# Patient Record
Sex: MALE | Race: WHITE | Employment: OTHER | URBAN - METROPOLITAN AREA
[De-identification: names, ages, dates, MRNs, and addresses within clinical notes are randomized per-mention and may not be internally consistent; named-entity substitution may affect disease eponyms.]

---

## 2018-02-21 ENCOUNTER — HOSPITAL ENCOUNTER (OUTPATIENT)
Dept: ULTRASOUND IMAGING | Age: 68
Discharge: HOME | End: 2018-02-21

## 2018-02-21 DIAGNOSIS — Z87.891 PERSONAL HISTORY OF NICOTINE DEPENDENCE: ICD-10-CM

## 2018-07-19 ENCOUNTER — HOSPITAL ENCOUNTER (OUTPATIENT)
Dept: NON INVASIVE DIAGNOSTICS | Age: 68
Discharge: HOME | End: 2018-07-19

## 2018-07-19 DIAGNOSIS — M79.674 PAIN OF TOE OF RIGHT FOOT: ICD-10-CM

## 2019-04-27 ENCOUNTER — APPOINTMENT (OUTPATIENT)
Dept: GENERAL RADIOLOGY | Age: 69
End: 2019-04-27
Payer: MEDICARE

## 2019-04-27 ENCOUNTER — HOSPITAL ENCOUNTER (EMERGENCY)
Age: 69
Discharge: HOME | End: 2019-04-28
Payer: MEDICARE

## 2019-04-27 DIAGNOSIS — S52.222A CLOSED DISPLACED TRANSVERSE FRACTURE OF SHAFT OF LEFT ULNA, INITIAL ENCOUNTER: ICD-10-CM

## 2019-04-27 DIAGNOSIS — T07.XXXA ABRASION, MULTIPLE SITES: ICD-10-CM

## 2019-04-27 DIAGNOSIS — S81.012A LACERATION OF LEFT KNEE, INITIAL ENCOUNTER: ICD-10-CM
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
## 2019-04-27 PROCEDURE — 71101 X-RAY EXAM UNILAT RIBS/CHEST: CPT

## 2019-04-27 PROCEDURE — 6370000000 HC RX 637 (ALT 250 FOR IP): Performed by: EMERGENCY MEDICINE

## 2019-04-27 PROCEDURE — 4500000024 HC ED LEVEL 4 PROCEDURE

## 2019-04-27 PROCEDURE — 99284 EMERGENCY DEPT VISIT MOD MDM: CPT

## 2019-04-27 PROCEDURE — 73090 X-RAY EXAM OF FOREARM: CPT

## 2019-04-27 PROCEDURE — 73562 X-RAY EXAM OF KNEE 3: CPT

## 2019-04-27 RX ORDER — OXYCODONE HYDROCHLORIDE AND ACETAMINOPHEN 5; 325 MG/1; MG/1
1 TABLET ORAL ONCE
Status: COMPLETED | OUTPATIENT
Start: 2019-04-27 | End: 2019-04-27

## 2019-04-27 RX ORDER — HYDROCODONE BITARTRATE AND ACETAMINOPHEN 5; 325 MG/1; MG/1
1 TABLET ORAL EVERY 8 HOURS PRN
Qty: 9 TABLET | Refills: 0 | Status: SHIPPED | OUTPATIENT
Start: 2019-04-27 | End: 2019-04-30

## 2019-04-27 RX ORDER — NAPROXEN 500 MG/1
500 TABLET ORAL 2 TIMES DAILY
Qty: 20 TABLET | Refills: 0 | Status: SHIPPED | OUTPATIENT
Start: 2019-04-27 | End: 2019-05-06 | Stop reason: ALTCHOICE

## 2019-04-27 RX ADMIN — OXYCODONE HYDROCHLORIDE AND ACETAMINOPHEN 1 TABLET: 5; 325 TABLET ORAL at 21:59

## 2019-04-27 ASSESSMENT — PAIN SCALES - GENERAL
PAINLEVEL_OUTOF10: 9
PAINLEVEL_OUTOF10: 6
PAINLEVEL_OUTOF10: 9

## 2019-04-27 ASSESSMENT — ENCOUNTER SYMPTOMS
SHORTNESS OF BREATH: 0
VOMITING: 0
SORE THROAT: 0
ABDOMINAL PAIN: 0
NAUSEA: 0
BACK PAIN: 0

## 2019-04-27 ASSESSMENT — PAIN DESCRIPTION - ORIENTATION
ORIENTATION: LEFT
ORIENTATION: LEFT

## 2019-04-27 ASSESSMENT — PAIN DESCRIPTION - PROGRESSION: CLINICAL_PROGRESSION: GRADUALLY IMPROVING

## 2019-04-27 ASSESSMENT — PAIN DESCRIPTION - DESCRIPTORS
DESCRIPTORS: THROBBING
DESCRIPTORS: THROBBING

## 2019-04-27 ASSESSMENT — PAIN DESCRIPTION - PAIN TYPE
TYPE: ACUTE PAIN
TYPE: ACUTE PAIN

## 2019-04-27 ASSESSMENT — PAIN DESCRIPTION - LOCATION
LOCATION: ARM
LOCATION: ARM

## 2019-04-27 ASSESSMENT — PAIN DESCRIPTION - ONSET: ONSET: ON-GOING

## 2019-04-27 ASSESSMENT — PAIN - FUNCTIONAL ASSESSMENT: PAIN_FUNCTIONAL_ASSESSMENT: PREVENTS OR INTERFERES WITH MANY ACTIVE NOT PASSIVE ACTIVITIES

## 2019-04-27 ASSESSMENT — PAIN DESCRIPTION - FREQUENCY
FREQUENCY: CONTINUOUS
FREQUENCY: CONTINUOUS

## 2019-04-28 VITALS
DIASTOLIC BLOOD PRESSURE: 85 MMHG | HEART RATE: 87 BPM | BODY MASS INDEX: 31.93 KG/M2 | HEIGHT: 69 IN | OXYGEN SATURATION: 98 % | SYSTOLIC BLOOD PRESSURE: 137 MMHG | TEMPERATURE: 98.1 F | RESPIRATION RATE: 18 BRPM | WEIGHT: 215.61 LBS

## 2019-04-28 ASSESSMENT — PAIN DESCRIPTION - ORIENTATION: ORIENTATION: LEFT

## 2019-04-28 ASSESSMENT — PAIN - FUNCTIONAL ASSESSMENT
PAIN_FUNCTIONAL_ASSESSMENT: PREVENTS OR INTERFERES WITH MANY ACTIVE NOT PASSIVE ACTIVITIES
PAIN_FUNCTIONAL_ASSESSMENT: 0-10

## 2019-04-28 ASSESSMENT — PAIN SCALES - GENERAL: PAINLEVEL_OUTOF10: 6

## 2019-04-28 ASSESSMENT — PAIN DESCRIPTION - PROGRESSION: CLINICAL_PROGRESSION: GRADUALLY IMPROVING

## 2019-04-28 ASSESSMENT — PAIN DESCRIPTION - PAIN TYPE: TYPE: ACUTE PAIN

## 2019-04-28 ASSESSMENT — PAIN DESCRIPTION - LOCATION: LOCATION: ARM

## 2019-04-28 ASSESSMENT — PAIN DESCRIPTION - DESCRIPTORS: DESCRIPTORS: THROBBING

## 2019-04-28 ASSESSMENT — PAIN DESCRIPTION - FREQUENCY: FREQUENCY: CONTINUOUS

## 2019-04-28 ASSESSMENT — PAIN DESCRIPTION - ONSET: ONSET: ON-GOING
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
## 2019-05-02 ENCOUNTER — OFFICE VISIT (OUTPATIENT)
Dept: ORTHOPEDIC SURGERY | Age: 69
End: 2019-05-02
Payer: MEDICARE

## 2019-05-02 ENCOUNTER — TELEPHONE (OUTPATIENT)
Dept: ORTHOPEDIC SURGERY | Age: 69
End: 2019-05-02

## 2019-05-02 VITALS
DIASTOLIC BLOOD PRESSURE: 80 MMHG | BODY MASS INDEX: 31.84 KG/M2 | HEART RATE: 71 BPM | WEIGHT: 215 LBS | HEIGHT: 69 IN | SYSTOLIC BLOOD PRESSURE: 117 MMHG

## 2019-05-02 DIAGNOSIS — S52.222A CLOSED DISPLACED TRANSVERSE FRACTURE OF SHAFT OF LEFT ULNA, INITIAL ENCOUNTER: Primary | ICD-10-CM

## 2019-05-02 PROCEDURE — G8427 DOCREV CUR MEDS BY ELIG CLIN: HCPCS | Performed by: ORTHOPAEDIC SURGERY

## 2019-05-02 PROCEDURE — 1036F TOBACCO NON-USER: CPT | Performed by: ORTHOPAEDIC SURGERY

## 2019-05-02 PROCEDURE — G8417 CALC BMI ABV UP PARAM F/U: HCPCS | Performed by: ORTHOPAEDIC SURGERY

## 2019-05-02 PROCEDURE — 3017F COLORECTAL CA SCREEN DOC REV: CPT | Performed by: ORTHOPAEDIC SURGERY

## 2019-05-02 PROCEDURE — 1123F ACP DISCUSS/DSCN MKR DOCD: CPT | Performed by: ORTHOPAEDIC SURGERY

## 2019-05-02 PROCEDURE — 4040F PNEUMOC VAC/ADMIN/RCVD: CPT | Performed by: ORTHOPAEDIC SURGERY

## 2019-05-02 PROCEDURE — 99203 OFFICE O/P NEW LOW 30 MIN: CPT | Performed by: ORTHOPAEDIC SURGERY

## 2019-05-02 NOTE — LETTER
CONSENT TO OPERATION  AND/OR OTHER PROCEDURE(S)          PATIENT : Cassy Mojica   YOB: 1950    DATE : 5/2/19          1. I request and consent that Dr. Lovely Maloney. Emilio Ann,  and/or his associates or assistants perform an operation and/or procedures on the above patient at  [place] [address], to treat the condition(s) which appear indicated by the diagnostic studies already performed. I have been told that in general terms the nature, purpose and reasonable expectations of the operation and/or procedure(s) are:     ORIF Fracture left ulna      2. It has been explained to me by the informing physician that during the course of the operation and/or procedure(s) unforeseen conditions may be revealed that necessitate an extension of the original operation and/or procedure(s) or different operation and/or procedures than those set forth in Paragraph 1. I therefore authorize and request that my physician and/or his associates or assistants perform such operations and/or procedures as are necessary and desirable in the exercise of professional judgment. The authority granted under this Paragraph 2 shall extend to all conditions that require treatment and are known to my physician at the time the operation is commenced. 3. I have been made aware by the informing physician of certain risks and consequences that are associated with the operation and/or procedure(s) described in Paragraph 1, the reasonable alternative methods or treatment, the possible consequences, the possibility that the operation and/or procedure(s) may be unsuccessful and the possibility of complications. I understand the reasonably known risks to be:      ? Bleeding  ? Infection  ? Poor Healing  ? Possible Damage to Nerve, Vessel, Tendon/Muscle or Bone  ? Need for further Treatment/Surgery  ? Stiffness  ? Pain  ? Residual or Recurrent Symptoms  ? Anesthetic and/or Medical Risks  ? 4. I have also been informed by the informing physician that there are other risks from both known and unknown causes that are attendant to the performance of any surgical procedure. I am aware that the practice of medicine and surgery is not an exact science, and that no guarantees have been made to me concerning the results of the operation and/or procedure(s). 5. I   CONSENT / REFUSE CONSENT  (strike the phrase that does not apply) to the taking of photographs before, during and/or after the operation or procedure for scientific/educational purposes. 6. I consent to the administration of anesthesia and to the use of such anesthetics as may be deemed advisable by the anesthesiologist who has been engaged by me or my physician. 7. I certify that I have read and understand the above consent to operation and/or other procedure(s); that the explanations therein referred to were made to me by the informing physician in advance of my signing this consent; that all blanks or statements requiring insertion or completion were filled in and inapplicable paragraphs, if any, were stricken before I signed; and that all questions asked by me about the operation and/or procedure(s) which I have consented to have been fully answered in a satisfactory manner.                               _______________________           5/2/19                            Witness     Signature Of Patient         Date        Lazaro Lindsey                                                 Informing Physician                                           Signature of Informing Physician                              If patient is unable to sign or is a minor, complete one of the following:    (A)  Patient is a minor  years of age. (B)  Patient is unable to sign because: The undersigned represents that he or she is duly authorized to execute this consent for and on behalf of the above named patient.               Witness o  Parent  o  Guardian   o  Spouse       o  Other (specify)                                   Patient Name: Marta Donnelly  Patient YOB: 1950  Dr. Ha Burgess' Return To Work Policy  Regarding your ability to return to work after surgery or injury, Dr. Ha Burgess will not state that any patient is off of work or cannot work at all. He will place you on restrictions after your surgical procedure or injury. This means that you will be allowed to return to work the day after your office visit or surgery with restrictions. Depending on the details of your particular situation, Dr. Ha Burgess may state that you will have either light use or no use of your hand for a specific number of weeks. It is your obligation to communicate with your employer regarding your restrictions. It is your employer's decision as to whether they will accommodate your restrictions (i.e. allow you to come to work in your restricted capacity) or to not allow you to return to work under your restrictions. Dr. Ha Burgess does not participate in making this decision and cannot influence your employer regarding their decision. If you do not communicate your restrictions to your employer, or if you do not present to work as you are scheduled to, Dr. Ha Burgess will not provide an 'excuse' to explain your absence. A doctors note, or official forms (C, FMLA, etc.) will be filled out, upon request, to indicate your date of surgery and your restrictions as stated above. Dr. Ha Burgess' Narcotic Policy  Patients will only be prescribed narcotics after surgical procedures or significant injury. Not all procedures cause pain great enough to require Narcotics and thus, not all patients will receive prescriptions after surgical procedures or injuries. Narcotics are never prescribed for chronic conditions. Narcotics are never prescribed for use longer than one week at a time.  Refills are only granted in unusual circumstances and only at Dr. Sravan Paiz discretion. Patients who are receiving narcotic medication from another physician or who are under pain management contracts will not be given a prescription for narcotics for any reason. I have read the above policies and understand that by agreeing to proceed with treatment by Dr. Charline Casey and his team, that I am agreeing to abide by these policies.     Patient Name:  Jamshid Armijo    Patient Signature:  _____________________________    Андрейista Outhouse Date:   5/2/19
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
## 2019-05-02 NOTE — LETTER
[facility]  Surgery Scheduling Form:  DEMOGRAPHICS:                                                                                                               Patient Name:  Roney Fung  Patient :  1950   Patient SS#:  [id number]    Patient Phone:  [phone] (home)      Patient Address:  [address] [place] [address]    PCP:  Richie Lowery MD  Insurance:  . Payor/Plan Subscr  Sex Relation Sub. Ins. ID Effective Group Num   1. [place] 1950 Male  [id number]                                   [address] [address]   2. [employer] [place] Tabitha 1950 Male Self  19                                    [address] [facility], [address], [facility] [address]     DIAGNOSIS & PROCEDURE:                                                                                             Diagnosis:  Left ulna shaft fracture    S52.222A  Operation:  Open Reduction & Internal Fixation  left ulna shaft Fracture     [id number]  Location:  [facility]  Surgeon:  Lavinia Serrano    SCHEDULING INFORMATION:                                                                                         .  Surgeon's Scheduling Instruction:  within 7 days  Requested Date:    OR Time:  11:45 Patient Arrival Time:  10:15OR Time Required:  45  Minutes  Anesthesia:  General  Equipment:  Lauryn/Biomet small frag set, TPS  Mini C-Arm:  Yes   Standard C-Arm:  No  Status:  outpatient  PAT Required:  Yes  Comments:                      Maxwell Ramos.  Gricelda Krueger MD  19   BILLING INFORMATION:                                                                                                     Procedure:       CPT Code Modifier

## 2019-05-06 ENCOUNTER — ANESTHESIA EVENT (OUTPATIENT)
Dept: OPERATING ROOM | Age: 69
End: 2019-05-06
Payer: MEDICARE

## 2019-05-06 RX ORDER — BUPROPION HYDROCHLORIDE 150 MG/1
150 TABLET ORAL EVERY MORNING
COMMUNITY
Start: 2019-03-11

## 2019-05-06 RX ORDER — CYCLOBENZAPRINE HCL 10 MG
10 TABLET ORAL 3 TIMES DAILY PRN
Status: ON HOLD | COMMUNITY
Start: 2010-12-21 | End: 2021-02-09

## 2019-05-06 RX ORDER — OMEGA-3 FATTY ACIDS CAP DELAYED RELEASE 1000 MG 1000 MG
1 CAPSULE DELAYED RELEASE ORAL DAILY
COMMUNITY
End: 2021-02-03

## 2019-05-06 RX ORDER — RANITIDINE 150 MG/1
TABLET ORAL
COMMUNITY

## 2019-05-06 RX ORDER — CHOLECALCIFEROL (VITAMIN D3) 125 MCG
CAPSULE ORAL
COMMUNITY

## 2019-05-06 RX ORDER — ROSUVASTATIN CALCIUM 10 MG/1
10 TABLET, COATED ORAL DAILY
COMMUNITY
Start: 2019-03-11 | End: 2019-05-21

## 2019-05-06 RX ORDER — LISINOPRIL 10 MG/1
TABLET ORAL DAILY
Status: ON HOLD | COMMUNITY
Start: 2019-03-11 | End: 2021-02-09

## 2019-05-06 RX ORDER — ACETAMINOPHEN 325 MG/1
650 TABLET ORAL DAILY
COMMUNITY

## 2019-05-06 RX ORDER — MULTIVIT-MIN/IRON/FOLIC ACID/K 18-600-40
500 CAPSULE ORAL DAILY
COMMUNITY

## 2019-05-06 NOTE — PROGRESS NOTES
C-Difficile admission screening and protocol:     * Admitted with diarrhea? NO   *Prior history of C-Diff. In last 3 months? NO   *Antibiotic use in the past 6-8 weeks? NO     *Prior hospitalization or nursing home in the last month?    NO

## 2019-05-06 NOTE — PROGRESS NOTES
[address] [facility] time____1015________        Surgery time___1145_________    Take the following medications with a sip of water: LISINOPRIL,BUPROPION    Do not eat or drink anything after 12:00 midnight prior to your surgery. This includes water chewing gum, mints and ice chips. You may brush your teeth and gargle the morning of your surgery, but do not swallow the water     Please see your family doctor/pediatrician for a history and physical and/or concerning medications. Bring any test results/reports from your physicians office. If you are under the care of a heart doctor or specialist doctor, please be aware that you may be asked to them for clearance    You may be asked to stop blood thinners such as Coumadin, Plavix, Fragmin, Lovenox, etc., or any anti-inflammatories such as:  Aspirin, Ibuprofen, Advil, Naproxen prior to your surgery. We also ask that you stop any OTC medications such as fish oil, vitamin E, glucosamine, garlic, Multivitamins, COQ 10, etc.MAY TAKE TYLENOL    We ask that you do not smoke 24 hours prior to surgery  We ask that you do not  drink any alcoholic beverages 24 hours prior to surgery     You must make arrangements for a responsible adult to take you home after your surgery. For your safety you will not be allowed to leave alone or drive yourself home. Your surgery will be cancelled if you do not have a ride home. Also for your safety, it is strongly suggested that someone stay with you the first 24 hours after your surgery. A parent or legal guardian must accompany a child scheduled for surgery and plan to stay at the hospital until the child is discharged. Please do not bring other children with you. For your comfort, please wear simple loose fitting clothing to the hospital.  Please do not bring valuables.     Do not wear any make-up or nail polish on your fingers or toes      For your safety, please do not wear

## 2019-05-07 ENCOUNTER — HOSPITAL ENCOUNTER (OUTPATIENT)
Age: 69
Setting detail: OUTPATIENT SURGERY
Discharge: HOME | End: 2019-05-07
Attending: ORTHOPAEDIC SURGERY | Admitting: ORTHOPAEDIC SURGERY
Payer: MEDICARE

## 2019-05-07 ENCOUNTER — APPOINTMENT (OUTPATIENT)
Dept: GENERAL RADIOLOGY | Age: 69
End: 2019-05-07
Attending: ORTHOPAEDIC SURGERY
Payer: MEDICARE

## 2019-05-07 ENCOUNTER — ANESTHESIA (OUTPATIENT)
Dept: OPERATING ROOM | Age: 69
End: 2019-05-07
Payer: MEDICARE

## 2019-05-07 VITALS
DIASTOLIC BLOOD PRESSURE: 90 MMHG | HEIGHT: 69 IN | WEIGHT: 210 LBS | BODY MASS INDEX: 31.1 KG/M2 | HEART RATE: 74 BPM | OXYGEN SATURATION: 94 % | TEMPERATURE: 97 F | SYSTOLIC BLOOD PRESSURE: 148 MMHG | RESPIRATION RATE: 18 BRPM

## 2019-05-07 VITALS
RESPIRATION RATE: 3 BRPM | OXYGEN SATURATION: 98 % | SYSTOLIC BLOOD PRESSURE: 119 MMHG | DIASTOLIC BLOOD PRESSURE: 61 MMHG

## 2019-05-07 DIAGNOSIS — S52.222A CLOSED DISPLACED TRANSVERSE FRACTURE OF SHAFT OF LEFT ULNA, INITIAL ENCOUNTER: Primary | ICD-10-CM

## 2019-05-07 LAB
EKG ATRIAL RATE: 74 BPM
EKG DIAGNOSIS: NORMAL
EKG P AXIS: 58 DEGREES
EKG P-R INTERVAL: 164 MS
EKG Q-T INTERVAL: 378 MS
EKG QRS DURATION: 104 MS
EKG QTC CALCULATION (BAZETT): 419 MS
EKG R AXIS: 84 DEGREES
EKG T AXIS: 31 DEGREES
EKG VENTRICULAR RATE: 74 BPM

## 2019-05-07 PROCEDURE — C1713 ANCHOR/SCREW BN/BN,TIS/BN: HCPCS | Performed by: ORTHOPAEDIC SURGERY

## 2019-05-07 PROCEDURE — 2720000010 HC SURG SUPPLY STERILE: Performed by: ORTHOPAEDIC SURGERY

## 2019-05-07 PROCEDURE — 3700000000 HC ANESTHESIA ATTENDED CARE: Performed by: ORTHOPAEDIC SURGERY

## 2019-05-07 PROCEDURE — 3209999900 FLUORO FOR SURGICAL PROCEDURES

## 2019-05-07 PROCEDURE — 2500000003 HC RX 250 WO HCPCS: Performed by: NURSE ANESTHETIST, CERTIFIED REGISTERED

## 2019-05-07 PROCEDURE — 7100000000 HC PACU RECOVERY - FIRST 15 MIN: Performed by: ORTHOPAEDIC SURGERY

## 2019-05-07 PROCEDURE — 3700000001 HC ADD 15 MINUTES (ANESTHESIA): Performed by: ORTHOPAEDIC SURGERY

## 2019-05-07 PROCEDURE — 2709999900 HC NON-CHARGEABLE SUPPLY: Performed by: ORTHOPAEDIC SURGERY

## 2019-05-07 PROCEDURE — 7100000011 HC PHASE II RECOVERY - ADDTL 15 MIN: Performed by: ORTHOPAEDIC SURGERY

## 2019-05-07 PROCEDURE — 7100000010 HC PHASE II RECOVERY - FIRST 15 MIN: Performed by: ORTHOPAEDIC SURGERY

## 2019-05-07 PROCEDURE — 93005 ELECTROCARDIOGRAM TRACING: CPT | Performed by: ANESTHESIOLOGY

## 2019-05-07 PROCEDURE — 3600000005 HC SURGERY LEVEL 5 BASE: Performed by: ORTHOPAEDIC SURGERY

## 2019-05-07 PROCEDURE — 2500000003 HC RX 250 WO HCPCS: Performed by: ORTHOPAEDIC SURGERY

## 2019-05-07 PROCEDURE — 2580000003 HC RX 258: Performed by: ANESTHESIOLOGY

## 2019-05-07 PROCEDURE — 6360000002 HC RX W HCPCS: Performed by: NURSE ANESTHETIST, CERTIFIED REGISTERED

## 2019-05-07 PROCEDURE — 3600000015 HC SURGERY LEVEL 5 ADDTL 15MIN: Performed by: ORTHOPAEDIC SURGERY

## 2019-05-07 PROCEDURE — 6360000002 HC RX W HCPCS: Performed by: ANESTHESIOLOGY

## 2019-05-07 PROCEDURE — 7100000001 HC PACU RECOVERY - ADDTL 15 MIN: Performed by: ORTHOPAEDIC SURGERY

## 2019-05-07 PROCEDURE — 93010 ELECTROCARDIOGRAM REPORT: CPT | Performed by: INTERNAL MEDICINE

## 2019-05-07 DEVICE — SCREW BNE L18MM DIA3.5MM PERIARTC PROX HUM LOK FULL THRD: Type: IMPLANTABLE DEVICE | Site: ARM | Status: FUNCTIONAL

## 2019-05-07 DEVICE — SCREW BNE L20MM DIA3.5MM SM HEX HD DIA2.5MM CORT S STL ST: Type: IMPLANTABLE DEVICE | Site: ARM | Status: FUNCTIONAL

## 2019-05-07 DEVICE — IMPLANTABLE DEVICE: Type: IMPLANTABLE DEVICE | Site: ARM | Status: FUNCTIONAL

## 2019-05-07 DEVICE — SCREW BNE L16MM DIA3.5MM STD CORT S STL ST NONCANNULATED: Type: IMPLANTABLE DEVICE | Site: ARM | Status: FUNCTIONAL

## 2019-05-07 DEVICE — SCREW BNE L18MM DIA3.5MM LNG CORT S STL ST NONCANNULATED: Type: IMPLANTABLE DEVICE | Site: ARM | Status: FUNCTIONAL

## 2019-05-07 RX ORDER — MORPHINE SULFATE 2 MG/ML
2 INJECTION, SOLUTION INTRAMUSCULAR; INTRAVENOUS EVERY 5 MIN PRN
Status: DISCONTINUED | OUTPATIENT
Start: 2019-05-07 | End: 2019-05-07 | Stop reason: HOSPADM

## 2019-05-07 RX ORDER — OXYCODONE HYDROCHLORIDE 5 MG/1
10 TABLET ORAL PRN
Status: DISCONTINUED | OUTPATIENT
Start: 2019-05-07 | End: 2019-05-07 | Stop reason: HOSPADM

## 2019-05-07 RX ORDER — PROPOFOL 10 MG/ML
INJECTION, EMULSION INTRAVENOUS PRN
Status: DISCONTINUED | OUTPATIENT
Start: 2019-05-07 | End: 2019-05-07 | Stop reason: SDUPTHER

## 2019-05-07 RX ORDER — FENTANYL CITRATE 50 UG/ML
25 INJECTION, SOLUTION INTRAMUSCULAR; INTRAVENOUS EVERY 5 MIN PRN
Status: DISCONTINUED | OUTPATIENT
Start: 2019-05-07 | End: 2019-05-07 | Stop reason: HOSPADM

## 2019-05-07 RX ORDER — GLYCOPYRROLATE 0.2 MG/ML
INJECTION INTRAMUSCULAR; INTRAVENOUS PRN
Status: DISCONTINUED | OUTPATIENT
Start: 2019-05-07 | End: 2019-05-07 | Stop reason: SDUPTHER

## 2019-05-07 RX ORDER — LIDOCAINE HYDROCHLORIDE 20 MG/ML
INJECTION, SOLUTION EPIDURAL; INFILTRATION; INTRACAUDAL; PERINEURAL PRN
Status: DISCONTINUED | OUTPATIENT
Start: 2019-05-07 | End: 2019-05-07 | Stop reason: SDUPTHER

## 2019-05-07 RX ORDER — OXYCODONE HYDROCHLORIDE 5 MG/1
5 TABLET ORAL PRN
Status: DISCONTINUED | OUTPATIENT
Start: 2019-05-07 | End: 2019-05-07 | Stop reason: HOSPADM

## 2019-05-07 RX ORDER — MEPERIDINE HYDROCHLORIDE 25 MG/ML
12.5 INJECTION INTRAMUSCULAR; INTRAVENOUS; SUBCUTANEOUS EVERY 5 MIN PRN
Status: DISCONTINUED | OUTPATIENT
Start: 2019-05-07 | End: 2019-05-07 | Stop reason: HOSPADM

## 2019-05-07 RX ORDER — FENTANYL CITRATE 50 UG/ML
INJECTION, SOLUTION INTRAMUSCULAR; INTRAVENOUS PRN
Status: DISCONTINUED | OUTPATIENT
Start: 2019-05-07 | End: 2019-05-07 | Stop reason: SDUPTHER

## 2019-05-07 RX ORDER — SODIUM CHLORIDE 9 MG/ML
INJECTION, SOLUTION INTRAVENOUS CONTINUOUS
Status: DISCONTINUED | OUTPATIENT
Start: 2019-05-07 | End: 2019-05-07 | Stop reason: HOSPADM

## 2019-05-07 RX ORDER — HYDROCODONE BITARTRATE AND ACETAMINOPHEN 5; 325 MG/1; MG/1
1 TABLET ORAL EVERY 6 HOURS PRN
Qty: 20 TABLET | Refills: 0 | Status: SHIPPED | OUTPATIENT
Start: 2019-05-07 | End: 2019-05-14

## 2019-05-07 RX ORDER — ONDANSETRON 2 MG/ML
4 INJECTION INTRAMUSCULAR; INTRAVENOUS
Status: DISCONTINUED | OUTPATIENT
Start: 2019-05-07 | End: 2019-05-07 | Stop reason: HOSPADM

## 2019-05-07 RX ORDER — BUPIVACAINE HYDROCHLORIDE 5 MG/ML
INJECTION, SOLUTION EPIDURAL; INTRACAUDAL
Status: COMPLETED | OUTPATIENT
Start: 2019-05-07 | End: 2019-05-07

## 2019-05-07 RX ORDER — SODIUM CHLORIDE 0.9 % (FLUSH) 0.9 %
10 SYRINGE (ML) INJECTION PRN
Status: DISCONTINUED | OUTPATIENT
Start: 2019-05-07 | End: 2019-05-07 | Stop reason: HOSPADM

## 2019-05-07 RX ORDER — DEXAMETHASONE SODIUM PHOSPHATE 4 MG/ML
INJECTION, SOLUTION INTRA-ARTICULAR; INTRALESIONAL; INTRAMUSCULAR; INTRAVENOUS; SOFT TISSUE PRN
Status: DISCONTINUED | OUTPATIENT
Start: 2019-05-07 | End: 2019-05-07 | Stop reason: SDUPTHER

## 2019-05-07 RX ORDER — MORPHINE SULFATE 2 MG/ML
1 INJECTION, SOLUTION INTRAMUSCULAR; INTRAVENOUS EVERY 5 MIN PRN
Status: DISCONTINUED | OUTPATIENT
Start: 2019-05-07 | End: 2019-05-07 | Stop reason: HOSPADM

## 2019-05-07 RX ORDER — SODIUM CHLORIDE 0.9 % (FLUSH) 0.9 %
10 SYRINGE (ML) INJECTION EVERY 12 HOURS SCHEDULED
Status: DISCONTINUED | OUTPATIENT
Start: 2019-05-07 | End: 2019-05-07 | Stop reason: HOSPADM

## 2019-05-07 RX ORDER — FENTANYL CITRATE 50 UG/ML
50 INJECTION, SOLUTION INTRAMUSCULAR; INTRAVENOUS EVERY 5 MIN PRN
Status: DISCONTINUED | OUTPATIENT
Start: 2019-05-07 | End: 2019-05-07 | Stop reason: HOSPADM

## 2019-05-07 RX ADMIN — LIDOCAINE HYDROCHLORIDE 5 ML: 20 INJECTION, SOLUTION EPIDURAL; INFILTRATION; INTRACAUDAL; PERINEURAL at 11:53

## 2019-05-07 RX ADMIN — DEXAMETHASONE SODIUM PHOSPHATE 6 MG: 4 INJECTION, SOLUTION INTRAMUSCULAR; INTRAVENOUS at 11:59

## 2019-05-07 RX ADMIN — GLYCOPYRROLATE 0.1 MG: 0.2 INJECTION, SOLUTION INTRAMUSCULAR; INTRAVENOUS at 11:52

## 2019-05-07 RX ADMIN — HYDROMORPHONE HYDROCHLORIDE 1 MG: 1 INJECTION, SOLUTION INTRAMUSCULAR; INTRAVENOUS; SUBCUTANEOUS at 12:36

## 2019-05-07 RX ADMIN — FENTANYL CITRATE 25 MCG: 50 INJECTION, SOLUTION INTRAMUSCULAR; INTRAVENOUS at 12:55

## 2019-05-07 RX ADMIN — FENTANYL CITRATE 50 MCG: 50 INJECTION INTRAMUSCULAR; INTRAVENOUS at 11:53

## 2019-05-07 RX ADMIN — FENTANYL CITRATE 50 MCG: 50 INJECTION INTRAMUSCULAR; INTRAVENOUS at 11:52

## 2019-05-07 RX ADMIN — FENTANYL CITRATE 25 MCG: 50 INJECTION, SOLUTION INTRAMUSCULAR; INTRAVENOUS at 13:01

## 2019-05-07 RX ADMIN — SODIUM CHLORIDE: 9 INJECTION, SOLUTION INTRAVENOUS at 12:19

## 2019-05-07 RX ADMIN — SODIUM CHLORIDE: 9 INJECTION, SOLUTION INTRAVENOUS at 10:47

## 2019-05-07 RX ADMIN — PROPOFOL 200 MG: 10 INJECTION, EMULSION INTRAVENOUS at 11:53

## 2019-05-07 ASSESSMENT — PAIN DESCRIPTION - PAIN TYPE
TYPE: SURGICAL PAIN

## 2019-05-07 ASSESSMENT — PULMONARY FUNCTION TESTS
PIF_VALUE: 14
PIF_VALUE: 13
PIF_VALUE: 11
PIF_VALUE: 13
PIF_VALUE: 13
PIF_VALUE: 14
PIF_VALUE: 13
PIF_VALUE: 14
PIF_VALUE: 2
PIF_VALUE: 0
PIF_VALUE: 11
PIF_VALUE: 14
PIF_VALUE: 12
PIF_VALUE: 15
PIF_VALUE: 13
PIF_VALUE: 13
PIF_VALUE: 14
PIF_VALUE: 13
PIF_VALUE: 14
PIF_VALUE: 14
PIF_VALUE: 13
PIF_VALUE: 14
PIF_VALUE: 13
PIF_VALUE: 14
PIF_VALUE: 0
PIF_VALUE: 1
PIF_VALUE: 13
PIF_VALUE: 12
PIF_VALUE: 14
PIF_VALUE: 13
PIF_VALUE: 14
PIF_VALUE: 13
PIF_VALUE: 13
PIF_VALUE: 14
PIF_VALUE: 13
PIF_VALUE: 14
PIF_VALUE: 1
PIF_VALUE: 14
PIF_VALUE: 13
PIF_VALUE: 14
PIF_VALUE: 12
PIF_VALUE: 12

## 2019-05-07 ASSESSMENT — PAIN DESCRIPTION - PROGRESSION
CLINICAL_PROGRESSION: NOT CHANGED
CLINICAL_PROGRESSION: GRADUALLY IMPROVING
CLINICAL_PROGRESSION: NOT CHANGED

## 2019-05-07 ASSESSMENT — PAIN DESCRIPTION - LOCATION
LOCATION: ARM
LOCATION: WRIST
LOCATION: WRIST

## 2019-05-07 ASSESSMENT — PAIN DESCRIPTION - DESCRIPTORS
DESCRIPTORS: ACHING;DISCOMFORT;SORE
DESCRIPTORS: ACHING;DULL
DESCRIPTORS: DULL;ACHING
DESCRIPTORS: DULL;ACHING
DESCRIPTORS: ACHING;DULL
DESCRIPTORS: ACHING;DULL

## 2019-05-07 ASSESSMENT — PAIN DESCRIPTION - FREQUENCY
FREQUENCY: CONTINUOUS

## 2019-05-07 ASSESSMENT — PAIN DESCRIPTION - ORIENTATION
ORIENTATION: LEFT

## 2019-05-07 ASSESSMENT — PAIN SCALES - GENERAL
PAINLEVEL_OUTOF10: 4
PAINLEVEL_OUTOF10: 3
PAINLEVEL_OUTOF10: 0
PAINLEVEL_OUTOF10: 3
PAINLEVEL_OUTOF10: 3
PAINLEVEL_OUTOF10: 4
PAINLEVEL_OUTOF10: 3

## 2019-05-07 ASSESSMENT — PAIN DESCRIPTION - ONSET
ONSET: ON-GOING

## 2019-05-07 ASSESSMENT — PAIN - FUNCTIONAL ASSESSMENT
PAIN_FUNCTIONAL_ASSESSMENT: PREVENTS OR INTERFERES WITH ALL ACTIVE AND SOME PASSIVE ACTIVITIES
PAIN_FUNCTIONAL_ASSESSMENT: 0-10
PAIN_FUNCTIONAL_ASSESSMENT: PREVENTS OR INTERFERES SOME ACTIVE ACTIVITIES AND ADLS

## 2019-05-07 NOTE — PROGRESS NOTES
To PACU by cart from OR. On monitors. VSS. Oral airway in place. resps easy and regular. Left arm elevated on a pillow.

## 2019-05-07 NOTE — ANESTHESIA POSTPROCEDURE EVALUATION
Department of Anesthesiology  Postprocedure Note    Patient: Zach Mendoza  MRN: [id number]  YOB: 1950  Date of evaluation: 5/7/2019  Time:  2:06 PM     Procedure Summary     Date:  05/07/19 Room / Location:  [facility] OR 03 / [facility] OR    Anesthesia Start:  1152 Anesthesia Stop:  1244    Procedure:  OPEN REDUCTION AND INTERNAL FIXATION LEFT ULNA SHAFT FRACTURE WITH MINI C-ARM (Left ) Diagnosis:  (LEFT ULNA SHAFT FRACTURE)    Surgeon:  Raissa Barboza MD Responsible Provider:  Remy Batista MD    Anesthesia Type:  general ASA Status:  2          Anesthesia Type: general    Jannette Phase I: Jannette Score: 10    Jannette Phase II: Jannette Score: 10    Last vitals: Reviewed and per EMR flowsheets.        Anesthesia Post Evaluation    Patient location during evaluation: PACU  Patient participation: complete - patient participated  Level of consciousness: awake and alert  Pain score: 0  Airway patency: patent  Nausea & Vomiting: no nausea and no vomiting  Complications: no  Cardiovascular status: blood pressure returned to baseline  Respiratory status: acceptable  Hydration status: euvolemic

## 2019-05-07 NOTE — PROGRESS NOTES
States pain level tolerable. Stable for transfer to ACU by cart. Able to open and close fingers to left hand.

## 2019-05-07 NOTE — PROGRESS NOTES
Report received from [facility]. Daughter present. Pt wants to fill prescription at retail pharmacy. Daughter took prescription to retail pharmacy.

## 2019-05-07 NOTE — OP NOTE
displace fracture of the ulna. The fracture was mobilized and manually reduced. A 6 - hole locking plate was selected and applied to the appropriate surface of the distal ulna. The distal 2 holes were drilled, measured and tapped to accommodate appropriate screws. The plate was then loaded under compression, assuring the ulna was of anatomic alignment and anatomic rotation. It was secured proximally with 2 bicortical screws under compression mode. The fracture site was seen to compress beautifully and maintain excellent anatomic alignment during the remaining fixation. The remaining 2 holes were filled with non-locking screws. Final fluoroscopic imaging was obtained, demonstrating excellent & anatomic alignment of the ulna at the fracture site. The wound was copiously irrigated with sterile saline for irrigation. The forearm fascia was reapproximated in a watertight fashion over the hardware, obtaining excellent soft tissue coverage. The pneumatic tourniquet was deflated after 24 minutes of elevation. Hemostasis was obtained with direct pressure and electrocautery. The fingers were immediately pink and well perfused. The skin and subcutaneous tissues were closed in layers with interrupted absorbable sutures, the wound dressed with Adaptic, dry sterile dressing, and a bulky volar wrist splint was applied. Mr. Rosita Valenzuela was awakened from anesthesia, having tolerated the procedure without apparent complication. He was returned to the recovery room in stable condition. At the conclusion of the procedure, needle, instrument, and sponge counts   were correct. Jillian Muller MD   5/7/2019 , 11:49 AM

## 2019-05-07 NOTE — PROGRESS NOTES
No change to pain 3/10 in left wrist/ forearm. Discharge instructions given to pt and daughter. Both express an understanding of instructions.

## 2019-05-07 NOTE — ANESTHESIA PRE PROCEDURE
Department of Anesthesiology  Preprocedure Note       Name:  Colby Gonzáles   Age:  77 y.o.  :  1950                                          MRN:  [id number]         Date:  2019      Surgeon: Dharmesh Smith):  Fernanda Locke MD    Procedure: OPEN REDUCTION AND INTERNAL FIXATION LEFT ULNA SHAFT FRACTURE WITH MINI C-ARM (Left )    Medications prior to admission:   Prior to Admission medications    Medication Sig Start Date End Date Taking?  Authorizing Provider   B Complex Vitamins (B COMPLEX 1 PO) B Complex   1 daily 16  Yes Historical Provider, MD   Omega-3 Fatty Acids (FISH OIL) 1000 MG CPDR Take 1 capsule by mouth daily    Yes Historical Provider, MD   buPROPion (WELLBUTRIN XL) 150 MG extended release tablet Take 150 mg by mouth every morning  3/11/19  Yes Historical Provider, MD   cyclobenzaprine (FLEXERIL) 10 MG tablet Take 10 mg by mouth 3 times daily as needed  12/21/10  Yes Historical Provider, MD   lisinopril (PRINIVIL;ZESTRIL) 10 MG tablet Take by mouth daily  3/11/19  Yes Historical Provider, MD   melatonin 5 MG TABS tablet melatonin 5 mg tablet   Take 1 tablet every day by oral route at BEDTIME   Yes Historical Provider, MD   acetaminophen (TYLENOL) 325 MG tablet Take 650 mg by mouth daily   Yes Historical Provider, MD   Ascorbic Acid (VITAMIN C) 500 MG CAPS Take 500 mg by mouth daily     Historical Provider, MD   Cholecalciferol (VITAMIN D-3 PO) Vitamin D3 DAILY    Historical Provider, MD   rosuvastatin (CRESTOR) 10 MG tablet Take 10 mg by mouth daily  3/11/19   Historical Provider, MD   ranitidine (ZANTAC) 150 MG tablet ranitidine 150 mg tablet   TAKE ONE TABLET BY MOUTH DAILY NIGHTLY    Historical Provider, MD       Current medications:    Current Facility-Administered Medications   Medication Dose Route Frequency Provider Last Rate Last Dose    0.9 % sodium chloride infusion   Intravenous Continuous Sienna Pinedo MD        sodium chloride flush 0.9 % injection 10 mL  10 mL Intravenous 2 times per day Darío Jessica MD        sodium chloride flush 0.9 % injection 10 mL  10 mL Intravenous PRN Darío Jessica MD        famotidine (PEPCID) injection 20 mg  20 mg Intravenous Once Darío Jessica MD        ceFAZolin (ANCEF) 2 g in dextrose 5 % 100 mL IVPB  2 g Intravenous Once Jamel Soto MD           Allergies:     Allergies   Allergen Reactions    Hydrocodone Nausea Only     SWEATING,DIZZY    Vicodin [Hydrocodone-Acetaminophen] Nausea Only     SWEATING,DIZZY       Problem List:    Patient Active Problem List   Diagnosis Code    Closed displaced transverse fracture of shaft of left ulna S52.222A       Past Medical History:        Diagnosis Date    Acid reflux     Arthritis     Depression     Hyperlipidemia     Hypertension     Meniere disease     Wears glasses     Wears partial dentures        Past Surgical History:        Procedure Laterality Date    CATARACT REMOVAL WITH IMPLANT Bilateral     COLONOSCOPY      EVERY 5 YEARS    EYE SURGERY Left     HAND FUSION Bilateral     HERNIA REPAIR Bilateral     X4-2 ON EACH SIDE    OTHER SURGICAL HISTORY Left     SHUNT PUT IN LEFT EAR FOR MENIERE'S DISEASE    UPPP UVULOPALATOPHARYGOPLASTY      FOR SLEEP APNEA       Social History:    Social History     Tobacco Use    Smoking status: Former Smoker     Last attempt to quit: 1977     Years since quittin.0    Smokeless tobacco: Never Used   Substance Use Topics    Alcohol use: Yes     Comment: SOCIALLY                                Counseling given: Not Answered      Vital Signs (Current):   Vitals:    19 0949   Weight: 210 lb (95.3 kg)   Height: 5' 9\" (1.753 m)                                              BP Readings from Last 3 Encounters:   19 117/80   19 137/85       NPO Status:                                                                                 BMI:   Wt Readings from Last 3 Encounters:   19 210 lb (95.3 kg)   05/02/19 215 lb (97.5 kg)   04/27/19 215 lb 9.8 oz (97.8 kg)     Body mass index is 31.01 kg/m². CBC: No results found for: WBC, RBC, HGB, HCT, MCV, RDW, PLT    CMP: No results found for: NA, K, CL, CO2, BUN, CREATININE, GFRAA, AGRATIO, LABGLOM, GLUCOSE, PROT, CALCIUM, BILITOT, ALKPHOS, AST, ALT    POC Tests: No results for input(s): POCGLU, POCNA, POCK, POCCL, POCBUN, POCHEMO, POCHCT in the last 72 hours. Coags: No results found for: PROTIME, INR, APTT    HCG (If Applicable): No results found for: PREGTESTUR, PREGSERUM, HCG, HCGQUANT     ABGs: No results found for: PHART, PO2ART, YBJ2TVY, UCF8SPG, BEART, P4MZIBLD     Type & Screen (If Applicable):  No results found for: [facility]    Anesthesia Evaluation  Patient summary reviewed and Nursing notes reviewed no history of anesthetic complications:   Airway: Mallampati: II  TM distance: >3 FB   Neck ROM: full  Mouth opening: > = 3 FB Dental:    (+) partials      Pulmonary:Negative Pulmonary ROS                              Cardiovascular:  Exercise tolerance: good (>4 METS),   (+) hypertension:,     (-) pacemaker, valvular problems/murmurs, past MI, CAD, CABG/stent, dysrhythmias,  angina,  CHF, orthopnea, PND and  MENDIETA                Neuro/Psych:   (+) psychiatric history:   (-) seizures, neuromuscular disease, TIA, CVA, headaches and depression/anxiety            GI/Hepatic/Renal:   (+) GERD:,      (-) hiatal hernia, PUD, hepatitis, liver disease, no renal disease, bowel prep and no morbid obesity       Endo/Other:    (+) : arthritis: OA., .    (-) diabetes mellitus, hypothyroidism, hyperthyroidism, blood dyscrasia, no electrolyte abnormalities               Abdominal:           Vascular: negative vascular ROS. Anesthesia Plan      general     ASA 2       Induction: intravenous. Anesthetic plan and risks discussed with patient. Plan discussed with CRNA.                 Crystal Del Real MD 5/7/2019    This pre-anesthesia assessment may be used as a history and physical.    DOS STAFF ADDENDUM:    Pt seen and examined, chart reviewed (including anesthesia, drug and allergy history). No interval changes to history and physical examination. Anesthetic plan, risks, benefits, alternatives, and personnel involved discussed with patient. Patient verbalized an understanding and agrees to proceed.       Moses Hunter MD  May 7, 2019  10:38 AM

## 2019-05-07 NOTE — H&P
Pre-operative Update of H&P:    I  have seen & examined Mr. Sabas Watts related solely to his hand and upper extremity conditions, prior to the scheduled procedure on the date of his surgery. The indications for the planned surgical procedure & and his upper-extremity conditionare unchanged. Please see the Anesthesia Pre-Op Note from date of surgery for Mr. Deann Brown Orin's systemic evaluation.

## 2019-05-17 ENCOUNTER — OFFICE VISIT (OUTPATIENT)
Dept: ORTHOPEDIC SURGERY | Age: 69
End: 2019-05-17
Payer: MEDICARE

## 2019-05-17 VITALS — RESPIRATION RATE: 17 BRPM | HEIGHT: 69 IN | WEIGHT: 215 LBS | BODY MASS INDEX: 31.84 KG/M2

## 2019-05-17 DIAGNOSIS — S52.222A CLOSED DISPLACED TRANSVERSE FRACTURE OF SHAFT OF LEFT ULNA, INITIAL ENCOUNTER: Primary | ICD-10-CM

## 2019-05-17 PROCEDURE — 29075 APPL CST ELBW FNGR SHORT ARM: CPT | Performed by: ORTHOPAEDIC SURGERY

## 2019-05-17 PROCEDURE — 99024 POSTOP FOLLOW-UP VISIT: CPT | Performed by: ORTHOPAEDIC SURGERY

## 2019-05-17 NOTE — PROGRESS NOTES
Mr. Rosie Miranda returns today in follow-up of his recent left proximal ulna Fracture Repair done approximately 1 week ago. He has done well noting moderate discomfort and no other reported complications. He notes no symptoms of numbness, tingling, no symptoms related to perfusion. Physical Exam:  Skin incision is healing well, no significant drainage, no significant erythema. Digital range of motion is well maintained. FPL function is intact, EPL function is intact  Wrist range of motion is limited by pain and not stressed today. Sensation is normal in the Whole Hand. Vascular examination reveals normal and good capillary refill. Swelling is mild. There is no clinical evidence of residual skeletal deformity. Radiographic Evaluation:  Radiographs were obtained today (3 views of the left wrist). They demonstrate excellent restoration of alignment of the fracture fragments without sign of hardware loosening or failure of fixation. The distal ulnar fracture remains nondisplaced. Impression:  Mr. Rosie Miranda is doing well after recent left proximal Ulna Fracture Repair. Plan:  Mr. Rosie Miranda is today returned to a protective Short-Arm Fiberglass Cast .   He is also instructed in work on Active & Passive range of motion of the digits  & elbow. These modalities were specifically demonstrated to him today. We discussed the necessary restrictions on the use of the injured hand & wrist and the limitations on resumption of activities. We discussed the appropriate expectations and timeline for symptom improvement. We discussed the option of pursuing formalized hand therapy and a prescription  was not indicated. I have asked Mr. Rosie Miranda to follow-up with me by scheduling an appointment for 3 weeks from now at which time we will obtain repeat radiographs of the wrist out of the splint.      He is also specifically instructed to return to the office or call for an

## 2019-05-17 NOTE — Clinical Note
Dear  Georgette Koch MD,Thank you very much for your referral or Mr. Enmanuel Rodriguez to me for evaluation and treatment of his Hand & Wrist condition. I appreciate your confidence in me and thank you for allowing me the opportunity to care for your patients. If I can be of any further assistance to you on this or any other patient, please do not hesitate to contact me. Sincerely,Dontrell Pathak MD

## 2019-05-17 NOTE — PROGRESS NOTES
Charline Casey MD. ordered a short arm cast  to be applied to [facility] left upper Hand. I applied a short arm cast cast to Víctor Sr's left upper Hand. I applied 1 rolls of under padding in a 50/50 fashion. The Jamshid Armijo requested black color fiberglass. I rolled 2 rolls of fiberglass in a 50/50 fashion. His left upper Hand is in a neutral degree position Ken Walters MD .  Víctor Sr's nail beds are pink in color, the extremity is warm to the touch. Capillary refill is less than 2 seconds. Jamshid Armijo instructed on proper care of cast.  Do not get wet, keep all items out of cast.  If cast is painful please make appointment to get checked. Patient also briefed on circulation compromise. If digits are cold, blue, and tingling patient must must seek care. If after hours patient is to go to Emergency Room. During office hours patient must come in to office.

## 2019-05-21 RX ORDER — ROSUVASTATIN CALCIUM 10 MG/1
10 TABLET, COATED ORAL DAILY
COMMUNITY

## 2019-05-21 NOTE — PROGRESS NOTES
[address] [facility] time____0830________        Surgery time__0930__________    Take the following medications with a sip of water:    Do not eat or drink anything after 12:00 midnight prior to your surgery. EXCEPT PREP  This includes water chewing gum, mints and ice chips. You may brush your teeth and gargle the morning of your surgery, but do not swallow the water    You may be asked to stop blood thinners such as Coumadin, Plavix, Fragmin, Lovenox, etc., or any anti-inflammatories such as:  Aspirin, Ibuprofen, Advil, Naproxen prior to your surgery. We also ask that you stop any OTC medications such as fish oil, vitamin E, glucosamine, garlic, Multivitamins, COQ 10, etc.    We ask that you do not smoke 24 hours prior to surgery  We ask that you do not  drink any alcoholic beverages 24 hours prior to surgery     You must make arrangements for a responsible adult to take you home after your surgery. For your safety you will not be allowed to leave alone or drive yourself home. Your surgery will be cancelled if you do not have a ride home. Also for your safety, it is strongly suggested that someone stay with you the first 24 hours after your surgery. A parent or legal guardian must accompany a child scheduled for surgery and plan to stay at the hospital until the child is discharged. Please do not bring other children with you. For your comfort, please wear simple loose fitting clothing to the hospital.  Please do not bring valuables. Do not wear any make-up or nail polish on your fingers or toes      For your safety, please do not wear any jewelry or body piercing's on the day of surgery. All jewelry must be removed. If you have dentures, they will be removed before going to operating room. For your convenience, we will provide you with a container.     If you wear contact lenses or glasses, they will be removed, please bring a case for

## 2019-05-23 ENCOUNTER — ANESTHESIA EVENT (OUTPATIENT)
Dept: ENDOSCOPY | Age: 69
End: 2019-05-23
Payer: MEDICARE

## 2019-05-24 ENCOUNTER — ANESTHESIA (OUTPATIENT)
Dept: ENDOSCOPY | Age: 69
End: 2019-05-24
Payer: MEDICARE

## 2019-05-24 ENCOUNTER — HOSPITAL ENCOUNTER (OUTPATIENT)
Age: 69
Setting detail: OUTPATIENT SURGERY
Discharge: HOME | End: 2019-05-24
Attending: INTERNAL MEDICINE | Admitting: INTERNAL MEDICINE
Payer: MEDICARE

## 2019-05-24 VITALS
HEIGHT: 69 IN | TEMPERATURE: 97.1 F | DIASTOLIC BLOOD PRESSURE: 89 MMHG | OXYGEN SATURATION: 99 % | WEIGHT: 205 LBS | RESPIRATION RATE: 18 BRPM | SYSTOLIC BLOOD PRESSURE: 165 MMHG | BODY MASS INDEX: 30.36 KG/M2 | HEART RATE: 77 BPM

## 2019-05-24 VITALS — TEMPERATURE: 98.6 F | DIASTOLIC BLOOD PRESSURE: 85 MMHG | SYSTOLIC BLOOD PRESSURE: 140 MMHG | OXYGEN SATURATION: 96 %

## 2019-05-24 DIAGNOSIS — Z86.010 HISTORY OF COLON POLYPS: ICD-10-CM

## 2019-05-24 DIAGNOSIS — Z80.0 FAMILY HISTORY OF COLON CANCER: ICD-10-CM

## 2019-05-24 PROCEDURE — 3700000001 HC ADD 15 MINUTES (ANESTHESIA): Performed by: INTERNAL MEDICINE

## 2019-05-24 PROCEDURE — 6360000002 HC RX W HCPCS: Performed by: NURSE ANESTHETIST, CERTIFIED REGISTERED

## 2019-05-24 PROCEDURE — 2709999900 HC NON-CHARGEABLE SUPPLY: Performed by: INTERNAL MEDICINE

## 2019-05-24 PROCEDURE — 2580000003 HC RX 258: Performed by: ANESTHESIOLOGY

## 2019-05-24 PROCEDURE — 7100000010 HC PHASE II RECOVERY - FIRST 15 MIN: Performed by: INTERNAL MEDICINE

## 2019-05-24 PROCEDURE — 2500000003 HC RX 250 WO HCPCS: Performed by: NURSE ANESTHETIST, CERTIFIED REGISTERED

## 2019-05-24 PROCEDURE — 3609010300 HC COLONOSCOPY W/BIOPSY SINGLE/MULTIPLE: Performed by: INTERNAL MEDICINE

## 2019-05-24 PROCEDURE — 3700000000 HC ANESTHESIA ATTENDED CARE: Performed by: INTERNAL MEDICINE

## 2019-05-24 PROCEDURE — 88305 TISSUE EXAM BY PATHOLOGIST: CPT

## 2019-05-24 PROCEDURE — 7100000011 HC PHASE II RECOVERY - ADDTL 15 MIN: Performed by: INTERNAL MEDICINE

## 2019-05-24 RX ORDER — ONDANSETRON 2 MG/ML
4 INJECTION INTRAMUSCULAR; INTRAVENOUS
Status: DISCONTINUED | OUTPATIENT
Start: 2019-05-24 | End: 2019-05-24 | Stop reason: HOSPADM

## 2019-05-24 RX ORDER — PROPOFOL 10 MG/ML
INJECTION, EMULSION INTRAVENOUS PRN
Status: DISCONTINUED | OUTPATIENT
Start: 2019-05-24 | End: 2019-05-24 | Stop reason: SDUPTHER

## 2019-05-24 RX ORDER — SODIUM CHLORIDE 0.9 % (FLUSH) 0.9 %
10 SYRINGE (ML) INJECTION PRN
Status: DISCONTINUED | OUTPATIENT
Start: 2019-05-24 | End: 2019-05-24 | Stop reason: HOSPADM

## 2019-05-24 RX ORDER — LIDOCAINE HYDROCHLORIDE 20 MG/ML
INJECTION, SOLUTION EPIDURAL; INFILTRATION; INTRACAUDAL; PERINEURAL PRN
Status: DISCONTINUED | OUTPATIENT
Start: 2019-05-24 | End: 2019-05-24 | Stop reason: SDUPTHER

## 2019-05-24 RX ORDER — SODIUM CHLORIDE 9 MG/ML
INJECTION, SOLUTION INTRAVENOUS CONTINUOUS
Status: DISCONTINUED | OUTPATIENT
Start: 2019-05-24 | End: 2019-05-24 | Stop reason: HOSPADM

## 2019-05-24 RX ORDER — SODIUM CHLORIDE 0.9 % (FLUSH) 0.9 %
10 SYRINGE (ML) INJECTION EVERY 12 HOURS SCHEDULED
Status: DISCONTINUED | OUTPATIENT
Start: 2019-05-24 | End: 2019-05-24 | Stop reason: HOSPADM

## 2019-05-24 RX ADMIN — SODIUM CHLORIDE: 0.9 INJECTION, SOLUTION INTRAVENOUS at 09:01

## 2019-05-24 RX ADMIN — LIDOCAINE HYDROCHLORIDE 100 MG: 20 INJECTION, SOLUTION EPIDURAL; INFILTRATION; INTRACAUDAL; PERINEURAL at 09:51

## 2019-05-24 RX ADMIN — PROPOFOL 100 MG: 10 INJECTION, EMULSION INTRAVENOUS at 09:59

## 2019-05-24 RX ADMIN — PROPOFOL 100 MG: 10 INJECTION, EMULSION INTRAVENOUS at 10:08

## 2019-05-24 RX ADMIN — PROPOFOL 150 MG: 10 INJECTION, EMULSION INTRAVENOUS at 09:51

## 2019-05-24 ASSESSMENT — PAIN DESCRIPTION - DESCRIPTORS
DESCRIPTORS: PRESSURE
DESCRIPTORS: DULL
DESCRIPTORS: PRESSURE

## 2019-05-24 ASSESSMENT — PAIN SCALES - WONG BAKER: WONGBAKER_NUMERICALRESPONSE: 0

## 2019-05-24 ASSESSMENT — PAIN DESCRIPTION - FREQUENCY
FREQUENCY: CONTINUOUS
FREQUENCY: CONTINUOUS

## 2019-05-24 ASSESSMENT — PAIN - FUNCTIONAL ASSESSMENT
PAIN_FUNCTIONAL_ASSESSMENT: ACTIVITIES ARE NOT PREVENTED
PAIN_FUNCTIONAL_ASSESSMENT: ACTIVITIES ARE NOT PREVENTED
PAIN_FUNCTIONAL_ASSESSMENT: 0-10

## 2019-05-24 ASSESSMENT — PAIN DESCRIPTION - ORIENTATION
ORIENTATION: MID
ORIENTATION: MID

## 2019-05-24 ASSESSMENT — PAIN DESCRIPTION - PROGRESSION
CLINICAL_PROGRESSION: GRADUALLY IMPROVING
CLINICAL_PROGRESSION: GRADUALLY IMPROVING

## 2019-05-24 ASSESSMENT — PAIN SCALES - GENERAL
PAINLEVEL_OUTOF10: 3
PAINLEVEL_OUTOF10: 3

## 2019-05-24 ASSESSMENT — PAIN DESCRIPTION - PAIN TYPE
TYPE: ACUTE PAIN
TYPE: ACUTE PAIN

## 2019-05-24 ASSESSMENT — PAIN DESCRIPTION - LOCATION
LOCATION: ABDOMEN
LOCATION: ABDOMEN

## 2019-05-24 ASSESSMENT — ENCOUNTER SYMPTOMS: SHORTNESS OF BREATH: 0

## 2019-05-24 NOTE — PROCEDURES
[facility]  Endoscopy Note    Patient: Quique Xiong  : 1950  Acct#: [de-identified]    Procedure: Colonoscopy with biopsy    Date:  2019    Surgeon:  Yrn Johnston MD    Referring Physician:  Marline Altman    Previous Colonoscopy: Yes  Date: unknown  Greater than 3 years? Yes    Preoperative Diagnosis:  surveillance    Postoperative Diagnosis:  Colon polyps    Anesthesia:  See anesthesia note    Indications: This is a 77y.o. year old male who presents today with previous adenomatous polyp. Procedure: An informed consent was obtained from the patient after explanation of indications, benefits, possible risks and complications of the procedure. The patient was then taken to the endoscopy suite, placed in the left lateral decubitus position, and the above IV anesthesia was administered. A digital rectal examination was performed and revealed negative without mass, lesions or tenderness. The Olympus CFQ-180-AL video colonoscope was placed in the patient's rectum under digital direction and advanced to the cecum. The cecum was identified by characteristic anatomy and ballottment. The ileocecal valve was identified. The preparation was excellent. The scope was then withdrawn back through the cecum, ascending, transverse, descending and sigmoid colons. Carefull circumferential examination of the mucosa in these areas demonstrated two 2-3 mm polyps in the descending colon that were biopsied and removed. The scope was then withdrawn into the rectum and retroflexed. The retroflexed view of the anal verge and rectum demonstrates no abnormalities. The scope was straightened, the colon was decompressed and the scope was withdrawn from the patient. The patient tolerated the procedure well and was taken to the PACU in good condition. Estimated Blood Loss:  none    Impression:  Colon polyps    Recommendations:  Await pathology. Repeat colonoscopy in 5 years.     Yrn Johnston MD [facility]  5/24/2019

## 2019-05-24 NOTE — ANESTHESIA PRE PROCEDURE
Department of Anesthesiology  Preprocedure Note       Name:  Law Yousif   Age:  77 y.o.  :  1950                                          MRN:  [id number]         Date:  2019      Surgeon: Chelita Pan):  Jose Miguel Lawrence MD    Procedure: COLONOSCOPY DIAGNOSTIC (N/A )    Medications prior to admission:   Prior to Admission medications    Medication Sig Start Date End Date Taking?  Authorizing Provider   rosuvastatin (CRESTOR) 10 MG tablet Take 10 mg by mouth daily   Yes Historical Provider, MD   Ascorbic Acid (VITAMIN C) 500 MG CAPS Take 500 mg by mouth daily    Yes Historical Provider, MD   B Complex Vitamins (B COMPLEX 1 PO) B Complex   1 daily 16  Yes Historical Provider, MD   Omega-3 Fatty Acids (FISH OIL) 1000 MG CPDR Take 1 capsule by mouth daily    Yes Historical Provider, MD   buPROPion (WELLBUTRIN XL) 150 MG extended release tablet Take 150 mg by mouth every morning  3/11/19  Yes Historical Provider, MD   lisinopril (PRINIVIL;ZESTRIL) 10 MG tablet Take by mouth daily  3/11/19  Yes Historical Provider, MD   melatonin 5 MG TABS tablet melatonin 5 mg tablet   Take 1 tablet every day by oral route at BEDTIME   Yes Historical Provider, MD   ranitidine (ZANTAC) 150 MG tablet ranitidine 150 mg tablet   TAKE ONE TABLET BY MOUTH DAILY NIGHTLY   Yes Historical Provider, MD   acetaminophen (TYLENOL) 325 MG tablet Take 650 mg by mouth daily   Yes Historical Provider, MD   Cholecalciferol (VITAMIN D-3 PO) Vitamin D3 DAILY    Historical Provider, MD   cyclobenzaprine (FLEXERIL) 10 MG tablet Take 10 mg by mouth 3 times daily as needed  12/21/10   Historical Provider, MD       Current medications:    Current Facility-Administered Medications   Medication Dose Route Frequency Provider Last Rate Last Dose    0.9 % sodium chloride infusion   Intravenous Continuous Evans Dominguez MD        sodium chloride flush 0.9 % injection 10 mL  10 mL Intravenous 2 times per day MD Teofilo Caba sodium chloride flush 0.9 % injection 10 mL  10 mL Intravenous PRN January Roach MD           Allergies:     Allergies   Allergen Reactions    Vicodin [Hydrocodone-Acetaminophen] Nausea Only     SWEATING,DIZZY       Problem List:    Patient Active Problem List   Diagnosis Code    Closed displaced transverse fracture of shaft of left ulna S52.222A       Past Medical History:        Diagnosis Date    Acid reflux     Arthritis     Depression     Hyperlipidemia     Hypertension     Meniere disease     Wears glasses     Wears partial dentures        Past Surgical History:        Procedure Laterality Date    CATARACT REMOVAL WITH IMPLANT Bilateral     COLONOSCOPY      EVERY 5 YEARS    EYE SURGERY Left     FRACTURE SURGERY      HAND FUSION Bilateral     HERNIA REPAIR Bilateral     X4-2 ON EACH SIDE    ORIF PROXIMAL ULNA FRACTURE Left 2019    OPEN REDUCTION AND INTERNAL FIXATION LEFT ULNA SHAFT FRACTURE WITH MINI C-ARM performed by Robbie Baumgarten, MD at [place] [address] Left     SHUNT PUT IN LEFT EAR FOR MENIERE'S DISEASE    UPPP UVULOPALATOPHARYGOPLASTY      FOR SLEEP APNEA       Social History:    Social History     Tobacco Use    Smoking status: Former Smoker     Last attempt to quit: 1977     Years since quittin.0    Smokeless tobacco: Never Used   Substance Use Topics    Alcohol use: Yes     Comment: SOCIALLY                                Counseling given: Not Answered      Vital Signs (Current):   Vitals:    19 1214 19 0851   BP:  (!) 157/89   Pulse:  80   Resp:  18   Temp:  96.6 °F (35.9 °C)   TempSrc:  Temporal   SpO2:  97%   Weight: 212 lb (96.2 kg) 205 lb (93 kg)   Height: 5' 9\" (1.753 m) 5' 9\" (1.753 m)                                              BP Readings from Last 3 Encounters:   19 (!) 157/89   19 119/61   19 (!) 148/90       NPO Status:   clears >4hrs BMI:   Wt Readings from Last 3 Encounters:   05/24/19 205 lb (93 kg)   05/17/19 215 lb (97.5 kg)   05/07/19 210 lb (95.3 kg)     Body mass index is 30.27 kg/m². CBC: No results found for: WBC, RBC, HGB, HCT, MCV, RDW, PLT    CMP: No results found for: NA, K, CL, CO2, BUN, CREATININE, GFRAA, AGRATIO, LABGLOM, GLUCOSE, PROT, CALCIUM, BILITOT, ALKPHOS, AST, ALT    POC Tests: No results for input(s): POCGLU, POCNA, POCK, POCCL, POCBUN, POCHEMO, POCHCT in the last 72 hours. Coags: No results found for: PROTIME, INR, APTT    HCG (If Applicable): No results found for: PREGTESTUR, PREGSERUM, HCG, HCGQUANT     ABGs: No results found for: PHART, PO2ART, KIE0PDU, BAX7GKJ, BEART, [id number]     Type & Screen (If Applicable):  No results found for: [facility]    Anesthesia Evaluation  Patient summary reviewed no history of anesthetic complications:   Airway: Mallampati: II  TM distance: >3 FB   Neck ROM: full  Comment: No uvula-removed  Mouth opening: > = 3 FB Dental:    (+) partials      Pulmonary: breath sounds clear to auscultation      (-) COPD, asthma, shortness of breath, recent URI and sleep apnea                          ROS comment: Hx UPP for sleep apnea   Cardiovascular:    (+) hypertension:, hyperlipidemia    (-) valvular problems/murmurs, past MI, CAD, CABG/stent, dysrhythmias,  angina,  CHF and murmur      Rhythm: regular  Rate: normal                    Neuro/Psych:   (+) psychiatric history:depression/anxiety    (-) seizures, neuromuscular disease, TIA and CVA           GI/Hepatic/Renal:   (+) GERD: well controlled, bowel prep,      (-) PUD, hepatitis, liver disease and no renal disease       Endo/Other:        (-) diabetes mellitus, hypothyroidism, hyperthyroidism                ROS comment: Has broken left ulna-cast Abdominal:           Vascular:                                        Anesthesia Plan      TIVA     ASA 3       Induction: intravenous.       Anesthetic plan and risks discussed with patient. Plan discussed with CRNA. This pre-anesthesia assessment may be used as a history and physical.    DOS STAFF ADDENDUM:    Pt seen and examined, chart reviewed (including anesthesia, drug and allergy history). No interval changes to history and physical examination. Anesthetic plan, risks, benefits, alternatives, and personnel involved discussed with patient. Patient verbalized an understanding and agrees to proceed.       Sylvia Key MD  May 24, 2019  9:01 AM      Sylvia Key MD   5/24/2019

## 2019-05-24 NOTE — ANESTHESIA POSTPROCEDURE EVALUATION
Department of Anesthesiology  Postprocedure Note    Patient: Kayce Méndez  MRN: [id number]  YOB: 1950  Date of evaluation: 5/24/2019  Time:  10:56 AM     Procedure Summary     Date:  05/24/19 Room / Location:  [facility] ENDO 02 / [facility] ENDOSCOPY    Anesthesia Start:  1522 Anesthesia Stop:  1451    Procedure:  COLONOSCOPY WITH BIOPSY (N/A ) Diagnosis:       History of colon polyps      Family history of colon cancer      (HISTORY OF POLYPS, FAMILY HISTORY OF COLON CANCER)    Surgeon:  Austen Mark MD Responsible Provider:  Roldan Pelaez MD    Anesthesia Type:  TIVA ASA Status:  3          Anesthesia Type: TIVA    Jannette Phase I: Jannette Score: 10    Jannette Phase II: Jannette Score: 10    Last vitals: Reviewed and per EMR flowsheets.        Anesthesia Post Evaluation    Patient location during evaluation: PACU  Patient participation: complete - patient participated  Level of consciousness: awake and alert  Airway patency: patent  Nausea & Vomiting: no nausea and no vomiting  Complications: no  Cardiovascular status: hemodynamically stable  Respiratory status: acceptable  Hydration status: stable

## 2019-05-24 NOTE — H&P
[facility]   Pre-operative History and Physical    Patient: Aleida Pérez  : 1950  Acct#: [de-identified]    History Obtained From: electronic medical record    HISTORY OF PRESENT ILLNESS  Procedure:Colonoscopy  Indications:surveillance  Past Medical History:        Diagnosis Date    Acid reflux     Arthritis     Depression     Hyperlipidemia     Hypertension     Meniere disease     Wears glasses     Wears partial dentures      Past Surgical History:        Procedure Laterality Date    CATARACT REMOVAL WITH IMPLANT Bilateral     COLONOSCOPY      EVERY 5 YEARS    EYE SURGERY Left     FRACTURE SURGERY      HAND FUSION Bilateral     HERNIA REPAIR Bilateral     X4-2 ON EACH SIDE    ORIF PROXIMAL ULNA FRACTURE Left 2019    OPEN REDUCTION AND INTERNAL FIXATION LEFT ULNA SHAFT FRACTURE WITH MINI C-ARM performed by Valeriano Coronel MD at [facility] 1724 Left     SHUNT PUT IN LEFT EAR FOR MENIERE'S DISEASE    UPPP UVULOPALATOPHARYGOPLASTY      FOR SLEEP APNEA     Medications prior to admission:   Prior to Admission medications    Medication Sig Start Date End Date Taking?  Authorizing Provider   rosuvastatin (CRESTOR) 10 MG tablet Take 10 mg by mouth daily   Yes Historical Provider, MD   Ascorbic Acid (VITAMIN C) 500 MG CAPS Take 500 mg by mouth daily    Yes Historical Provider, MD   B Complex Vitamins (B COMPLEX 1 PO) B Complex   1 daily 16  Yes Historical Provider, MD   Omega-3 Fatty Acids (FISH OIL) 1000 MG CPDR Take 1 capsule by mouth daily    Yes Historical Provider, MD   buPROPion (WELLBUTRIN XL) 150 MG extended release tablet Take 150 mg by mouth every morning  3/11/19  Yes Historical Provider, MD   lisinopril (PRINIVIL;ZESTRIL) 10 MG tablet Take by mouth daily  3/11/19  Yes Historical Provider, MD   melatonin 5 MG TABS tablet melatonin 5 mg tablet   Take 1 tablet every day by oral route at BEDTIME   Yes Historical Provider, MD   ranitidine (ZANTAC) 150 MG tablet ranitidine 150 mg tablet   TAKE ONE TABLET BY MOUTH DAILY NIGHTLY   Yes Historical Provider, MD   acetaminophen (TYLENOL) 325 MG tablet Take 650 mg by mouth daily   Yes Historical Provider, MD   Cholecalciferol (VITAMIN D-3 PO) Vitamin D3 DAILY    Historical Provider, MD   cyclobenzaprine (FLEXERIL) 10 MG tablet Take 10 mg by mouth 3 times daily as needed  12/21/10   Historical Provider, MD     Allergies:   Vicodin [hydrocodone-acetaminophen]    Social History     Socioeconomic History    Marital status:      Spouse name: Not on file    Number of children: Not on file    Years of education: Not on file    Highest education level: Not on file   Occupational History    Occupation: retired   Social Needs    Financial resource strain: Not on file    Food insecurity:     Worry: Not on file     Inability: Not on file   Crittercism needs:     Medical: Not on file     Non-medical: Not on file   Tobacco Use    Smoking status: Former Smoker     Last attempt to quit: 1977     Years since quittin.0    Smokeless tobacco: Never Used   Substance and Sexual Activity    Alcohol use: Yes     Comment: SOCIALLY    Drug use: Never    Sexual activity: Not Currently   Lifestyle    Physical activity:     Days per week: Not on file     Minutes per session: Not on file    Stress: Not on file   Relationships    Social connections:     Talks on phone: Not on file     Gets together: Not on file     Attends Advent service: Not on file     Active member of club or organization: Not on file     Attends meetings of clubs or organizations: Not on file     Relationship status: Not on file    Intimate partner violence:     Fear of current or ex partner: Not on file     Emotionally abused: Not on file     Physically abused: Not on file     Forced sexual activity: Not on file   Other Topics Concern    Not on file   Social History Narrative    Not on file     Family History   Problem Relation Age of Onset

## 2019-06-07 ENCOUNTER — OFFICE VISIT (OUTPATIENT)
Dept: ORTHOPEDIC SURGERY | Age: 69
End: 2019-06-07
Payer: MEDICARE

## 2019-06-07 VITALS — HEIGHT: 69 IN | BODY MASS INDEX: 30.36 KG/M2 | WEIGHT: 205 LBS | RESPIRATION RATE: 16 BRPM

## 2019-06-07 DIAGNOSIS — S52.222A CLOSED DISPLACED TRANSVERSE FRACTURE OF SHAFT OF LEFT ULNA, INITIAL ENCOUNTER: Primary | ICD-10-CM

## 2019-06-07 PROCEDURE — 99024 POSTOP FOLLOW-UP VISIT: CPT | Performed by: ORTHOPAEDIC SURGERY

## 2019-06-07 PROCEDURE — 29075 APPL CST ELBW FNGR SHORT ARM: CPT | Performed by: ORTHOPAEDIC SURGERY

## 2019-06-07 NOTE — Clinical Note
Dear  Lian Beltran MD,Thank you very much for your referral or Mr. Alyssa Hodge to me for evaluation and treatment of his Hand & Wrist condition. I appreciate your confidence in me and thank you for allowing me the opportunity to care for your patients. If I can be of any further assistance to you on this or any other patient, please do not hesitate to contact me. Sincerely,Dontrell Minor MD

## 2019-06-07 NOTE — PROGRESS NOTES
I applied a Short Arm Fiberglass Cast to Jose Sr's Left Wrist.  I applied casting stockinette,  1 rolls of padding in an overlapping fashion. Renetta Thompson requested Black color fiberglass. I rolled 2 rolls of fiberglass in an overlapping fashion. His  Left, Wrist was maintained in a 0 degree Neutral Alignment. At the conclusion of the procedure, Jose Sr's nail beds were pink in color, the extremity is warm to the touch. Capillary refill is less than 2 seconds. Renetta Thompson was instructed in proper care of cast.  Do not get wet, keep all items out of cast.  If cast is painful please make appointment to get checked. He was also briefed on circulation compromise. If digits are cold, blue, and tingling patient must must seek care. If after hours patient is to go to Emergency Room. During office hours patient must come in to office.

## 2019-06-07 NOTE — PROGRESS NOTES
Mr. Carli Goncalves returns today in follow-up of his recent left Ulna Fracture Repair done approximately 4 weeks ago. He has done well noting mild discomfort and no other reported complications. He notes no symptoms of numbness, tingling, no symptoms related to perfusion. Physical Exam:  Skin incision is healing well, without erythema, drainage or sign of infection. Digital range of motion is Full and equal bilaterally. FPL function is Intact, EPL function is Intact  Wrist range of motion is limited by painand not stressed today. Sensation is normal in the Whole Hand. Vascular examination reveals normal and good capillary refill. Swelling is minimal.  There is no clinical evidence of residual skeletal deformity. Radiographic Evaluation:  Radiographs were obtained today (3 views of the left forearm). They demonstrate excellent restoration of alignment of the fracture fragments without sign of hardware loosening or failure of fixation. Impression:  Mr. Carli Goncalves is doing well after recent left Ulna Fracture Repair. Plan:  Mr. Carli Goncalves is today fitted with a new Short-Arm Fiberglass Cast and given instructions regarding the appropriate wear and use of the device. He is also instructed in work on Active & Passive range of motion of the digits  & elbow. These modalities were specifically demonstrated to him today. We discussed the necessary restrictions on the use of the injured hand & wrist and the limitations on resumption of activities. He is given instructions regarding management of the fresh surgical incision and progressive use of desensitization and tissue massage techniques. We discussed the appropriate expectations and timeline for symptom improvement. We discussed the option of pursuing formalized hand therapy and a prescription  was not indicated.     He is provided a written patient instruction sheet titled: Postoperative Instructions After Wrist Fracture Repair -- #1. As this patient has demonstrated risk-factors for Osteoporosis, and recent history of a fracture, I have referred him back to his Primary Care Physician for evaluation of Osteoporosis including possible consideration for DEXA Scan, if this is felt to be clinically indicated. Mr. Fercho Goodman is instructed to contact his Primary Care Physician to discuss and arrange such evaluation. I have asked Mr. Fercho Goodman to follow-up with me by scheduling an appointment for 3 weeks from now at which time we will obtain repeat radiographs of the wrist out of the splint. He is also specifically instructed to return to the office or call for an appointment sooner if his symptoms are changing or worsening prior to that time.

## 2019-06-12 ENCOUNTER — HOSPITAL ENCOUNTER (OUTPATIENT)
Dept: MRI IMAGING | Age: 69
Discharge: HOME | End: 2019-06-12
Payer: MEDICARE

## 2019-06-12 DIAGNOSIS — G25.0 ESSENTIAL TREMOR: ICD-10-CM

## 2019-06-12 DIAGNOSIS — G60.9 IDIOPATHIC POLYNEUROPATHY: ICD-10-CM

## 2019-06-12 DIAGNOSIS — R26.0 ATAXIC GAIT: ICD-10-CM

## 2019-06-12 PROCEDURE — 70551 MRI BRAIN STEM W/O DYE: CPT

## 2019-07-01 ENCOUNTER — HOSPITAL ENCOUNTER (EMERGENCY)
Age: 69
Discharge: HOME | End: 2019-07-01
Payer: MEDICARE

## 2019-07-01 ENCOUNTER — APPOINTMENT (OUTPATIENT)
Dept: GENERAL RADIOLOGY | Age: 69
End: 2019-07-01
Payer: MEDICARE

## 2019-07-01 VITALS
RESPIRATION RATE: 17 BRPM | TEMPERATURE: 98.2 F | HEIGHT: 69 IN | WEIGHT: 215.61 LBS | OXYGEN SATURATION: 99 % | BODY MASS INDEX: 31.93 KG/M2 | HEART RATE: 78 BPM | DIASTOLIC BLOOD PRESSURE: 71 MMHG | SYSTOLIC BLOOD PRESSURE: 138 MMHG

## 2019-07-01 DIAGNOSIS — L03.115 CELLULITIS OF RIGHT FOOT: Primary | ICD-10-CM

## 2019-07-01 PROCEDURE — 73630 X-RAY EXAM OF FOOT: CPT

## 2019-07-01 PROCEDURE — 99283 EMERGENCY DEPT VISIT LOW MDM: CPT

## 2019-07-01 PROCEDURE — 96372 THER/PROPH/DIAG INJ SC/IM: CPT

## 2019-07-01 PROCEDURE — 6360000002 HC RX W HCPCS: Performed by: NURSE PRACTITIONER

## 2019-07-01 PROCEDURE — 2500000003 HC RX 250 WO HCPCS: Performed by: NURSE PRACTITIONER

## 2019-07-01 RX ORDER — CEPHALEXIN 500 MG/1
500 CAPSULE ORAL 4 TIMES DAILY
Qty: 40 CAPSULE | Refills: 0 | Status: SHIPPED | OUTPATIENT
Start: 2019-07-01 | End: 2019-07-11

## 2019-07-01 RX ORDER — LIDOCAINE HYDROCHLORIDE 10 MG/ML
2.1 INJECTION, SOLUTION EPIDURAL; INFILTRATION; INTRACAUDAL; PERINEURAL ONCE
Status: COMPLETED | OUTPATIENT
Start: 2019-07-01 | End: 2019-07-01

## 2019-07-01 RX ORDER — HYDROCODONE BITARTRATE AND ACETAMINOPHEN 5; 325 MG/1; MG/1
1 TABLET ORAL EVERY 6 HOURS PRN
Qty: 10 TABLET | Refills: 0 | Status: SHIPPED | OUTPATIENT
Start: 2019-07-01 | End: 2019-07-04

## 2019-07-01 RX ORDER — CEFTRIAXONE 1 G/1
1 INJECTION, POWDER, FOR SOLUTION INTRAMUSCULAR; INTRAVENOUS ONCE
Status: COMPLETED | OUTPATIENT
Start: 2019-07-01 | End: 2019-07-01

## 2019-07-01 RX ADMIN — CEFTRIAXONE 1 G: 1 INJECTION, POWDER, FOR SOLUTION INTRAMUSCULAR; INTRAVENOUS at 04:28

## 2019-07-01 RX ADMIN — LIDOCAINE HYDROCHLORIDE 2.1 ML: 10 INJECTION, SOLUTION EPIDURAL; INFILTRATION; INTRACAUDAL; PERINEURAL at 04:28

## 2019-07-01 ASSESSMENT — PAIN DESCRIPTION - PAIN TYPE
TYPE: ACUTE PAIN
TYPE: ACUTE PAIN

## 2019-07-01 ASSESSMENT — PAIN DESCRIPTION - PROGRESSION: CLINICAL_PROGRESSION: GRADUALLY IMPROVING

## 2019-07-01 ASSESSMENT — PAIN DESCRIPTION - ORIENTATION: ORIENTATION: RIGHT

## 2019-07-01 ASSESSMENT — PAIN SCALES - GENERAL
PAINLEVEL_OUTOF10: 5
PAINLEVEL_OUTOF10: 3

## 2019-07-01 ASSESSMENT — PAIN DESCRIPTION - FREQUENCY
FREQUENCY: CONTINUOUS
FREQUENCY: CONTINUOUS

## 2019-07-01 ASSESSMENT — PAIN - FUNCTIONAL ASSESSMENT
PAIN_FUNCTIONAL_ASSESSMENT: PREVENTS OR INTERFERES SOME ACTIVE ACTIVITIES AND ADLS
PAIN_FUNCTIONAL_ASSESSMENT: 0-10

## 2019-07-01 ASSESSMENT — PAIN DESCRIPTION - DESCRIPTORS
DESCRIPTORS: OTHER (COMMENT)
DESCRIPTORS: THROBBING

## 2019-07-01 ASSESSMENT — ENCOUNTER SYMPTOMS
SHORTNESS OF BREATH: 0
COLOR CHANGE: 1

## 2019-07-01 ASSESSMENT — PAIN DESCRIPTION - LOCATION
LOCATION: TOE (COMMENT WHICH ONE)
LOCATION: FOOT

## 2019-07-01 ASSESSMENT — PAIN DESCRIPTION - ONSET: ONSET: PROGRESSIVE

## 2019-07-01 NOTE — ED PROVIDER NOTES
**EVALUATED BY ADVANCED PRACTICE PROVIDER**        [address]      Pt Name: Tomasz Ag  TMV:[id number]  Maggiegfjose daniel 1950  Date of evaluation: 6/30/2019  Provider: Ángel Fraga, APRN - CNP      Chief Complaint:    Chief Complaint   Patient presents with    Toe Pain     swelling, pain, redness        Nursing Notes, Past Medical Hx, Past Surgical Hx, Social Hx, Allergies, and Family Hx were all reviewed and agreed with or any disagreements were addressed in the HPI.    HPI:  (Location, Duration, Timing, Severity,Quality, Assoc Sx, Context, Modifying factors)  This is a  77 y.o. male who presents to the emergency department today complaining of redness, swelling, warmth, and pain to the top of the right foot. Onset was 3 days ago. Symptoms started spontaneously and have been constant. He rates the pain 5/10. Pain is exacerbated with ambulation and weightbearing. No fever or chills.     PastMedical/Surgical History:      Diagnosis Date    Acid reflux     Arthritis     Depression     Hyperlipidemia     Hypertension     Meniere disease     Wears glasses     Wears partial dentures          Procedure Laterality Date    CATARACT REMOVAL WITH IMPLANT Bilateral     COLONOSCOPY      EVERY 5 YEARS    COLONOSCOPY N/A 5/24/2019    COLONOSCOPY WITH BIOPSY performed by Rupal Barnes MD at [address] [place] [address] Left     FRACTURE SURGERY      HAND FUSION Bilateral     HERNIA REPAIR Bilateral     X4-2 ON EACH SIDE    ORIF PROXIMAL ULNA FRACTURE Left 5/7/2019    OPEN REDUCTION AND INTERNAL FIXATION LEFT ULNA SHAFT FRACTURE WITH MINI C-ARM performed by Arianne Wolf MD at [address] [place] [address] Left     SHUNT PUT IN LEFT EAR FOR MENIERE'S DISEASE    UPPP UVULOPALATOPHARYGOPLASTY      FOR SLEEP APNEA       Medications:  Previous Medications    ACETAMINOPHEN (TYLENOL) 325 MG TABLET    Take 650 mg by mouth daily redness swelling on top of foot and pain and redness middle toe FINDINGS: Unchanged subluxation at the 3rd DIP joint. Osteoarthrosis about various IP joints as well as the 1st MTP joint. Soft tissue swelling about the middle toe. No soft tissue gas. Soft tissue swelling about the middle/3rd toe with chronic subluxation at the DIP joint. No soft tissue gas or acute fracture. Background osteoarthrosis about the foot is unchanged. MEDICAL DECISION MAKING / ED COURSE:      PROCEDURES:   Procedures    None    Patient was given:  Medications   cefTRIAXone (ROCEPHIN) injection 1 g (has no administration in time range)       Differential diagnosis: Necrotizing fasciitis, cellulitis, erysipelas, suppurative thrombophlebitis, aseptic superficial thrombophlebitis, DVT, gout, compartment syndrome, erythema migrans (lyme disease), contact dermatitis, lymphedema, other    Patient seen and examined today for pain to right foot. See HPI for patient presentation. Patient is hemodynamically stable, nontoxic, afebrile, and without tachycardia, tachypnea, and hypoxia. Physical exam as above. Well-appearing 68-year-old male lying in bed in no acute distress. Redness swelling and warmth of the right foot with involvement of the 3rd toe. X-ray shows no soft tissue gas or acute fracture. Distal extremity is pink and well-perfused. Patient was given Rocephin and Keflex for cellulitis as well as pain medication. I advised him to follow-up with his PCP in the next couple days for reassessment. He is not febrile or tachycardic and I do not feel further work-up is warranted. At this time, the evidence for any other entities in the differential is insufficient to justify any further testing. This was explained to the patient. The patient was advised that persistent or worsening symptoms will require further evaluation. The patient tolerated their visit well. I evaluated the patient.   The physician was available

## 2019-07-08 ENCOUNTER — OFFICE VISIT (OUTPATIENT)
Dept: ORTHOPEDIC SURGERY | Age: 69
End: 2019-07-08

## 2019-07-08 VITALS — HEIGHT: 69 IN | RESPIRATION RATE: 16 BRPM | WEIGHT: 215 LBS | BODY MASS INDEX: 31.84 KG/M2

## 2019-07-08 DIAGNOSIS — S52.222A CLOSED DISPLACED TRANSVERSE FRACTURE OF SHAFT OF LEFT ULNA, INITIAL ENCOUNTER: Primary | ICD-10-CM

## 2019-07-08 PROCEDURE — 99024 POSTOP FOLLOW-UP VISIT: CPT | Performed by: ORTHOPAEDIC SURGERY

## 2019-07-08 NOTE — PROGRESS NOTES
Mr. Berta Reid returns today in follow-up of his recent left Ulna Fracture Repair done approximately 8 weeks ago. He has noted absent discomfort and decreased swelling. He notes no symptoms of numbness, tingling, no symptoms related to perfusion. Physical Exam:  Skin incision is fully healed , nontender. Digital range of motion is Full and equal bilaterally. FPL function is Intact, EPL function is Intact  Wrist range of motion is stiff from immobilization with mild discomfort at the extremes. Sensation is normal in the Whole Hand. Vascular examination reveals normal and good capillary refill. Swelling is mild. There is no clinical evidence of residual skeletal deformity. Radiographic Evaluation:  Radiographs were obtained today (3 views of the left wrist). They demonstrate excellent maintenance of alignment of the fracture fragments, moderate amount of interval healing of the fracture without sign of hardware loosening or failure of fixation. The fracture has united. Impression:  Mr. Berta Reid is doing well after recent left Ulna Fracture Repair which is now healed. Plan:  I have explained to Mr. Berta Reid that I feel that his fracture is now healed. I have instructed him to wean from the use of his removable forearm based orthosis. We discussed the gradual resumption of activity as comfort and motion allow. He is also instructed in continued aggressive work on Active & Passive range of motion of the digits, wrist, & elbow. We discussed the transition to un-restricted use of the injured hand & wrist.  I have explained the time course and likely expectations for maximal recovery of motion and function. We discussed the appropriate expectations and timeline for symptom improvement. We discussed the option of pursuing formalized hand therapy and a prescription  was not indicated. I have asked Mr. Berta Reid to follow-up with me at any time in the PGY1/MD [facility]. 67 yo F with no PMH, s/p fall, last night due to weakness. She has right hand + right foot pain, went to urgent care center today and sent for fracture evaluation. When fall last night, she had general weakness but denies loc, tingling/numbness or difficulty speaking/walking. She has had sour throat, muscle pain, mile fever x 5days followed by cough with no sputum production. Pt took tylenol this morning, for hand/foot pain.

## 2020-12-30 ENCOUNTER — TELEPHONE (OUTPATIENT)
Dept: ORTHOPEDIC SURGERY | Age: 70
End: 2020-12-30

## 2020-12-30 NOTE — TELEPHONE ENCOUNTER
I called the patient. He said his doctor's office made his apt and he needed to do the [facility] screening. He is ok to be seen next week. He was informed if he develops symptoms he needs to reschedule.

## 2021-01-05 ENCOUNTER — OFFICE VISIT (OUTPATIENT)
Dept: ORTHOPEDIC SURGERY | Age: 71
End: 2021-01-05
Payer: MEDICARE

## 2021-01-05 ENCOUNTER — PREP FOR PROCEDURE (OUTPATIENT)
Dept: ORTHOPEDIC SURGERY | Age: 71
End: 2021-01-05

## 2021-01-05 VITALS — TEMPERATURE: 96.8 F | WEIGHT: 229.3 LBS | BODY MASS INDEX: 33.96 KG/M2 | HEIGHT: 69 IN

## 2021-01-05 DIAGNOSIS — M25.562 LEFT KNEE PAIN, UNSPECIFIED CHRONICITY: Primary | ICD-10-CM

## 2021-01-05 DIAGNOSIS — Z01.818 PREOP TESTING: Primary | ICD-10-CM

## 2021-01-05 DIAGNOSIS — M17.12 PRIMARY OSTEOARTHRITIS OF LEFT KNEE: ICD-10-CM

## 2021-01-05 PROCEDURE — G8427 DOCREV CUR MEDS BY ELIG CLIN: HCPCS | Performed by: ORTHOPAEDIC SURGERY

## 2021-01-05 PROCEDURE — 99203 OFFICE O/P NEW LOW 30 MIN: CPT | Performed by: ORTHOPAEDIC SURGERY

## 2021-01-05 PROCEDURE — G8417 CALC BMI ABV UP PARAM F/U: HCPCS | Performed by: ORTHOPAEDIC SURGERY

## 2021-01-05 PROCEDURE — G8484 FLU IMMUNIZE NO ADMIN: HCPCS | Performed by: ORTHOPAEDIC SURGERY

## 2021-01-05 RX ORDER — GABAPENTIN 300 MG/1
CAPSULE ORAL
Qty: 35 CAPSULE | Refills: 0 | Status: SHIPPED | OUTPATIENT
Start: 2021-01-05 | End: 2021-02-08

## 2021-01-05 NOTE — PROGRESS NOTES
Ana Amaro  <[id number]>  January 5, 2021    Chief Complaint   Patient presents with   [facility] New Patient     L knee        History: The patient is a 64-year-old gentleman who is here for evaluation of his left knee. He has had left knee pain for approximately 2 years. The pain has progressed over the past several months. She denies any history of injury. He has tried a brace without much improvement. He has tried ibuprofen and Motrin without much improvement. He has difficulty getting up from a seated position. He has difficulty with stairs. He does have pain at nighttime. The patient did have a medial meniscectomy performed nearly 40 years ago and he recovered uneventfully. He denies any numbness or tingling. This is a consult from MD Isaias for left knee pain and swelling. The patient's  past medical history, medications, allergies,  family history, social history, and have been reviewed, and dated and are recorded in the chart. Pertinent items are noted in HPI. Review of systems reviewed from Pertinent History Form dated on 1/5/21 and available in the patient's chart under the Media tab.      Vitals:  Temp 96.8 °F (36 °C) (Infrared)   Ht 5' 9\" (1.753 m)   Wt 229 lb 4.8 oz (104 kg)   BMI 33.86 kg/m² Physical: Mr. Archana Ivey appears well, he is in no apparent distress, he demonstrates appropriate mood & affect. He is alert and oriented to person, place and time. Examination of the left lower extremity reveals no pain with range of motion of the hip. He has generalized tenderness to palpation about the joint line of the left knee. Range of motion is from -1 degrees to 120 degrees. Strength is 4+ to 5/5 for all muscle groups about the left knee. There is patellofemoral crepitus with range of motion of the left knee. Varus and valgus stressing of the knees reveals no evidence of instability. There is a small effusion in the left knee. Anterior drawer and Lachman are negative bilaterally. Examination of the skin reveals no rashes, ulceration, or lesion, bilaterally in the lower extremities. Sensation to both lower extremities is grossly intact. Exam of both feet reveals pedal pulses intact and brisk cap refill. Patient is able to dorsiflex and wiggle all toes. Deep tendon reflexes of the lower extremities are normal and symmetric. X-rays: 4 views of the left knee obtained in the office today were extensively reviewed. The x-rays reveal severe osteoarthritis with varus deformity. The changes are most severe medially. Impression: Left knee osteoarthritis    Plan: At this time, the patient will be scheduled for a left total knee arthroplasty. All risks including but not limited to blood loss, infection, persistent pain,stiffness, weakness, loosening, deep vein thrombosis, neurovascular injury, and the risks of anesthesia were discussed. The patient understands all risks and benefits of the procedure and agrees to proceed. The patient will see his primary care Maria R Asif MD, for medical clearance. If the patient is on NSAIDS or blood thinners these medications will need to be discontinued one week prior to surgery.

## 2021-01-05 NOTE — PROGRESS NOTES
RAPT  RISK ASSESSMENT and PREDICTION TOOL    Name: Donato Carter  YOB: 1950  Surgeon: Jodi Handy MD         Value Score    1). What is your age group? 50 - 65 years  = 2      66 - 75 years = 1     > 75 years = 0       Your score = 1   2). Gender? Male = 2     Female = 1       Your score = 2   3). How far on average can you walk? Two blocks or more (+/- rest) = 2    (a block is 200 etmnfx=341 ft)  1 - 2 blocks (+/- rest) = 1     Housebound (most of time) = 0       Your score = 1   4). Which gait aid do you use? None = 2    (more often than not) Single-point stick = 1     Crutches/walker = 0       Your score = 2   5). Do you use community supports? None or one per week = 1    (home help, meals on wheels, district nursing) Two or more per week = 0       Your score = 1   6). Will you live with someone who can care for you after your operation? yes = 3     no = 0       Your score = 3    Your Total Score (out of 12) = 10       Key: Destination at discharge from acute care predicted by score. Score < 6  = extended inpatient rehabilitation  Score 6 - 9  = additional intervention to discharge directly home (Rehab in the home)  Score > 9  = directly home      Patient's Preference Prediction Score Agreed destination   open 10 home   Donato Carter  Date: 1/5/21   Patients sister will be taking care of him at home after surgery.

## 2021-01-05 NOTE — LETTER
[facility]: [phone]: [phone]  Surgery Scheduling Form:  DEMOGRAPHICS:                                                                                                              .    Patient Name:  Marivel Barillas    Patient :  1950   Patient SS#:        Patient Phone:  [phone] (home)      Patient Address:  [address] [place] [address],[facility] [address]    Insurance:    Payor/Plan Subscr  Sex Relation Sub. Ins. ID Effective Group Num   1. [place]* 1950 Male Self [id number]                                   [address] [address]     DIAGNOSIS & PROCEDURE:                                                                                            .    Diagnosis:  Osteoarthritis- left knee M17.12    Operation:  Total knee replacement- left knee Robotic Assisted    Location:  [facility]    Surgeon:  Faina Galdamez    SCHEDULING INFORMATION:                                                                                         .    Requested Date:  21 OR Time: 9:55am  Patient Arrival Time: 7:55am     OR Time Required:  120  Minutes         Anesthesia:  General    SA Required:  Yes x 2    Equipment:  Schvey Advanced    Status:  outpatient     PAT Required:  Yes COVID19 test:  21    Latex Allergy:  no Defibrilator or Pacemaker:  no    Isolation Precautions:  no                      Laurent PBeatris Smith MD     21   BILLING INFORMATION:                                                                                                    .                          CPT Code Modifier  Total knee    Prior auth:  [id number]  Name: Marivel Barillas  : 1950  Procedure: Total knee replacement- left         Date of Surgery:21             Date of JET:21    Allergies: Patient has no known allergies.     Ht Readings from Last 1 Encounters:   21 5' 9\" (1.753 m)      Wt Readings from Last 1 Encounters: 01/05/21 229 lb 4.8 oz (104 kg)         TOTAL KNEE REPLACEMENT PHYSICIANS ORDERS   I hereby authorize the pharmacy, under the formulary system to dispense a different brand of drug identical composition and comparable quality unless the brand name I have prescribed is circled on this order sheet  All orders without checkboxes will be processed automatically unless crossed out. Orders with checkboxes MUST be checked in order to be carried out. PRE-OP [email or web address]  [ ] X-ray operative site-standing view  Van ] CBC without differential [X] Albumin  Van ] BMP      [ ] Coag profile  [X] PT/INR   [ ] Sed rate on revisions of total joints only  Van ] Type and screen  Mayela.Millin ] EKG      Darrianin ] UAR     [X] A1C  Darrianin ] Clean catch urine for routine analysis, if positive for nitrites and/or leukocytes, do urine for C & S  Van ] Nasal culture for MRSA  Van ] Physical therapy evaluation and teaching  Van ] Occupational therapy evaluation and teaching  Van ] Inform patient to stop all anti-inflammatory medications including aspirin for 7 days before surgery    DAY OF SURGERY  Van ] Cefazolin: 1 gram IVPB; if patient weighs > 80 kg and serum creatinine <2.5 mg/dl give 2 gram dose within 1 hour of incision. If patient has a minor allergy to Penicillin, still give this. OR  If the pre-op nasal culture for MRSA was positive, repeat nasal swab before surgery and give: Vancomycin 2 gram IVPB, reduce dose of Vancomycin to 500 mg IVPB if PT < 55 kg or serum creatinine > 2 mg/dl (Vancomycin must be administered over 1 hour)& Cefazolin 1 gram IVPB; if patient weighs>80 kg and serum creatinine <2.3 mg/dl give 2 gram dose within 1 hour of incision  OR  If patient has a true severe allergy and underwent allergy testing to Penicillin or Cephalosporins give: Clindamycin 900mg IVPB, then administer 2 more doses post op.     Van ] Apply knee high antiembolic hose and pneumonboots to unoperative leg   D/C after 2 weeks Other order: Mobic 15mg pre-op     Juan.Mavericktrom ] Type and screen    T.O: _____________________________/___________________Date:_______Time:_______   Physician    RN    Physician Signature:               Date/Time:  1/5/2021 10:15 AM

## 2021-01-11 ENCOUNTER — TELEPHONE (OUTPATIENT)
Dept: ORTHOPEDIC SURGERY | Age: 71
End: 2021-01-11

## 2021-01-11 NOTE — TELEPHONE ENCOUNTER
Other Patient is calling and would like a call back regarding hi sx. Patient would like to know if he can have someone come in and wait until after the surgery.  [facility] [phone]

## 2021-01-11 NOTE — TELEPHONE ENCOUNTER
The patient was informed that he can have one person at the hospital at this time but restrictions can change daily.

## 2021-01-13 ENCOUNTER — HOSPITAL ENCOUNTER (OUTPATIENT)
Dept: PHYSICAL THERAPY | Age: 71
Setting detail: THERAPIES SERIES
Discharge: HOME | End: 2021-01-13
Payer: MEDICARE

## 2021-01-13 PROCEDURE — 97161 PT EVAL LOW COMPLEX 20 MIN: CPT | Performed by: CHIROPRACTOR

## 2021-01-13 PROCEDURE — 97530 THERAPEUTIC ACTIVITIES: CPT | Performed by: CHIROPRACTOR

## 2021-01-13 NOTE — PLAN OF CARE
.  [facility], [facility]  [address] [facility], [facility]  Phone: [phone]   Fax:     [phone]                                                       Physical Therapy Certification    Dear Referring Practitioner: Dr. Beni Terrell,    We had the pleasure of evaluating the following patient for physical therapy services at [facility]. A summary of our findings can be found in the initial assessment below. This includes our plan of care. If you have any questions or concerns regarding these findings, please do not hesitate to contact me at the office phone number checked above.   Thank you for the referral.       Physician Signature:_______________________________Date:__________________  By signing above (or electronic signature), therapists plan is approved by physician        Patient: Tasha Kraft   : 1950   MRN: [id number]  Referring Physician: Referring Practitioner: Dr. Beni Terrell      Evaluation Date: 2021      Medical Diagnosis Information:  Diagnosis: L knee OA (PreHab)   Treatment Diagnosis: Knee pain                                         Insurance information: PT Insurance Information: [facility] Medicare Advantage     Precautions/ Contra-indications:  Latex Allergy:  [x]NO      []YES  Preferred Language for Healthcare:   [x]English       []other:    C-SSRS Triggered by Intake questionnaire (Past 2 wk assessment ):   [x] No, Questionnaire did not trigger screening.   [] Yes, Patient intake triggered C-SSRS Screening      [] C-SSRS Screening completed  [] PCP notified via Epic     SUBJECTIVE: Patient stated complaint: L knee pain    Relevant Medical History:Additional Pertinent Hx: Meniere's Disease  Functional Outcome: WOMAC: score = 42    Pain Scale: 410  Easing factors: Rest  Provocative factors: Amb / stairs    Type: [x]Constant   []Intermittent  []Radiating []Localized []other:     Numbness/Tingling: Palpation: Sensation is grossly intact to light touch    Quad:     Functional Mobility/Transfers: Independent    Posture: Good    Bandages/Dressings/Incisions: N/A    Gait: (include devices/WB status) Amb well (slightl antalgic gait) without any assistive device    Dermatomes Normal Abnormal Comments   inguinal area (L1)       anterior mid-thigh (L2)      distal ant thigh/med knee (L3)      medial lower leg and foot (L4)      lateral lower leg and foot (L5)      posterior calf (S1)      medial calcaneus (S2)          Myotomes Normal Abnormal Comments   Hip flexion (L1-L2)      Knee extension (L2-L4)      Dorsiflexion (L4-L5)      Great Toe Ext (L5)      Ankle Eversion (S1-S2)      Ankle PF(S1-S2)          Reflexes Normal Abnormal Comments   S1-2 Seated achilles      S1-2 Prone knee bend      L3-4 Patellar tendon      Clonus      Babinski           PROM AROM    L R L R   Hip Flexion WNL WNL     Knee Flexion 130 130     Knee Extension 5 0         Strength (0-5) Left Right   Hip Flexion - supine     Hip Flexion - seated     Hip Abduction - sidelyling # #   Hip Adduction     Hip Extension     Quads # #   Hamstrings          Flexibility     Hamstrings (90/90)     ITB (Nevin)     Quads (Ely's)     Hip Flexor Tanvir Locket)          Girth     Mid patella     Suprapatellar         Joint mobility: patellofemoral    [x]Normal    []Hypo   []Hyper         Functional Testing  Sx Date 2/8/21 Prehab Date 1/13/21 Post-op Re-Eval Date  4 week F/U date  8 week F/U date  D/C Date       TUG (sec) 10 sec       30 second sit to stand (reps) 8       6 minute walk (m) 503 M          Balance:    Narrowed JOSE (sec) 10 sec       Semi Tandem (sec)   10 sec              Tandem (sec)   10 sec               SLS (sec) 4 sec            Knee AROM L R L R L R L R L R   Flexion             Extension                Knee Ext: L R L R L R L R L R   MMT (of 5)             Knee Ext (#)                Hip Abd:  L R L R L R L R L R   MMT (of 5)             Hip Abd (#)                LEFS (raw)                               [x] Patient history, allergies, meds reviewed. Medical chart reviewed. See intake form. Review Of Systems (ROS):  [x]Performed Review of systems (Integumentary, CardioPulmonary, Neurological) by intake and observation. Intake form has been scanned into medical record. Patient has been instructed to contact their primary care physician regarding ROS issues if not already being addressed at this time. Co-morbidities/Complexities (which will affect course of rehabilitation):   []None           Arthritic conditions   []Rheumatoid arthritis (M05.9)  []Osteoarthritis (M19.91)   Cardiovascular conditions   []Hypertension (I10)  []Hyperlipidemia (E78.5)  []Angina pectoris (I20)  []Atherosclerosis (I70)   Musculoskeletal conditions   []Disc pathology   []Congenital spine pathologies   []Prior surgical intervention  []Osteoporosis (M81.8)  []Osteopenia (M85.8)   Endocrine conditions   []Hypothyroid (E03.9)  []Hyperthyroid Gastrointestinal conditions   []Constipation (P87.93)   Metabolic conditions   []Morbid obesity (E66.01)  []Diabetes type 1(E10.65) or 2 (E11.65)   []Neuropathy (G60.9)     Pulmonary conditions   []Asthma (J45)  []Coughing   []COPD (J44.9)   Psychological Disorders  []Anxiety (F41.9)  []Depression (F32.9)   []Other:   [x]Other:     Meniere's Disease     Barriers to/and or personal factors that will affect rehab potential:              []Age  []Sex    []Smoker              []Motivation/Lack of Motivation                        [x]Co-Morbidities              []Cognitive Function, education/learning barriers              []Environmental, home barriers              []profession/work barriers  []past PT/medical experience  []other:  Justification:     Falls Risk Assessment (30 days):   [x] Falls Risk assessed and no intervention required.   [] Falls Risk assessed and Patient requires intervention due to being higher risk   TUG score (>12s at risk):     [] Falls education provided, including         ASSESSMENT:   Functional Impairments:     []Noted lumbar/proximal hip/LE joint hypomobility   []Decreased LE functional ROM   []Decreased core/proximal hip strength and neuromuscular control   []Decreased LE functional strength   []Reduced balance/proprioceptive control   []other:      Functional Activity Limitations (from functional questionnaire and intake)   []Reduced ability to tolerate prolonged functional positions   []Reduced ability or difficulty with changes of positions or transfers between positions   []Reduced ability to maintain good posture and demonstrate good body mechanics with sitting, bending, and lifting   [x]Reduced ability to sleep   [] Reduced ability or tolerance with driving and/or computer work   []Reduced ability to perform lifting, carrying tasks   [x]Reduced ability to squat   []Reduced ability to forward bend   [x]Reduced ability to ambulate prolonged functional periods/distances/surfaces   [x]Reduced ability to ascend/descend stairs   [x]Reduced ability to run, hop, cut or jump   []other:    Participation Restrictions   []Reduced participation in self care activities   []Reduced participation in home management activities   []Reduced participation in work activities   [x]Reduced participation in social activities. []Reduced participation in sport/recreation activities. Classification :    []Signs/symptoms consistent with post-surgical status including decreased ROM, strength and function.    []Signs/symptoms consistent with joint sprain/strain   []Signs/symptoms consistent with patella-femoral syndrome   []Signs/symptoms consistent with knee OA/hip OA   []Signs/symptoms consistent with internal derangement of knee/Hip   []Signs/symptoms consistent with functional hip weakness/NMR control      []Signs/symptoms consistent with tendinitis/tendinosis    []signs/symptoms consistent with pathology which may benefit from Dry needling      []other:      Prognosis/Rehab Potential:      []Excellent   []Good    []Fair   []Poor    Tolerance of evaluation/treatment:    []Excellent   [x]Good    []Fair   []Poor    Physical Therapy Evaluation Complexity Justification  [x] A history of present problem with:  [] no personal factors and/or comorbidities that impact the plan of care;  []1-2 personal factors and/or comorbidities that impact the plan of care  []3 personal factors and/or comorbidities that impact the plan of care  [x] An examination of body systems using standardized tests and measures addressing any of the following: body structures and functions (impairments), activity limitations, and/or participation restrictions;:  [x] a total of 1-2 or more elements   [] a total of 3 or more elements   [] a total of 4 or more elements   [x] A clinical presentation with:  [x] stable and/or uncomplicated characteristics   [] evolving clinical presentation with changing characteristics  [] unstable and unpredictable characteristics;   [x] Clinical decision making of [x] low , [] moderate, [] high complexity using standardized patient assessment instrument and/or measurable assessment of functional outcome. [x] EVAL (LOW) 75986 (typically 30 minutes face-to-face)  [] EVAL (MOD) 92418 (typically 30 minutes face-to-face)  [] EVAL (HIGH) 29776 (typically 45 minutes face-to-face)  [] RE-EVAL     PLAN:   Frequency/Duration:  1 days per week for 1 Weeks:  Interventions:  [x]  Therapeutic exercise including: strength training, ROM, for Lower extremity and core   [x]  NMR activation and proprioception for LE, Glutes and Core   [x]  Manual therapy as indicated for LE, Hip and spine to include: Dry Needling/IASTM, STM, PROM, Gr I-IV mobilizations, manipulation.    [x] Modalities as needed that may include: thermal agents, E-stim, Biofeedback, US, iontophoresis as indicated  [x] Patient education on joint protection, postural re-education, activity modification, progression of HEP. [x] Aquatic exercise including: strength training, ROM, and balance for lower extremity and core     HEP instruction: written HEP instructions provided and discussed    Patient education:  Patient was thoroughly educated on this date regarding prehabilitation goals, importance of PT sessions in improving overall ROM, strength and stability prior to surgery, and how prehabilitation will facilitate improved post-operative outcomes. The patient was educated on and instructed in HEP as listed. The patient was given a detailed handout for exercises to initiate in the hospital post-operatively as well as at home. The discharge plan from the hospital was reviewed with the patient; specifically, to reduce length of hospital stay and to minimize time before reinitiating outpatient physical therapy after surgery. Education regarding early mobility post-operatively in the hospital and emphasis on working with both physical therapy and nursing staff to achieve ambulation goal of 150 feet was provided. The patient was highly encouraged to attend joint class in hospital prior to surgery for further instructions on pre and post-surgical care. Also, education regarding goals and expectations was provided; specifically, knee flexion range of motion greater than or equal to 90 degrees and 0 degrees knee extension to promote improved gait mechanics and ambulation. The patient was encouraged to utilize ice/cold pack after surgery to address pain, minimize swelling as often as possible. It is in my medical opinion that this patient is clear from all physical barriers prior to consideration for surgery, activity modifications prior to and post operatively have been discussed with this patient as well as discharge planning and is cleared for surgery from physical therapy perspective.        GOALS:  Patient stated goal:   [] Progressing: [] Met: [] Not Met: [] Adjusted    Therapist goals for Patient: Short Term Goals: To be achieved in: 1 visit  1. Independent in HEP and progression per patient tolerance, in order to prevent re-injury. [] Progressing: [x] Met: [] Not Met: [] Adjusted  2. All patient questions regarding expectations for rehab following upcoming surgery are answered.    [] Progressing: [x] Met: [] Not Met: [] Adjusted    Long Term Goals: long term goals to be determined at re-eval following upcoming surgery    Electronically signed by:  Casper Davis II#[id number]

## 2021-01-13 NOTE — FLOWSHEET NOTE
[facility], [facility]  [address] [facility] [place], [facility]  Phone: [phone]   Fax:     [phone]                                                      Physical Therapy Treatment Note/ Progress Report:   Date:  2021    Patient Name:  Crystal Smith    :  1950  MRN: [id number]    Pertinent Medical History:Additional Pertinent Hx: Meniere's Disease    Medical/Treatment Diagnosis Information:  · Diagnosis: L knee OA (PreHab)  · Treatment Diagnosis: Knee pain    Insurance/Certification information:   [facility] Medicare Advantage  Physician Information:   Dr. Gilbert Safe of care signed (Y/N): Inbox    Date of Patient follow up with Physician:      Progress Report: []  Yes  [x]  No     Date Range for reporting period:  Beginnin2021  Ending:      Progress report due (10 Rx/or 30 days whichever is less):     Recertification due (POC duration/ or 90 days whichever is less):      Visit # POC/Insurance Allowable Auth Needed     []Yes   [x]No     Latex Allergy:  [x]NO      []YES  Preferred Language for Healthcare:   [x]English       []Other:    Functional Scale:       Date assessed: at eval  Test: WOMAC  Score: 42    Pain level:  4/10     History of Injury:  Progressive increased pain over the last year    SUBJECTIVE:  See eval    OBJECTIVE: see eval   Test measurements:    Functional testing Prehab        Date   21 Re eval   post op Date  4 week f/u   Date    8 week f/u  Date    D/C  Date           TUG 10\"       30 second sit to stand 8       6 minute walk 503 M               Balance        Narrow JOSE 10 sec       Semi tandem 10 sec       Tandem  10 sec       SLS 4 sec               Knee AROM 5-130       Knee Extension MMT R/L L= 5/5  R=5/5       Hip aBduction MMT R/L L=5/5  R=5/5       WOMAC 42         RESTRICTIONS/PRECAUTIONS:     Exercises/Interventions:     Therapeutic Ex (75180)   Min: Reps/Resistance Notes/CUES   [place]in Exs:     Ankle Pumps     Knee extension stretch in supine     SLR flexion     Knee flexion AAROM          Go Exs:      Ankle pumps     SLR flexion      Calf towel stretch     Long sit HS stretch     Knee flexion AAROM      Seated LAQ     Mini squats          NMR re-education (05694)  Min:     Lorean Roots exs     Quad Set     Glute set          Go exs     Quad/Glute set          Therapeutic Activity (90479)  Min:          Gait Training (74024)  Min:           Manual Intervention (81053) Min:                Modalities  Min:            Other Therapeutic Activities:   Patient was thoroughly educated on this date regarding prehabilitation goals, importance of PT sessions in improving overall ROM, strength and stability prior to surgery, and how prehabilitation will facilitate improved post-operative outcomes. The patient was educated on and instructed in HEP as listed. The patient was given a detailed handout for exercises to initiate in the hospital post-operatively as well as at home. The discharge plan from the hospital was reviewed with the patient; specifically, to reduce length of hospital stay and to minimize time before reinitiating outpatient physical therapy after surgery. Education regarding early mobility post-operatively in the hospital and emphasis on working with both physical therapy and nursing staff to achieve ambulation goal was provided. The patient was highly encouraged to attend joint class in hospital prior to surgery for further instructions on pre and post-surgical care. Also, education regarding goals and expectations was provided; specifically, knee flexion range of motion greater than or equal to 90 degrees and 0 degrees knee extension to promote improved gait mechanics and ambulation. The patient was encouraged to utilize ice/cold pack after surgery to address pain, minimize swelling as often as possible.  It is in my medical opinion that this patient is clear from all physical barriers prior to consideration for surgery, activity modifications prior to and post operatively have been discussed with this patient as well as discharge planning and is cleared for surgery from physical therapy perspective. Home Exercise Program:  Patient instructed in the above exercises for HEP. Patient verbalized/demonstrated understanding and was issued written handout. Therapeutic Exercise and NMR EXR  [] (61572) Provided verbal/tactile cueing for activities related to strengthening, flexibility, endurance, ROM for improvements in LE, proximal hip, and core control with self care, mobility, lifting, ambulation.  [] (28655) Provided verbal/tactile cueing for activities related to improving balance, coordination, kinesthetic sense, posture, motor skill, proprioception  to assist with LE, proximal hip, and core control in self care, mobility, lifting, ambulation and eccentric single leg control.  [address] and Therapeutic Activities:    [x] (07275 or 58003) Provided verbal/tactile cueing for activities related to improving balance, coordination, kinesthetic sense, posture, motor skill, proprioception and motor activation to allow for proper function of core, proximal hip and LE with self care and ADLs and functional mobility.   [] (50467) Gait Re-education- Provided training and instruction to the patient for proper LE, core and proximal hip recruitment and positioning and eccentric body weight control with ambulation re-education including up and down stairs     Gait Training:  [] (54690) Provided training and instruction to the patient for proper postural muscle recruitment and positioning with ambulation re-education     Home Exercise Program:    [x] (97022) Reviewed/Progressed HEP activities related to strengthening, flexibility, endurance, ROM of core, proximal hip and LE for functional self-care, mobility, lifting and ambulation/stair navigation   [] (28118)Reviewed/Progressed HEP activities related to improving balance, coordination, kinesthetic sense, posture, motor skill, proprioception of core, proximal hip and LE for self care, mobility, lifting, and ambulation/stair navigation      Manual Treatments:  PROM / STM / Oscillations-Mobs:  G-I, II, III, IV (PA's, Inf., Post.)  [] (53378) Provided manual therapy to mobilize LE, proximal hip and/or LS spine soft tissue/joints for the purpose of modulating pain, promoting relaxation,  increasing ROM, reducing/eliminating soft tissue swelling/inflammation/restriction, improving soft tissue extensibility and allowing for proper ROM for normal function with self care, mobility, lifting and ambulation. Charges:  Timed Code Treatment Minutes: 15   Total Treatment Minutes: 30      [x] EVAL (LOW) 79756 (typically 20 minutes face-to-face)  [] EVAL (MOD) 06813 (typically 30 minutes face-to-face)  [] EVAL (HIGH) 05496 (typically 45 minutes face-to-face)  [] RE-EVAL     [] JG(81575) x     [] Dry needle 1 or 2 Muscles (81849)  [] NMR (52024) x     [] Dry needle 3+ Muscles (38692)  [] Manual (17049) x     [] Ultrasound (10945) x  [x] TA (96217) x     [] Mech Traction (69591)  [] ES(attended) (59043)     [] ES (un) (64081):   [] Vasopump (67185) [] Ionto (61583)   [] Gait (31709)  [] Other:        ASSESSMENT:  See eval    Treatment/Activity Tolerance:  [x] Patient tolerated treatment well [] Patient limited by fatique  [] Patient limited by pain  [] Patient limited by other medical complications  [] Other:     Overall Progression Towards Functional goals/ Treatment Progress Update:  [] Patient is progressing as expected towards functional goals listed. [] Progression is slowed due to complexities/Impairments listed. [] Progression has been slowed due to co-morbidities.   [x] Plan just implemented, too soon to assess goals progression <30days   [] Goals require adjustment due to lack of progress  [] Patient is not progressing as expected and requires additional follow up with physician  [] Other    Prognosis for POC: [x] Good [] Fair  [] Poor    Patient requires continued skilled intervention: [] Yes  [x] No        PLAN:Patient seen for 1 visit for HEP. Patient is a good candidate for TKR surgery and would benefit from outpatient rehab following surgery. [] Continue per plan of care [] Alter current plan (see comments)  [] Plan of care initiated [] Hold pending MD visit [x] Discharge    Electronically signed by: Ricardo ROMERO#[id number]    Note: If patient does not return for scheduled/recommended follow up visits, this note will serve as a discharge from care along with the most recent update on progress.

## 2021-01-14 ENCOUNTER — HOSPITAL ENCOUNTER (OUTPATIENT)
Dept: GENERAL RADIOLOGY | Age: 71
Discharge: HOME | End: 2021-01-14
Payer: MEDICARE

## 2021-01-14 DIAGNOSIS — M17.12 PRIMARY OSTEOARTHRITIS OF LEFT KNEE: ICD-10-CM

## 2021-01-14 PROCEDURE — 77073 BONE LENGTH STUDIES: CPT

## 2021-01-15 ENCOUNTER — TELEPHONE (OUTPATIENT)
Dept: ORTHOPEDIC SURGERY | Age: 71
End: 2021-01-15

## 2021-01-18 ENCOUNTER — PREP FOR PROCEDURE (OUTPATIENT)
Dept: ORTHOPEDICS UNIT | Age: 71
End: 2021-01-18

## 2021-01-18 RX ORDER — MELOXICAM 7.5 MG/1
15 TABLET ORAL ONCE
Status: CANCELLED | OUTPATIENT
Start: 2021-01-18 | End: 2021-01-18

## 2021-01-25 ENCOUNTER — HOSPITAL ENCOUNTER (OUTPATIENT)
Dept: PREADMISSION TESTING | Age: 71
Discharge: HOME | End: 2021-01-29
Payer: MEDICARE

## 2021-01-25 DIAGNOSIS — Z01.818 PREOP TESTING: ICD-10-CM

## 2021-01-25 LAB
ABO/RH: NORMAL
ALBUMIN SERPL-MCNC: 4.9 G/DL (ref 3.4–5)
ANION GAP SERPL CALCULATED.3IONS-SCNC: 11 MMOL/L (ref 3–16)
ANTIBODY SCREEN: NORMAL
BASOPHILS ABSOLUTE: 0 K/UL (ref 0–0.2)
BASOPHILS RELATIVE PERCENT: 0.6 %
BILIRUBIN URINE: NEGATIVE
BLOOD, URINE: NEGATIVE
BUN BLDV-MCNC: 16 MG/DL (ref 7–20)
CALCIUM SERPL-MCNC: 10.1 MG/DL (ref 8.3–10.6)
CHLORIDE BLD-SCNC: 99 MMOL/L (ref 99–110)
CLARITY: CLEAR
CO2: 24 MMOL/L (ref 21–32)
COLOR: YELLOW
CREAT SERPL-MCNC: 0.9 MG/DL (ref 0.8–1.3)
EKG ATRIAL RATE: 71 BPM
EKG DIAGNOSIS: NORMAL
EKG P AXIS: 55 DEGREES
EKG P-R INTERVAL: 168 MS
EKG Q-T INTERVAL: 380 MS
EKG QRS DURATION: 110 MS
EKG QTC CALCULATION (BAZETT): 412 MS
EKG R AXIS: 97 DEGREES
EKG T AXIS: 52 DEGREES
EKG VENTRICULAR RATE: 71 BPM
EOSINOPHILS ABSOLUTE: 0.2 K/UL (ref 0–0.6)
EOSINOPHILS RELATIVE PERCENT: 3.8 %
GFR AFRICAN AMERICAN: >60
GFR NON-AFRICAN AMERICAN: >60
GLUCOSE BLD-MCNC: 120 MG/DL (ref 70–99)
GLUCOSE URINE: NEGATIVE MG/DL
HCT VFR BLD CALC: 46.4 % (ref 40.5–52.5)
HEMOGLOBIN: 16 G/DL (ref 13.5–17.5)
INR BLD: 1.02 (ref 0.86–1.14)
KETONES, URINE: NEGATIVE MG/DL
LEUKOCYTE ESTERASE, URINE: NEGATIVE
LYMPHOCYTES ABSOLUTE: 2.4 K/UL (ref 1–5.1)
LYMPHOCYTES RELATIVE PERCENT: 46.7 %
MCH RBC QN AUTO: 32.5 PG (ref 26–34)
MCHC RBC AUTO-ENTMCNC: 34.5 G/DL (ref 31–36)
MCV RBC AUTO: 94.3 FL (ref 80–100)
MICROSCOPIC EXAMINATION: NORMAL
MONOCYTES ABSOLUTE: 0.4 K/UL (ref 0–1.3)
MONOCYTES RELATIVE PERCENT: 8.5 %
NEUTROPHILS ABSOLUTE: 2.1 K/UL (ref 1.7–7.7)
NEUTROPHILS RELATIVE PERCENT: 40.4 %
NITRITE, URINE: NEGATIVE
PDW BLD-RTO: 12.7 % (ref 12.4–15.4)
PH UA: 5.5 (ref 5–8)
PLATELET # BLD: 194 K/UL (ref 135–450)
PMV BLD AUTO: 6.3 FL (ref 5–10.5)
POTASSIUM SERPL-SCNC: 3.9 MMOL/L (ref 3.5–5.1)
PROTEIN UA: NEGATIVE MG/DL
PROTHROMBIN TIME: 11.8 SEC (ref 10–13.2)
RBC # BLD: 4.92 M/UL (ref 4.2–5.9)
SODIUM BLD-SCNC: 134 MMOL/L (ref 136–145)
SPECIFIC GRAVITY UA: 1.01 (ref 1–1.03)
URINE REFLEX TO CULTURE: NORMAL
URINE TYPE: NORMAL
UROBILINOGEN, URINE: 0.2 E.U./DL
WBC # BLD: 5.2 K/UL (ref 4–11)

## 2021-01-25 PROCEDURE — 81003 URINALYSIS AUTO W/O SCOPE: CPT

## 2021-01-25 PROCEDURE — 82040 ASSAY OF SERUM ALBUMIN: CPT

## 2021-01-25 PROCEDURE — 93005 ELECTROCARDIOGRAM TRACING: CPT

## 2021-01-25 PROCEDURE — 80048 BASIC METABOLIC PNL TOTAL CA: CPT

## 2021-01-25 PROCEDURE — 86850 RBC ANTIBODY SCREEN: CPT

## 2021-01-25 PROCEDURE — 93010 ELECTROCARDIOGRAM REPORT: CPT | Performed by: INTERNAL MEDICINE

## 2021-01-25 PROCEDURE — 83036 HEMOGLOBIN GLYCOSYLATED A1C: CPT

## 2021-01-25 PROCEDURE — 85025 COMPLETE CBC W/AUTO DIFF WBC: CPT

## 2021-01-25 PROCEDURE — 86901 BLOOD TYPING SEROLOGIC RH(D): CPT

## 2021-01-25 PROCEDURE — 86900 BLOOD TYPING SEROLOGIC ABO: CPT

## 2021-01-25 PROCEDURE — 85610 PROTHROMBIN TIME: CPT

## 2021-01-25 PROCEDURE — 87081 CULTURE SCREEN ONLY: CPT

## 2021-01-25 NOTE — PROGRESS NOTES
Patient attended JET class on 1/25/2021. Patient verified surgery for Total Knee replacement and received patient information and educational JET folder including education handouts on hand hygiene and preventing constipation. Interviews completed by PT, OT, and PAT. Labs and Tests completed as ordered/necessary. Patient given handout instructions on Pre-operative Showering techniques and the use of anti-septic 3 days before surgery. Anti-septic bottle given to patient to take home. Patient states no further questions or concerns. DOS: 2/8/21  Dr Arnold Owens: home with sister stanislaw and [facility]  ;if applicable. Per Patient Will see/Saw PCP on 2/4/21.       Electronically signed by Lajuan Simmonds, RN on 1/25/2021 at 2:43 PM

## 2021-01-26 LAB
ESTIMATED AVERAGE GLUCOSE: 96.8 MG/DL
HBA1C MFR BLD: 5 %

## 2021-01-28 LAB — MRSA CULTURE ONLY: NORMAL

## 2021-02-02 ENCOUNTER — OFFICE VISIT (OUTPATIENT)
Dept: PRIMARY CARE CLINIC | Age: 71
End: 2021-02-02
Payer: MEDICARE

## 2021-02-02 DIAGNOSIS — Z01.812 PRE-PROCEDURAL LABORATORY EXAMINATIONS: Primary | ICD-10-CM

## 2021-02-02 PROCEDURE — 99211 OFF/OP EST MAY X REQ PHY/QHP: CPT | Performed by: NURSE PRACTITIONER

## 2021-02-02 PROCEDURE — G8417 CALC BMI ABV UP PARAM F/U: HCPCS | Performed by: NURSE PRACTITIONER

## 2021-02-02 PROCEDURE — G8428 CUR MEDS NOT DOCUMENT: HCPCS | Performed by: NURSE PRACTITIONER

## 2021-02-02 NOTE — PROGRESS NOTES
Patient presented to [facility] drive up clinic for preop testing. Patient was swabbed and given information advising them to remain isolated until procedure date.

## 2021-02-03 LAB — SARS-COV-2: NOT DETECTED

## 2021-02-03 NOTE — PROGRESS NOTES
C-Difficile admission screening and protocol:     * Admitted with diarrhea? n     *Prior history of C-Diff. In last 3 months?n   *Antibiotic use in the past 6-8 weeks?n     *Prior hospitalization or nursing home in the last month?n        [address] time_8am_____        Surgery time____________    Take the following medications with a sip of water:    Do not eat or drink anything after 12:00 midnight prior to your surgery. This includes water chewing gum, mints and ice chips. You may brush your teeth and gargle the morning of your surgery, but do not swallow the water     Please see your family doctor/pediatrician for a history and physical and/or concerning medications. Bring any test results/reports from your physicians office. If you are under the care of a heart doctor or specialist doctor, please be aware that you may be asked to them for clearance    You may be asked to stop blood thinners such as Coumadin, Plavix, Fragmin, Lovenox, etc., or any anti-inflammatories such as:  Aspirin, Ibuprofen, Advil, Naproxen prior to your surgery. We also ask that you stop any OTC medications such as fish oil, vitamin E, glucosamine, garlic, Multivitamins, COQ 10, etc.    We ask that you do not smoke 24 hours prior to surgery  We ask that you do not  drink any alcoholic beverages 24 hours prior to surgery     You must make arrangements for a responsible adult to take you home after your surgery. For your safety you will not be allowed to leave alone or drive yourself home. Your surgery will be cancelled if you do not have a ride home. Also for your safety, it is strongly suggested that someone stay with you the first 24 hours after your surgery. A parent or legal guardian must accompany a child scheduled for surgery and plan to stay at the hospital until the child is discharged. Please do not bring other children with you. For your comfort, please wear simple loose fitting clothing to the hospital.  Please do not bring valuables. Do not wear any make-up or nail polish on your fingers or toes      For your safety, please do not wear any jewelry or body piercing's on the day of surgery. All jewelry must be removed. If you have dentures, they will be removed before going to operating room. For your convenience, we will provide you with a container. If you wear contact lenses or glasses, they will be removed, please bring a case for them. If you have a living will and a durable power of  for healthcare, please bring in a copy. As part of our patient safety program to minimize surgical site infections, we ask you to do the following:    · Please notify your surgeon if you develop any illness between         now and the  day of your surgery. · This includes a cough, cold, fever, sore throat, nausea,         or vomiting, and diarrhea, etc.  ·  Please notify your surgeon if you experience dizziness, shortness         of breath or blurred vision between now and the time of your surgery. Do not shave your operative site 96 hours prior to surgery. For face and neck surgery, men may use an electric razor 48 hours   prior to surgery. You may shower the night before surgery or the morning of   your surgery with an antibacterial soap. You will need to bring a photo ID and insurance card    [facility] has an onsite pharmacy, would you like to utilize our pharmacy     If you will be staying overnight and use a C-pap machine, please bring   your C-pap to hospital     Our goal is to provide you with excellent care, therefore, visitors will be limited to two(2) in the room at a time so that we may focus on providing this care for you. Please contact pre-admission testing if you have any further questions.                  [facility] phone number:  [phone] PAT fax number:  [phone] Please note these are generalized instructions for all surgical cases, you may be provided with more specific instructions according to your surgery          . Preoperative Screening for Elective Surgery/Invasive Procedures While COVID-19 present in the community    ? Have you tested positive or have been told to self-isolate for COVID-19 like symptoms within the past 28 days? n  ? Do you currently have any of the following symptoms? n  o Fever >100.0 F or 99.9 F in immunocompromised patients? n  o New onset cough, shortness of breath or difficulty breathing?n  o New onset sore throat, myalgia (muscle aches and pains), headache, loss of taste/smell or diarrhea? ? Have you had a potential exposure to COVID-19 within the past 14 days by:  o Close contact with a confirmed case? o Close contact with a healthcare worker,  or essential infrastructure worker (grocery store, [employer], gas station, public utilities or transportation)? yes  o Do you reside in a congregate setting such as; skilled nursing facility, adult home, correctional facility, homeless shelter or other institutional setting?  o Have you had recent travel to a known COVID-19 hotspot? Resides in [address] [place] [address] if the patient has a positive screen by answering yes to one or more of the above questions. Patients who test positive or screen positive prior to surgery or on the day of surgery should be evaluated in conjunction with the surgeon/proceduralist/anesthesiologist to determine the urgency of the procedure.

## 2021-02-04 ENCOUNTER — TELEPHONE (OUTPATIENT)
Dept: ORTHOPEDIC SURGERY | Age: 71
End: 2021-02-04

## 2021-02-04 NOTE — PROGRESS NOTES
Spoke to pt he states he went to [address] [phone]   today @1300. H/p in [facility] but does not state pt medically cleared for surgery on 2/8. We will need to call and clarify this.     PAT interview complete, chart turned in for surgery

## 2021-02-04 NOTE — DISCHARGE INSTR - COC
[facility]) Continuity of Care Form    Patient Name:  Marivel Barillas  : 1950    MRN:  [id number]    Admit date:  (Not on file)  Discharge date:  2021    Code Status Order: No Order  Advance Directives: Yes    Admitting Physician: Derrick Lopez MD  PCP: Tana Sparks MD    Discharging Nurse: [facility] Unit/Room#: No information available for this encounter. Discharging Unit Phone Number: [phone]    Emergency Contact:        Past Surgical History:  Past Surgical History:   Procedure Laterality Date    CATARACT REMOVAL WITH IMPLANT Bilateral     COLONOSCOPY      EVERY 5 YEARS    COLONOSCOPY N/A 2019    COLONOSCOPY WITH BIOPSY performed by Dulce Tamez MD at [address] [place] [address] Left     bilateral cataract    FRACTURE SURGERY      HAND FUSION Bilateral     HERNIA REPAIR Bilateral     X4-2 ON EACH SIDE    KNEE CARTILAGE SURGERY      left knee    ORIF PROXIMAL ULNA FRACTURE Left 2019    OPEN REDUCTION AND INTERNAL FIXATION LEFT ULNA SHAFT FRACTURE WITH MINI C-ARM performed by Gwen Olguin MD at [place] [address] Left     SHUNT PUT IN LEFT EAR FOR MENIERE'S DISEASE    TONSILLECTOMY      UPPP UVULOPALATOPHARYGOPLASTY      FOR SLEEP APNEA       Immunization History: There is no immunization history on file for this patient. Active Problems:  Active Problems:    * No active hospital problems. *  Resolved Problems:    * No resolved hospital problems.  *      Isolation/Infection:       Nurse Assessment:  Last Vital Signs:Ht 5' 9\" (1.753 m)   Wt 229 lb (103.9 kg)   BMI 33.82 kg/m²   Last documented pain score (0-10 scale):    Last Weight:   Wt Readings from Last 1 Encounters:   21 229 lb 4.8 oz (104 kg)     Mental Status:  oriented and alert     IV Access:  - None    Nursing Mobility/ADLs:  Walking   Assisted  Transfer  Assisted  Bathing  Assisted  Dressing  Assisted  Toileting  Assisted Feeding  Independent  Med Admin  Independent  Med Delivery   whole    Wound Care Documentation and Therapy:  Keep glued Prineo dressing clean and intact. DO NOT remove. This is waterproof for showering. Please do not use lotion on dressing. The separate lower tan dressing can be removed in 2 days and no dressing is needed on the small wound. Removal of Prineo dressing will be discussed at postop visit in 2 weeks at office. Elimination:  Urinary Catheter: None   Colostomy/Ileostomy: No  Continence:   · Bowel: Yes  · Bladder: Yes  Date of Last BM: ***    Intake/Output Summary (Last 24 hours)   No intake or output data in the 24 hours ending 02/04/21 1314  Safety Concerns: At Risk for Falls    Impairments/Disabilities:      Vision    Nutrition Therapy:  Current Nutrition Therapy: No diet orders on file  Routes of Feeding: Oral  Liquids: No Restrictions  Daily Fluid Restriction: no  Last Modified Barium Swallow with Video (Video Swallowing Test): not done    Treatments at the Time of Hospital Discharge:   Respiratory Treatments:   Oxygen Therapy:  is not on home oxygen therapy. Ventilator:    - No ventilator support    Lab orders for discharge:        Rehab Therapies: Physical Therapy, Occupational Therapy and nursing care. See pt 4-5 times a week for the first week then 3 times a week thereafter. Weight Bearing Status/Restrictions: No weight bearing restirctions  Other Medical Equipment (for information only, NOT a DME order):  Rolling walker  Other Treatments: Anticoagulation medications must be taken as ordered, Bilateral knee high NAKUL hose for 6 weeks after surgery. Please review application of NAKUL hose with pt.      Patient's personal belongings (please select all that are sent with patient):  Glasses, Dentures partials    RN SIGNATURE:  Electronically signed by Maryam Barlow RN on 2/8/21 at 4:43 PM EST    PHYSICIAN SECTION    Prognosis: Good    Condition at Discharge: Stable Rehab Potential (if transferring to Rehab): Good    Physician Certification: I certify the above orders, information, and transfer of Crystal Smith is necessary for the continuing treatment of the diagnosis listed and that he requires [address] for less 30 days. Update Admission H&P: No change in H&P    PHYSICIAN SIGNATURE:  Electronically signed by LAINEY Gatica CNP on 2/8/21 at 1:14 PM EST/ Dr Zeb Collier Status Date: ***    isinger Readmission Risk Assessment Score:    Discharging to Facility/ Agency   Name:  [facility] care    Address: [address] [place] [address]., [address] [place] [address]., [place] [address]  Phone: [phone]  Fax: [phone]    ·     / signature: {Esignature:[id number]:::0}            DISCHARGE INSTRUCTIONS FOR TOTAL JOINT REPLACEMENT  Activity:  ? Elevate your leg if swelling occurs in your ankle. Use elastic wraps/hose until swelling decreases. ? Continue the exercise program as prescribed by physical therapists. ? Take frequent walks. ? Use walker, crutches, or cane with weight bearing instructions as indicated by the physical therapists. ? Take rest periods often. Elevate leg during rest period. Hip Replacement Only: Follow these instructions for a minimum of 3 months or until instructed by Dr Saray Landry. ? Avoid sitting in low chairs or toilets without raised seats. ? Keep knees apart. Sleep with a pillow between your legs. ? Do not cross your legs, especially when putting on shoes and socks. ? WOUND CARE:Do not scrub wound. Pat it dry with a soft towel. ? Dont apply any lotions or creams to your wound. ? Check the incision every day for redness, swelling, or increase in drainage. Diet:  ? You can resume your normal diet. There are no limits on your diet due to your surgery.

## 2021-02-04 NOTE — TELEPHONE ENCOUNTER
Erma Arndt [phone] has questions about patients meds, she is trying to clear patient for surgery

## 2021-02-05 ENCOUNTER — ANESTHESIA EVENT (OUTPATIENT)
Dept: OPERATING ROOM | Age: 71
End: 2021-02-05
Payer: MEDICARE

## 2021-02-08 ENCOUNTER — APPOINTMENT (OUTPATIENT)
Dept: GENERAL RADIOLOGY | Age: 71
End: 2021-02-08
Attending: ORTHOPAEDIC SURGERY
Payer: MEDICARE

## 2021-02-08 ENCOUNTER — ANESTHESIA (OUTPATIENT)
Dept: OPERATING ROOM | Age: 71
End: 2021-02-08
Payer: MEDICARE

## 2021-02-08 ENCOUNTER — HOSPITAL ENCOUNTER (OUTPATIENT)
Age: 71
Discharge: HOME | End: 2021-02-09
Attending: ORTHOPAEDIC SURGERY | Admitting: ORTHOPAEDIC SURGERY
Payer: MEDICARE

## 2021-02-08 VITALS
RESPIRATION RATE: 12 BRPM | OXYGEN SATURATION: 98 % | SYSTOLIC BLOOD PRESSURE: 127 MMHG | DIASTOLIC BLOOD PRESSURE: 74 MMHG | TEMPERATURE: 98.6 F

## 2021-02-08 DIAGNOSIS — M17.12 OSTEOARTHRITIS OF LEFT KNEE, UNSPECIFIED OSTEOARTHRITIS TYPE: ICD-10-CM

## 2021-02-08 DIAGNOSIS — M17.12 PRIMARY OSTEOARTHRITIS OF LEFT KNEE: Primary | ICD-10-CM

## 2021-02-08 LAB
ABO/RH: NORMAL
ANTIBODY SCREEN: NORMAL
GLUCOSE BLD-MCNC: 140 MG/DL (ref 70–99)
GLUCOSE BLD-MCNC: 191 MG/DL (ref 70–99)
GLUCOSE BLD-MCNC: 212 MG/DL (ref 70–99)
PERFORMED ON: ABNORMAL

## 2021-02-08 PROCEDURE — 36415 COLL VENOUS BLD VENIPUNCTURE: CPT

## 2021-02-08 PROCEDURE — 7100000001 HC PACU RECOVERY - ADDTL 15 MIN: Performed by: ORTHOPAEDIC SURGERY

## 2021-02-08 PROCEDURE — 94150 VITAL CAPACITY TEST: CPT

## 2021-02-08 PROCEDURE — 97165 OT EVAL LOW COMPLEX 30 MIN: CPT

## 2021-02-08 PROCEDURE — 6360000002 HC RX W HCPCS: Performed by: ORTHOPAEDIC SURGERY

## 2021-02-08 PROCEDURE — 6360000002 HC RX W HCPCS: Performed by: ANESTHESIOLOGY

## 2021-02-08 PROCEDURE — 3600000005 HC SURGERY LEVEL 5 BASE: Performed by: ORTHOPAEDIC SURGERY

## 2021-02-08 PROCEDURE — 97116 GAIT TRAINING THERAPY: CPT

## 2021-02-08 PROCEDURE — 2709999900 HC NON-CHARGEABLE SUPPLY: Performed by: ORTHOPAEDIC SURGERY

## 2021-02-08 PROCEDURE — 6360000002 HC RX W HCPCS: Performed by: NURSE ANESTHETIST, CERTIFIED REGISTERED

## 2021-02-08 PROCEDURE — C1776 JOINT DEVICE (IMPLANTABLE): HCPCS | Performed by: ORTHOPAEDIC SURGERY

## 2021-02-08 PROCEDURE — 86850 RBC ANTIBODY SCREEN: CPT

## 2021-02-08 PROCEDURE — 88311 DECALCIFY TISSUE: CPT

## 2021-02-08 PROCEDURE — 3700000000 HC ANESTHESIA ATTENDED CARE: Performed by: ORTHOPAEDIC SURGERY

## 2021-02-08 PROCEDURE — 97530 THERAPEUTIC ACTIVITIES: CPT

## 2021-02-08 PROCEDURE — 97535 SELF CARE MNGMENT TRAINING: CPT

## 2021-02-08 PROCEDURE — 2580000003 HC RX 258: Performed by: ANESTHESIOLOGY

## 2021-02-08 PROCEDURE — C1713 ANCHOR/SCREW BN/BN,TIS/BN: HCPCS | Performed by: ORTHOPAEDIC SURGERY

## 2021-02-08 PROCEDURE — 73560 X-RAY EXAM OF KNEE 1 OR 2: CPT

## 2021-02-08 PROCEDURE — 2500000003 HC RX 250 WO HCPCS: Performed by: NURSE ANESTHETIST, CERTIFIED REGISTERED

## 2021-02-08 PROCEDURE — 97162 PT EVAL MOD COMPLEX 30 MIN: CPT

## 2021-02-08 PROCEDURE — 6370000000 HC RX 637 (ALT 250 FOR IP): Performed by: ORTHOPAEDIC SURGERY

## 2021-02-08 PROCEDURE — 3700000001 HC ADD 15 MINUTES (ANESTHESIA): Performed by: ORTHOPAEDIC SURGERY

## 2021-02-08 PROCEDURE — 88305 TISSUE EXAM BY PATHOLOGIST: CPT

## 2021-02-08 PROCEDURE — 86901 BLOOD TYPING SEROLOGIC RH(D): CPT

## 2021-02-08 PROCEDURE — 94760 N-INVAS EAR/PLS OXIMETRY 1: CPT

## 2021-02-08 PROCEDURE — 2580000003 HC RX 258: Performed by: ORTHOPAEDIC SURGERY

## 2021-02-08 PROCEDURE — 86900 BLOOD TYPING SEROLOGIC ABO: CPT

## 2021-02-08 PROCEDURE — 7100000000 HC PACU RECOVERY - FIRST 15 MIN: Performed by: ORTHOPAEDIC SURGERY

## 2021-02-08 PROCEDURE — 6360000002 HC RX W HCPCS: Performed by: NURSE PRACTITIONER

## 2021-02-08 PROCEDURE — 83036 HEMOGLOBIN GLYCOSYLATED A1C: CPT

## 2021-02-08 PROCEDURE — 2720000010 HC SURG SUPPLY STERILE: Performed by: ORTHOPAEDIC SURGERY

## 2021-02-08 PROCEDURE — 3600000015 HC SURGERY LEVEL 5 ADDTL 15MIN: Performed by: ORTHOPAEDIC SURGERY

## 2021-02-08 DEVICE — COMPONENT FEM SZ 11 STD L KNEE CO CHROM CEM POST STBL MID: Type: IMPLANTABLE DEVICE | Site: KNEE | Status: FUNCTIONAL

## 2021-02-08 DEVICE — COMPONENT PAT DIA32MM THK8.5MM KNEE POLY CEM CONVENTIONAL: Type: IMPLANTABLE DEVICE | Site: KNEE | Status: FUNCTIONAL

## 2021-02-08 DEVICE — CEMENT BNE 40GM HI VISC RADPQ FOR REV SURG: Type: IMPLANTABLE DEVICE | Site: KNEE | Status: FUNCTIONAL

## 2021-02-08 DEVICE — COMPONENT ARTC SURF PS 10-12 GH 10 MM LT TIB KNEE FIX BEAR: Type: IMPLANTABLE DEVICE | Site: KNEE | Status: FUNCTIONAL

## 2021-02-08 DEVICE — EXTENSION STEM SZ G 5DEG L TIBL KNEE CEM NAT TIB: Type: IMPLANTABLE DEVICE | Site: KNEE | Status: FUNCTIONAL

## 2021-02-08 RX ORDER — ROSUVASTATIN CALCIUM 10 MG/1
10 TABLET, COATED ORAL DAILY
Status: DISCONTINUED | OUTPATIENT
Start: 2021-02-09 | End: 2021-02-09 | Stop reason: HOSPADM

## 2021-02-08 RX ORDER — OXYCODONE HYDROCHLORIDE 10 MG/1
10 TABLET ORAL PRN
Status: DISCONTINUED | OUTPATIENT
Start: 2021-02-08 | End: 2021-02-08 | Stop reason: HOSPADM

## 2021-02-08 RX ORDER — ACETAMINOPHEN 325 MG/1
650 TABLET ORAL EVERY 6 HOURS
Status: DISCONTINUED | OUTPATIENT
Start: 2021-02-08 | End: 2021-02-09 | Stop reason: HOSPADM

## 2021-02-08 RX ORDER — PROMETHAZINE HYDROCHLORIDE 25 MG/1
12.5 TABLET ORAL EVERY 6 HOURS PRN
Status: DISCONTINUED | OUTPATIENT
Start: 2021-02-08 | End: 2021-02-09 | Stop reason: HOSPADM

## 2021-02-08 RX ORDER — SODIUM CHLORIDE 0.9 % (FLUSH) 0.9 %
10 SYRINGE (ML) INJECTION EVERY 12 HOURS SCHEDULED
Status: DISCONTINUED | OUTPATIENT
Start: 2021-02-08 | End: 2021-02-09 | Stop reason: HOSPADM

## 2021-02-08 RX ORDER — SODIUM CHLORIDE 9 MG/ML
INJECTION, SOLUTION INTRAVENOUS CONTINUOUS
Status: DISCONTINUED | OUTPATIENT
Start: 2021-02-08 | End: 2021-02-09 | Stop reason: HOSPADM

## 2021-02-08 RX ORDER — OXYCODONE HYDROCHLORIDE 5 MG/1
5 TABLET ORAL EVERY 4 HOURS PRN
Status: DISCONTINUED | OUTPATIENT
Start: 2021-02-08 | End: 2021-02-09 | Stop reason: HOSPADM

## 2021-02-08 RX ORDER — LIDOCAINE HYDROCHLORIDE 20 MG/ML
INJECTION, SOLUTION EPIDURAL; INFILTRATION; INTRACAUDAL; PERINEURAL PRN
Status: DISCONTINUED | OUTPATIENT
Start: 2021-02-08 | End: 2021-02-08 | Stop reason: SDUPTHER

## 2021-02-08 RX ORDER — SENNA AND DOCUSATE SODIUM 50; 8.6 MG/1; MG/1
1 TABLET, FILM COATED ORAL 2 TIMES DAILY
Status: DISCONTINUED | OUTPATIENT
Start: 2021-02-08 | End: 2021-02-09 | Stop reason: HOSPADM

## 2021-02-08 RX ORDER — ROCURONIUM BROMIDE 10 MG/ML
INJECTION, SOLUTION INTRAVENOUS PRN
Status: DISCONTINUED | OUTPATIENT
Start: 2021-02-08 | End: 2021-02-08 | Stop reason: SDUPTHER

## 2021-02-08 RX ORDER — FAMOTIDINE 20 MG/1
20 TABLET, FILM COATED ORAL DAILY
Status: DISCONTINUED | OUTPATIENT
Start: 2021-02-09 | End: 2021-02-09 | Stop reason: HOSPADM

## 2021-02-08 RX ORDER — DEXAMETHASONE SODIUM PHOSPHATE 4 MG/ML
INJECTION, SOLUTION INTRA-ARTICULAR; INTRALESIONAL; INTRAMUSCULAR; INTRAVENOUS; SOFT TISSUE PRN
Status: DISCONTINUED | OUTPATIENT
Start: 2021-02-08 | End: 2021-02-08 | Stop reason: SDUPTHER

## 2021-02-08 RX ORDER — CYCLOBENZAPRINE HCL 10 MG
10 TABLET ORAL 3 TIMES DAILY PRN
Status: DISCONTINUED | OUTPATIENT
Start: 2021-02-08 | End: 2021-02-09 | Stop reason: HOSPADM

## 2021-02-08 RX ORDER — DEXAMETHASONE SODIUM PHOSPHATE 4 MG/ML
4 INJECTION, SOLUTION INTRA-ARTICULAR; INTRALESIONAL; INTRAMUSCULAR; INTRAVENOUS; SOFT TISSUE ONCE
Status: COMPLETED | OUTPATIENT
Start: 2021-02-08 | End: 2021-02-08

## 2021-02-08 RX ORDER — NICOTINE POLACRILEX 4 MG
15 LOZENGE BUCCAL PRN
Status: DISCONTINUED | OUTPATIENT
Start: 2021-02-08 | End: 2021-02-09 | Stop reason: HOSPADM

## 2021-02-08 RX ORDER — MAGNESIUM HYDROXIDE 1200 MG/15ML
LIQUID ORAL CONTINUOUS PRN
Status: COMPLETED | OUTPATIENT
Start: 2021-02-08 | End: 2021-02-08

## 2021-02-08 RX ORDER — ONDANSETRON 2 MG/ML
4 INJECTION INTRAMUSCULAR; INTRAVENOUS EVERY 6 HOURS PRN
Status: DISCONTINUED | OUTPATIENT
Start: 2021-02-08 | End: 2021-02-09 | Stop reason: HOSPADM

## 2021-02-08 RX ORDER — FENTANYL CITRATE 50 UG/ML
INJECTION, SOLUTION INTRAMUSCULAR; INTRAVENOUS PRN
Status: DISCONTINUED | OUTPATIENT
Start: 2021-02-08 | End: 2021-02-08 | Stop reason: SDUPTHER

## 2021-02-08 RX ORDER — MORPHINE SULFATE 2 MG/ML
1 INJECTION, SOLUTION INTRAMUSCULAR; INTRAVENOUS EVERY 5 MIN PRN
Status: DISCONTINUED | OUTPATIENT
Start: 2021-02-08 | End: 2021-02-08 | Stop reason: HOSPADM

## 2021-02-08 RX ORDER — LISINOPRIL 10 MG/1
10 TABLET ORAL DAILY
Status: DISCONTINUED | OUTPATIENT
Start: 2021-02-09 | End: 2021-02-09 | Stop reason: HOSPADM

## 2021-02-08 RX ORDER — OXYCODONE HYDROCHLORIDE 5 MG/1
5-10 TABLET ORAL EVERY 4 HOURS PRN
Qty: 40 TABLET | Refills: 0 | Status: SHIPPED | OUTPATIENT
Start: 2021-02-08 | End: 2021-02-17 | Stop reason: SDUPTHER

## 2021-02-08 RX ORDER — OXYCODONE HYDROCHLORIDE 10 MG/1
10 TABLET ORAL EVERY 4 HOURS PRN
Status: DISCONTINUED | OUTPATIENT
Start: 2021-02-08 | End: 2021-02-09 | Stop reason: HOSPADM

## 2021-02-08 RX ORDER — MELOXICAM 7.5 MG/1
15 TABLET ORAL ONCE
Status: COMPLETED | OUTPATIENT
Start: 2021-02-08 | End: 2021-02-08

## 2021-02-08 RX ORDER — FENTANYL CITRATE 50 UG/ML
50 INJECTION, SOLUTION INTRAMUSCULAR; INTRAVENOUS EVERY 5 MIN PRN
Status: DISCONTINUED | OUTPATIENT
Start: 2021-02-08 | End: 2021-02-08 | Stop reason: HOSPADM

## 2021-02-08 RX ORDER — INSULIN GLARGINE 100 [IU]/ML
0.25 INJECTION, SOLUTION SUBCUTANEOUS NIGHTLY
Status: DISCONTINUED | OUTPATIENT
Start: 2021-02-08 | End: 2021-02-09 | Stop reason: HOSPADM

## 2021-02-08 RX ORDER — PROPOFOL 10 MG/ML
INJECTION, EMULSION INTRAVENOUS PRN
Status: DISCONTINUED | OUTPATIENT
Start: 2021-02-08 | End: 2021-02-08 | Stop reason: SDUPTHER

## 2021-02-08 RX ORDER — GABAPENTIN 300 MG/1
300 CAPSULE ORAL NIGHTLY
Status: DISCONTINUED | OUTPATIENT
Start: 2021-02-08 | End: 2021-02-09 | Stop reason: HOSPADM

## 2021-02-08 RX ORDER — LANOLIN ALCOHOL/MO/W.PET/CERES
6 CREAM (GRAM) TOPICAL NIGHTLY
Status: DISCONTINUED | OUTPATIENT
Start: 2021-02-08 | End: 2021-02-09 | Stop reason: HOSPADM

## 2021-02-08 RX ORDER — SODIUM CHLORIDE 9 MG/ML
INJECTION, SOLUTION INTRAVENOUS CONTINUOUS
Status: DISCONTINUED | OUTPATIENT
Start: 2021-02-08 | End: 2021-02-08

## 2021-02-08 RX ORDER — FENTANYL CITRATE 50 UG/ML
25 INJECTION, SOLUTION INTRAMUSCULAR; INTRAVENOUS EVERY 5 MIN PRN
Status: DISCONTINUED | OUTPATIENT
Start: 2021-02-08 | End: 2021-02-08 | Stop reason: HOSPADM

## 2021-02-08 RX ORDER — ONDANSETRON 2 MG/ML
INJECTION INTRAMUSCULAR; INTRAVENOUS PRN
Status: DISCONTINUED | OUTPATIENT
Start: 2021-02-08 | End: 2021-02-08 | Stop reason: SDUPTHER

## 2021-02-08 RX ORDER — SODIUM CHLORIDE 0.9 % (FLUSH) 0.9 %
10 SYRINGE (ML) INJECTION PRN
Status: DISCONTINUED | OUTPATIENT
Start: 2021-02-08 | End: 2021-02-08 | Stop reason: HOSPADM

## 2021-02-08 RX ORDER — DEXTROSE MONOHYDRATE 50 MG/ML
100 INJECTION, SOLUTION INTRAVENOUS PRN
Status: DISCONTINUED | OUTPATIENT
Start: 2021-02-08 | End: 2021-02-09 | Stop reason: HOSPADM

## 2021-02-08 RX ORDER — ONDANSETRON 2 MG/ML
4 INJECTION INTRAMUSCULAR; INTRAVENOUS
Status: DISCONTINUED | OUTPATIENT
Start: 2021-02-08 | End: 2021-02-08 | Stop reason: HOSPADM

## 2021-02-08 RX ORDER — BUPROPION HYDROCHLORIDE 150 MG/1
150 TABLET ORAL EVERY MORNING
Status: DISCONTINUED | OUTPATIENT
Start: 2021-02-09 | End: 2021-02-09 | Stop reason: HOSPADM

## 2021-02-08 RX ORDER — MORPHINE SULFATE 2 MG/ML
2 INJECTION, SOLUTION INTRAMUSCULAR; INTRAVENOUS EVERY 5 MIN PRN
Status: DISCONTINUED | OUTPATIENT
Start: 2021-02-08 | End: 2021-02-08 | Stop reason: HOSPADM

## 2021-02-08 RX ORDER — ASPIRIN 81 MG/1
81 TABLET ORAL 2 TIMES DAILY
Qty: 28 TABLET | Refills: 0 | Status: SHIPPED | OUTPATIENT
Start: 2021-02-08 | End: 2021-02-22

## 2021-02-08 RX ORDER — MEPERIDINE HYDROCHLORIDE 25 MG/ML
12.5 INJECTION INTRAMUSCULAR; INTRAVENOUS; SUBCUTANEOUS EVERY 5 MIN PRN
Status: DISCONTINUED | OUTPATIENT
Start: 2021-02-08 | End: 2021-02-08 | Stop reason: HOSPADM

## 2021-02-08 RX ORDER — DEXTROSE MONOHYDRATE 25 G/50ML
12.5 INJECTION, SOLUTION INTRAVENOUS PRN
Status: DISCONTINUED | OUTPATIENT
Start: 2021-02-08 | End: 2021-02-09 | Stop reason: HOSPADM

## 2021-02-08 RX ORDER — OXYCODONE HYDROCHLORIDE 5 MG/1
5 TABLET ORAL PRN
Status: DISCONTINUED | OUTPATIENT
Start: 2021-02-08 | End: 2021-02-08 | Stop reason: HOSPADM

## 2021-02-08 RX ORDER — SODIUM CHLORIDE 0.9 % (FLUSH) 0.9 %
10 SYRINGE (ML) INJECTION EVERY 12 HOURS SCHEDULED
Status: DISCONTINUED | OUTPATIENT
Start: 2021-02-08 | End: 2021-02-08 | Stop reason: HOSPADM

## 2021-02-08 RX ORDER — SODIUM CHLORIDE 0.9 % (FLUSH) 0.9 %
10 SYRINGE (ML) INJECTION PRN
Status: DISCONTINUED | OUTPATIENT
Start: 2021-02-08 | End: 2021-02-09 | Stop reason: HOSPADM

## 2021-02-08 RX ADMIN — INSULIN LISPRO 1 UNITS: 100 INJECTION, SOLUTION INTRAVENOUS; SUBCUTANEOUS at 21:54

## 2021-02-08 RX ADMIN — PROPOFOL 175 MG: 10 INJECTION, EMULSION INTRAVENOUS at 10:10

## 2021-02-08 RX ADMIN — SODIUM CHLORIDE: 9 INJECTION, SOLUTION INTRAVENOUS at 08:39

## 2021-02-08 RX ADMIN — ROCURONIUM BROMIDE 40 MG: 10 INJECTION INTRAVENOUS at 10:10

## 2021-02-08 RX ADMIN — OXYCODONE HYDROCHLORIDE 10 MG: 10 TABLET ORAL at 21:28

## 2021-02-08 RX ADMIN — ONDANSETRON 4 MG: 2 INJECTION INTRAMUSCULAR; INTRAVENOUS at 11:39

## 2021-02-08 RX ADMIN — SUGAMMADEX 200 MG: 100 INJECTION, SOLUTION INTRAVENOUS at 11:55

## 2021-02-08 RX ADMIN — MELOXICAM 15 MG: 7.5 TABLET ORAL at 08:39

## 2021-02-08 RX ADMIN — INSULIN GLARGINE 26 UNITS: 100 INJECTION, SOLUTION SUBCUTANEOUS at 21:54

## 2021-02-08 RX ADMIN — HYDROMORPHONE HYDROCHLORIDE 0.5 MG: 1 INJECTION, SOLUTION INTRAMUSCULAR; INTRAVENOUS; SUBCUTANEOUS at 12:29

## 2021-02-08 RX ADMIN — INSULIN LISPRO 1 UNITS: 100 INJECTION, SOLUTION INTRAVENOUS; SUBCUTANEOUS at 17:36

## 2021-02-08 RX ADMIN — CEFAZOLIN SODIUM 2000 MG: 10 INJECTION, POWDER, FOR SOLUTION INTRAVENOUS at 17:36

## 2021-02-08 RX ADMIN — DOCUSATE SODIUM 50 MG AND SENNOSIDES 8.6 MG 1 TABLET: 8.6; 5 TABLET, FILM COATED ORAL at 21:53

## 2021-02-08 RX ADMIN — DEXAMETHASONE SODIUM PHOSPHATE 4 MG: 4 INJECTION, SOLUTION INTRAMUSCULAR; INTRAVENOUS at 10:19

## 2021-02-08 RX ADMIN — Medication 6 MG: at 21:53

## 2021-02-08 RX ADMIN — FENTANYL CITRATE 50 MCG: 50 INJECTION, SOLUTION INTRAMUSCULAR; INTRAVENOUS at 12:56

## 2021-02-08 RX ADMIN — FENTANYL CITRATE 50 MCG: 50 INJECTION, SOLUTION INTRAMUSCULAR; INTRAVENOUS at 13:14

## 2021-02-08 RX ADMIN — FENTANYL CITRATE 100 MCG: 50 INJECTION INTRAMUSCULAR; INTRAVENOUS at 10:09

## 2021-02-08 RX ADMIN — HYDROMORPHONE HYDROCHLORIDE 0.5 MG: 1 INJECTION, SOLUTION INTRAMUSCULAR; INTRAVENOUS; SUBCUTANEOUS at 12:06

## 2021-02-08 RX ADMIN — HYDROMORPHONE HYDROCHLORIDE 0.5 MG: 1 INJECTION, SOLUTION INTRAMUSCULAR; INTRAVENOUS; SUBCUTANEOUS at 10:35

## 2021-02-08 RX ADMIN — OXYCODONE HYDROCHLORIDE 10 MG: 10 TABLET ORAL at 16:36

## 2021-02-08 RX ADMIN — INSULIN LISPRO 8 UNITS: 100 INJECTION, SOLUTION INTRAVENOUS; SUBCUTANEOUS at 17:36

## 2021-02-08 RX ADMIN — SODIUM CHLORIDE: 9 INJECTION, SOLUTION INTRAVENOUS at 15:24

## 2021-02-08 RX ADMIN — LIDOCAINE HYDROCHLORIDE 5 ML: 20 INJECTION, SOLUTION EPIDURAL; INFILTRATION; INTRACAUDAL; PERINEURAL at 10:09

## 2021-02-08 RX ADMIN — CEFAZOLIN SODIUM 2 G: 10 INJECTION, POWDER, FOR SOLUTION INTRAVENOUS at 10:06

## 2021-02-08 RX ADMIN — ACETAMINOPHEN 650 MG: 325 TABLET ORAL at 15:24

## 2021-02-08 RX ADMIN — GABAPENTIN 300 MG: 300 CAPSULE ORAL at 21:54

## 2021-02-08 RX ADMIN — HYDROMORPHONE HYDROCHLORIDE 0.5 MG: 1 INJECTION, SOLUTION INTRAMUSCULAR; INTRAVENOUS; SUBCUTANEOUS at 10:20

## 2021-02-08 RX ADMIN — DEXAMETHASONE SODIUM PHOSPHATE 4 MG: 4 INJECTION, SOLUTION INTRAMUSCULAR; INTRAVENOUS at 15:23

## 2021-02-08 RX ADMIN — ACETAMINOPHEN 650 MG: 325 TABLET ORAL at 21:54

## 2021-02-08 ASSESSMENT — PULMONARY FUNCTION TESTS
PIF_VALUE: 22
PIF_VALUE: 23
PIF_VALUE: 23
PIF_VALUE: 22
PIF_VALUE: 23
PIF_VALUE: 8
PIF_VALUE: 1
PIF_VALUE: 22
PIF_VALUE: 27
PIF_VALUE: 23
PIF_VALUE: 8
PIF_VALUE: 22
PIF_VALUE: 24
PIF_VALUE: 13
PIF_VALUE: 22
PIF_VALUE: 16
PIF_VALUE: 22
PIF_VALUE: 15
PIF_VALUE: 23
PIF_VALUE: 20
PIF_VALUE: 13
PIF_VALUE: 22
PIF_VALUE: 7
PIF_VALUE: 16
PIF_VALUE: 8
PIF_VALUE: 22
PIF_VALUE: 23
PIF_VALUE: 18
PIF_VALUE: 11
PIF_VALUE: 23
PIF_VALUE: 24
PIF_VALUE: 22
PIF_VALUE: 8
PIF_VALUE: 15
PIF_VALUE: 16
PIF_VALUE: 8
PIF_VALUE: 16
PIF_VALUE: 22
PIF_VALUE: 11
PIF_VALUE: 24
PIF_VALUE: 7
PIF_VALUE: 18
PIF_VALUE: 8
PIF_VALUE: 23
PIF_VALUE: 24
PIF_VALUE: 13
PIF_VALUE: 24
PIF_VALUE: 24
PIF_VALUE: 23
PIF_VALUE: 17
PIF_VALUE: 1
PIF_VALUE: 22
PIF_VALUE: 25
PIF_VALUE: 15
PIF_VALUE: 7
PIF_VALUE: 15
PIF_VALUE: 23
PIF_VALUE: 24
PIF_VALUE: 10
PIF_VALUE: 23
PIF_VALUE: 17
PIF_VALUE: 24
PIF_VALUE: 23
PIF_VALUE: 17
PIF_VALUE: 15
PIF_VALUE: 22
PIF_VALUE: 7
PIF_VALUE: 22
PIF_VALUE: 22
PIF_VALUE: 8
PIF_VALUE: 5
PIF_VALUE: 23
PIF_VALUE: 7
PIF_VALUE: 8
PIF_VALUE: 7
PIF_VALUE: 23

## 2021-02-08 ASSESSMENT — PAIN DESCRIPTION - ONSET
ONSET: ON-GOING

## 2021-02-08 ASSESSMENT — PAIN DESCRIPTION - PAIN TYPE
TYPE: SURGICAL PAIN

## 2021-02-08 ASSESSMENT — PAIN DESCRIPTION - PROGRESSION
CLINICAL_PROGRESSION: NOT CHANGED
CLINICAL_PROGRESSION: GRADUALLY IMPROVING
CLINICAL_PROGRESSION: GRADUALLY IMPROVING
CLINICAL_PROGRESSION: GRADUALLY WORSENING
CLINICAL_PROGRESSION: NOT CHANGED
CLINICAL_PROGRESSION: NOT CHANGED

## 2021-02-08 ASSESSMENT — PAIN - FUNCTIONAL ASSESSMENT
PAIN_FUNCTIONAL_ASSESSMENT: PREVENTS OR INTERFERES SOME ACTIVE ACTIVITIES AND ADLS
PAIN_FUNCTIONAL_ASSESSMENT: PREVENTS OR INTERFERES SOME ACTIVE ACTIVITIES AND ADLS
PAIN_FUNCTIONAL_ASSESSMENT: 0-10
PAIN_FUNCTIONAL_ASSESSMENT: PREVENTS OR INTERFERES SOME ACTIVE ACTIVITIES AND ADLS

## 2021-02-08 ASSESSMENT — PAIN SCALES - GENERAL
PAINLEVEL_OUTOF10: 0
PAINLEVEL_OUTOF10: 8
PAINLEVEL_OUTOF10: 5
PAINLEVEL_OUTOF10: 5
PAINLEVEL_OUTOF10: 7

## 2021-02-08 ASSESSMENT — PAIN DESCRIPTION - LOCATION
LOCATION: KNEE

## 2021-02-08 ASSESSMENT — PAIN DESCRIPTION - ORIENTATION
ORIENTATION: LEFT

## 2021-02-08 ASSESSMENT — PAIN DESCRIPTION - FREQUENCY
FREQUENCY: INTERMITTENT
FREQUENCY: INTERMITTENT
FREQUENCY: CONTINUOUS
FREQUENCY: INTERMITTENT
FREQUENCY: INTERMITTENT

## 2021-02-08 ASSESSMENT — PAIN DESCRIPTION - DESCRIPTORS
DESCRIPTORS: DISCOMFORT;SORE
DESCRIPTORS: DISCOMFORT
DESCRIPTORS: DISCOMFORT
DESCRIPTORS: DISCOMFORT;SORE
DESCRIPTORS: DISCOMFORT
DESCRIPTORS: DISCOMFORT

## 2021-02-08 ASSESSMENT — ENCOUNTER SYMPTOMS: SHORTNESS OF BREATH: 0

## 2021-02-08 NOTE — PROGRESS NOTES
Educated patient on purpose of 4 eyes skin assessment and asked patient if allowed to perform, patient verbalized understanding and agreed to assessment. 4 Eyes Skin Assessment     The patient is being assess for  Post-Op Surgical    I agree that 2 RN's have performed a thorough Head to Toe Skin Assessment on the patient. ALL assessment sites listed below have been assessed. Areas assessed by both nurses: ayaan and zakiya  [x]   Head, Face, and Ears   [x]   Shoulders, Back, and Chest  [x]   Arms, Elbows, and Hands   [x]   Coccyx, Sacrum, and IschIum  [x]   Legs, Feet, and Heels        Does the Patient have Skin Breakdown? Blanchable redness to bilateral ears and right foot all toes.          Kraig Prevention initiated:  Yes   Wound Care Orders initiated:  NA      [facility] nurse consulted for Pressure Injury (Stage 3,4, Unstageable, DTI, NWPT, and Complex wounds), New and Established Ostomies:  NA      Nurse 1 eSignature: Electronically signed by Mariella Sen RN on 2/8/21 at 2:57 PM EST    **SHARE this note so that the co-signing nurse is able to place an eSignature**    Nurse 2 eSignature: Electronically signed by Ngozi Sparks RN on 2/8/21 at 3:27 PM EST

## 2021-02-08 NOTE — PROGRESS NOTES
Pt arrived to unit at 1415 . Pt is alert and oriented X4. Pt oriented to unit and to room. Pt oriented to call light and to phone. White board updated. Pt denies any further needs at this time. Family at bedside. Will continue to monitor and assess.    Electronically signed by Gracia Lua RN on 2/8/2021 at 2:29 PM

## 2021-02-08 NOTE — PROGRESS NOTES
Pt wakes to v/o. VSS. Pt states pain better. IV zofran given for nausea. Pt stable ready for transport to room.

## 2021-02-08 NOTE — PROGRESS NOTES
Physical Therapy    Facility/Department: [address] ORTHOPEDICS  Initial Assessment    NAME: Aylin Finley  : 1950  MRN: [id number]    Date of Service: 2021    Assessment / Discharge Recommendations:  -good start with initial efforts OOB   -anticipate home tomorrow (to his sister's home)   -will see in AM to assess readiness for home  -aarom <10 to ~80 degrees      Body structures, Functions, Activity limitations: Decreased functional mobility ; Decreased ADL status; Decreased ROM; Decreased strength  Prognosis: Good  Decision Making: Medium Complexity  REQUIRES PT FOLLOW UP: Yes  Activity Tolerance  Activity Tolerance: Patient Tolerated treatment well       Patient Diagnosis(es): The primary encounter diagnosis was Primary osteoarthritis of left knee. A diagnosis of Osteoarthritis of left knee, unspecified osteoarthritis type was also pertinent to this visit. has a past medical history of Acid reflux, Arthritis, Depression, Hyperlipidemia, Hypertension, Meniere disease, Sleep apnea, Wears glasses, and Wears partial dentures. has a past surgical history that includes Cataract removal with implant (Bilateral); other surgical history (Left); Hand Fusion (Bilateral); hernia repair (Bilateral); Colonoscopy; UPPP; orif proximal ulna fracture (Left, 2019); fracture surgery; Colonoscopy (N/A, 2019); Tonsillectomy; eye surgery (Left); and Knee cartilage surgery.     Restrictions  Restrictions/Precautions  Restrictions/Precautions: Weight Bearing  Lower Extremity Weight Bearing Restrictions  Left Lower Extremity Weight Bearing: Weight Bearing As Tolerated  Vision/Hearing  Vision: Impaired  Vision Exceptions: Wears glasses at all times  Hearing: Exceptions to [facility]  Hearing Exceptions: Bilateral hearing aid     Subjective  General  Chart Reviewed: Yes  Patient assessed for rehabilitation services?: Yes  Additional Pertinent Hx: here for elective left TKR  Response To Previous Treatment: Not applicable Family / Caregiver Present: No  Follows Commands: Within Functional Limits  Subjective  Subjective: arrived to room along with OT to patient resting awake in bed - oriented and agreeable to PTOT assessments and OOB to ambulate to bathroom and on to the recliner - has mild complaints of \"queasy\"  Pain Screening  Patient Currently in Pain: (minimal pain)  Orientation  Orientation  Overall Orientation Status: Within Functional Limits  Social/Functional History  Social/Functional History  Lives With: Alone(staying with sister)  Type of Home: (Condo)  Home Layout: One level, Able to Live on Main level with bedroom/bathroom  Home Access: Stairs to enter without rails  Entrance Stairs - Number of Steps: 3  Bathroom Shower/Tub: Walk-in shower  Bathroom Toilet: (RTS)  Bathroom Equipment: Built-in shower seat, Hand-held shower  Home Equipment: Rolling walker  ADL Assistance: Independent  Homemaking Assistance: Independent  Ambulation Assistance: Independent  Transfer Assistance: Independent  Active : Yes  Occupation: Retired  Additional Comments: Informaton above for sister's place he is staying with 2 weeks after surgery  Objective  ROM and strength non-surgical limbs grossly wfl  Motor Control  Gross Motor?: WFL  Bed mobility  Supine to Sit: Stand by assistance  Transfers  Sit to Stand: Contact guard assistance  Stand to sit: Contact guard assistance  Ambulation  Ambulation?: Yes  Ambulation 1  Surface: level tile  Device: Rolling Walker  Assistance: Contact guard assistance  Quality of Gait: step to pattern progressing to partial step through pattern with good heel contact and good weight bearing  Distance: in room for ~30 feet overall  Comments: stable on the walker  Stairs/Curb  Stairs?: No     Balance  Comments: midline in sitting at the EOB and in static stance on the walker   -dynamic is fair/good on rolling walker (limited observation)  Exercises  Ankle Pumps: 30 per hour

## 2021-02-08 NOTE — PROGRESS NOTES
[facility] Orthopedic Surgery   Progress Note      S/P :  SUBJECTIVE  In bed. Alert and oriented. . Pain is   described in leftk nee and with the intensity of moderate. Pain is described as aching. OBJECTIVE              Physical                      VITALS:  BP (!) 152/78   Pulse 78   Temp 97.6 °F (36.4 °C) (Axillary)   Resp 17   Ht 5' 9\" (1.753 m)   Wt 227 lb 2.9 oz (103 kg)   SpO2 95%   BMI 33.55 kg/m²                     MUSCULOSKELETAL:  left foot NVI. Wiggles toes to command. Pedal pulses are palpable. NEUROLOGIC:                                  Sensory:  Touch:  Left Lower Extremity:  normal                                                 Surgical wound appears clean and dry left knee with ACE and ice packs.      Data       CBC:   Lab Results   Component Value Date    WBC 5.2 01/25/2021    RBC 4.92 01/25/2021    HGB 16.0 01/25/2021    HCT 46.4 01/25/2021    MCV 94.3 01/25/2021    MCH 32.5 01/25/2021    MCHC 34.5 01/25/2021    RDW 12.7 01/25/2021     01/25/2021    MPV 6.3 01/25/2021        WBC:    Lab Results   Component Value Date    WBC 5.2 01/25/2021        Hemoglobin/Hematocrit:    Lab Results   Component Value Date    HGB 16.0 01/25/2021    HCT 46.4 01/25/2021        PT/INR:    Lab Results   Component Value Date    PROTIME 11.8 01/25/2021    INR 1.02 01/25/2021              Current Inpatient Medications             Current Facility-Administered Medications: [START ON 2/9/2021] buPROPion (WELLBUTRIN XL) extended release tablet 150 mg, 150 mg, Oral, QAM  cyclobenzaprine (FLEXERIL) tablet 10 mg, 10 mg, Oral, TID PRN  gabapentin (NEURONTIN) capsule 300 mg, 300 mg, Oral, Nightly  [START ON 2/9/2021] lisinopril (PRINIVIL;ZESTRIL) tablet 10 mg, 10 mg, Oral, Daily  melatonin tablet 6 mg, 6 mg, Oral, Nightly  [START ON 2/9/2021] famotidine (PEPCID) tablet 20 mg, 20 mg, Oral, Daily  [START ON 2/9/2021] rosuvastatin (CRESTOR) tablet 10 mg, 10 mg, Oral, Daily insulin glargine (LANTUS) injection vial 26 Units, 0.25 Units/kg, Subcutaneous, Nightly  insulin lispro (HUMALOG) injection vial 8 Units, 0.08 Units/kg, Subcutaneous, TID WC  insulin lispro (HUMALOG) injection vial 0-6 Units, 0-6 Units, Subcutaneous, TID WC  insulin lispro (HUMALOG) injection vial 0-3 Units, 0-3 Units, Subcutaneous, Nightly  glucose (GLUTOSE) 40 % oral gel 15 g, 15 g, Oral, PRN  dextrose 50 % IV solution, 12.5 g, Intravenous, PRN  glucagon (rDNA) injection 1 mg, 1 mg, Intramuscular, PRN  dextrose 5 % solution, 100 mL/hr, Intravenous, PRN  0.9 % sodium chloride infusion, , Intravenous, Continuous  sodium chloride flush 0.9 % injection 10 mL, 10 mL, Intravenous, 2 times per day  sodium chloride flush 0.9 % injection 10 mL, 10 mL, Intravenous, PRN  acetaminophen (TYLENOL) tablet 650 mg, 650 mg, Oral, Q6H  HYDROmorphone (DILAUDID) injection 0.25 mg, 0.25 mg, Intravenous, Q3H PRN **OR** HYDROmorphone (DILAUDID) injection 0.5 mg, 0.5 mg, Intravenous, Q3H PRN  sennosides-docusate sodium (SENOKOT-S) 8.6-50 MG tablet 1 tablet, 1 tablet, Oral, BID  magnesium hydroxide (MILK OF MAGNESIA) 400 MG/5ML suspension 30 mL, 30 mL, Oral, Daily PRN  ceFAZolin (ANCEF) 2000 mg in dextrose 5 % 100 mL IVPB, 2,000 mg, Intravenous, Q8H  oxyCODONE (ROXICODONE) immediate release tablet 5 mg, 5 mg, Oral, Q4H PRN **OR** oxyCODONE HCl (OXY-IR) immediate release tablet 10 mg, 10 mg, Oral, Q4H PRN  promethazine (PHENERGAN) tablet 12.5 mg, 12.5 mg, Oral, Q6H PRN **OR** ondansetron (ZOFRAN) injection 4 mg, 4 mg, Intravenous, Q6H PRN  [START ON 2/9/2021] enoxaparin (LOVENOX) injection 40 mg, 40 mg, Subcutaneous, Daily    ASSESSMENT AND PLAN      Post left TKA, stable exam  DVT prophylaxis ordered, Lovenox as inpt then switch to ASA 81mg twice at day for 14 days for DVT prophylaxis  PT OT for ADL's and ambulation as tolerated  SS for DC planning, home with  Home care tomorrow  IV or PO pain med as ordered    Verito Lee 2/8/2021  3:03 PM

## 2021-02-08 NOTE — ANESTHESIA PRE PROCEDURE
Department of Anesthesiology  Preprocedure Note       Name:  Archana Ivey   Age:  65 y.o.  :  1950                                          MRN:  [id number]         Date:  2021      Surgeon: Kasia Villa):  Mariano Wyatt MD    Procedure: TOTAL KNEE REPLACEMENT- LEFT KNEE ROBOTIC ASSISTED (Left Knee)    Medications prior to admission:   Prior to Admission medications    Medication Sig Start Date End Date Taking? Authorizing Provider   gabapentin (NEURONTIN) 300 MG capsule 1 tablet at bedtime beginning 5 days prior to surgery, then resume when home. Begin every other night on post op day 27 until gone 21  Mariano Wyatt MD   rosuvastatin (CRESTOR) 10 MG tablet Take 10 mg by mouth daily    Historical Provider, MD   Ascorbic Acid (VITAMIN C) 500 MG CAPS Take 500 mg by mouth daily     Historical Provider, MD   B Complex Vitamins (B COMPLEX 1 PO) B Complex   1 daily 16   Historical Provider, MD   Cholecalciferol (VITAMIN D-3 PO) Vitamin D3 DAILY    Historical Provider, MD   buPROPion (WELLBUTRIN XL) 150 MG extended release tablet Take 150 mg by mouth every morning  3/11/19   Historical Provider, MD   cyclobenzaprine (FLEXERIL) 10 MG tablet Take 10 mg by mouth 3 times daily as needed  12/21/10   Historical Provider, MD   lisinopril (PRINIVIL;ZESTRIL) 10 MG tablet Take by mouth daily  3/11/19   Historical Provider, MD   melatonin 5 MG TABS tablet melatonin 5 mg tablet   Take 1 tablet every day by oral route at BEDTIME    Historical Provider, MD   ranitidine (ZANTAC) 150 MG tablet ranitidine 150 mg tablet   TAKE ONE TABLET BY MOUTH DAILY NIGHTLY    Historical Provider, MD   acetaminophen (TYLENOL) 325 MG tablet Take 650 mg by mouth daily    Historical Provider, MD       Current medications:    No current facility-administered medications for this visit. No current outpatient medications on file.      Facility-Administered Medications Ordered in Other Visits Medication Dose Route Frequency Provider Last Rate Last Admin    0.9 % sodium chloride infusion   Intravenous Continuous Eden Agustin MD        sodium chloride flush 0.9 % injection 10 mL  10 mL Intravenous 2 times per day Eden Agustin MD        sodium chloride flush 0.9 % injection 10 mL  10 mL Intravenous PRN Eden Agustin MD        ceFAZolin (ANCEF) 2000 mg in dextrose 5 % 100 mL IVPB  2 g Intravenous Once Kraig Davies MD        meloxicam ISRA LOW [facility]) tablet 15 mg  15 mg Oral Once Kraig Davies MD           Allergies:    No Known Allergies    Problem List:    Patient Active Problem List   Diagnosis Code    Closed displaced transverse fracture of shaft of left ulna S52.222A       Past Medical History:        Diagnosis Date    Acid reflux     Arthritis     Depression     Hyperlipidemia     Hypertension     Meniere disease     Sleep apnea     does not use cpap machine    Wears glasses     Wears partial dentures        Past Surgical History:        Procedure Laterality Date    CATARACT REMOVAL WITH IMPLANT Bilateral     COLONOSCOPY      EVERY 5 YEARS    COLONOSCOPY N/A 2019    COLONOSCOPY WITH BIOPSY performed by Talita Joseph MD at [address] [place] [address] Left     bilateral cataract    FRACTURE SURGERY      HAND FUSION Bilateral     HERNIA REPAIR Bilateral     X4-2 ON EACH SIDE    KNEE CARTILAGE SURGERY      left knee    ORIF PROXIMAL ULNA FRACTURE Left 2019    OPEN REDUCTION AND INTERNAL FIXATION LEFT ULNA SHAFT FRACTURE WITH MINI C-ARM performed by Edwina Lynch MD at [place] [address]. Left     SHUNT PUT IN LEFT EAR FOR MENIERE'S DISEASE    TONSILLECTOMY      UPPP UVULOPALATOPHARYGOPLASTY      FOR SLEEP APNEA       Social History:    Social History     Tobacco Use    Smoking status: Former Smoker     Packs/day: 0.50     Years: 15.00     Pack years: 7.50     Types: Cigarettes     Quit date: 1977     Years since quittin.7 (+) sleep apnea: on noncompliant,      (-) COPD, asthma, shortness of breath and recent URI                          ROS comment: Hx UPP for sleep apnea   Cardiovascular:  Exercise tolerance: good (>4 METS),   (+) hypertension:, hyperlipidemia    (-) valvular problems/murmurs, past MI, CAD, CABG/stent, dysrhythmias,  angina,  CHF and murmur      Rhythm: regular  Rate: normal                    Neuro/Psych:   (+) psychiatric history:depression/anxiety    (-) seizures, neuromuscular disease, TIA and CVA           GI/Hepatic/Renal:   (+) GERD: well controlled, bowel prep,      (-) PUD, hepatitis, liver disease and no renal disease       Endo/Other:        (-) diabetes mellitus, hypothyroidism, hyperthyroidism                ROS comment: Has broken left ulna-cast Abdominal:           Vascular:                                        Anesthesia Plan      general     ASA 3       Induction: intravenous. MIPS: Postoperative opioids intended and Prophylactic antiemetics administered. Anesthetic plan and risks discussed with patient. Plan discussed with CRNA. This pre-anesthesia assessment may be used as a history and physical.    DOS STAFF ADDENDUM:    Pt seen and examined, chart reviewed (including anesthesia, drug and allergy history). No interval changes to history and physical examination. Anesthetic plan, risks, benefits, alternatives, and personnel involved discussed with patient. Patient verbalized an understanding and agrees to proceed.       Gilson Hartman MD  February 8, 2021  8:02 AM      Gilson Hartman MD   2/8/2021

## 2021-02-08 NOTE — OP NOTE
Operative Note      Patient: Evan Arevalo  YOB: 1950  MRN: [id number]    Date of Procedure: 2/8/2021    Pre-Op Diagnosis: OSTEOARTHRITIS- LEFT KNEE    Post-Op Diagnosis: Same       Procedure(s):  TOTAL KNEE REPLACEMENT- LEFT KNEE ROBOTIC ASSISTED    Surgeon(s):  Favian Khanna MD    Assistant:   Surgical Assistant: Norman Alfaro; Dusty Cunningham RN    Anesthesia: General    Estimated Blood Loss (mL): 366     Complications: None    Specimens:   * No specimens in log *    Implants:  * No implants in log *      Drains: * No LDAs found *    Findings: severe OA left knee    Detailed Description of Procedure:     PREOPERATIVE DIAGNOSIS: Left knee osteoarthritis. POSTOPERATIVE DIAGNOSIS: Left knee osteoarthritis. PROCEDURE: Robotic assisted left total knee arthroplasty. SURGEON: Marcy Titus MD.       ANESTHESIA: General endotracheal anesthesia. IV FLUIDS: Crystalloid. ESTIMATED BLOOD LOSS: 123 ml     COMPLICATIONS: None. The patient tolerated the procedure quite well. COMPONENTS: A Lauryn size 11 standard cemented persona femur, size G cemented metal tibial tray, size 32 patellar button, and a size 11 mm PS congruent ultra-high molecular weight polyethylene spacer. INDICATIONS: The patient is a 64-year-old gentleman with a longstanding history   of left knee pain. History of injury: repetitive injury . He failed all conservative measures including injections, PT and NSAIDs . X-rays confirmed severe osteoarthritis. Due to this fact, the patient was ultimately cleared and scheduled for left total knee arthroplasty. DESCRIPTION OF PROCEDURE: The patient was identified preoperatively. The proper extremity was marked. The patient was then taken to the operating room and general endotracheal anesthesia was administered by Anesthesia. Appropriate preoperative antibiotics were administered. Nonsterile tourniquet was applied to the thigh. The left lower extremity was then chloraprepped in the standard fashion. We then draped out in standard fashion. We sealed off the skin with the Ioban. We then   elevated the tourniquet to 250 mmHg. We then made a standard anterior longitudinal midline incision. We incised skin and coagulated all bleeders with Bovie electrocautery. We dissected down and did perform a medial parapatellar arthrotomy in standard fashion. We did perform a medial release due to the varus deformity. We then excised the fat pad. We then brought the Zoe/robotic arm and registered the robotic arm. We then inserted our femoral and tibial pins. The femoral pins were intra-incisional.  The tibial pins were extra incisional.  The first pin was placed approximately 3 finger breaths below the tibial tubercle. We then went ahead and registered the left lower extremity and went through all checkpoints for the femur and the tibia. We first identified the femoral hip center of rotation. We then went through all checkpoints for the femur. We then went through all checkpoints for the tibia. We then did our soft tissue/laxity evaluation both in flexion and in extension. We then were ready for referral to the computer and we finalized our final cuts. We did make several adjustments. The patient started off with a 2 degree varus deformity and 2 degree flexion contracture. The adjustments made were: 7 degrees slope on the tibia, we took off 1 mm more from the tibia and 2-1/2 mm more from the posterior femur, our external rotation was 5 degrees for the femur and we set 1 degree of varus into the tibia and 1/2 degree of varus into the femur. We then brought in the robotic arm and pinned our cutting block for the distal femoral cut. We then made a nice flat cut on the femur. We then removed the bone. We then brought in the cutting block again and pinned for 5 degrees of external rotation for a 11 femur. We then attached our 4 in 1 cutting block and this was seated without difficulty. We then made our cuts and the bone was removed. We then brought in the robotic arm and pinned the cutting block for our proximal tibia cut. We made a nice flat cut and the bone was removed. We removed the medial and lateral meniscus without difficulty. We then sized our tibial component and pinned the appropriate trial.  We made sure to externally rotate the tibial component towards the medial one third of the tibial tubercle. We then drilled and broached for the stem of the component accordingly. We then went ahead and seated the actual trial femur. The box cutting guide was then pinned and we then cut the box for our PS components. We then snapped in a trial surface. We then went ahead and finished the patella.   We measured our patellar thickness. We then used the appropriate reamer and reamed down accordingly. We drilled our peg holes in superior medial fashion. The patella tracked rather well. We then removed all trial components. We injected the posterior capsule and myofascial planes posteriorly with the Ortho mix. We then irrigated the bone and the knee rather copiously. We then were ready for cementing of our components. We first cemented the tibia. We then cemented the femur. We snapped in our trial surface and allowed the knee out to full extension. We then cemented the patella. We allowed the cement to harden. We then went ahead and removed all excess cement without difficulty. The knee was symmetrically balanced. The PCL was excised. The patella tracked rather well. We injected the posterior capsule and myofascial planes posteriorly with the Ortho mix. We then irrigated the bone and the knee rather copiously. The knee was symmetrically balanced. We then went ahead and snapped in the size 10 mm medial congruent highly cross-linked polyethylene surface. There was no liftoff in flexion. The knee did get out to full extension. The patient ended up with a 1.5 degree varus alignment. Our gaps in extension and flexion were well balanced and symmetric both medially and laterally. We then injected the subcutaneous tissues and myofascial planes with the Ortho mix. We sprayed the knee with the Irrisept. The patient was given IV tranexamic acid 1 g pre-and post operatively. We then were ready for closure. We closed our arthrotomy with interrupted figure-of-eight #1 Vicryl stitches. We then closed the subcutaneous tissues with running strata fix. We then closed the skin with the Prineo. Sterile dressings were applied. There were no complications.      Electronically signed by Avani Deleon MD on 2/8/2021 at 9:22 AM

## 2021-02-08 NOTE — PLAN OF CARE
Problem: Cardiac:  Goal: Hemodynamic stability will improve  Description: Hemodynamic stability will improve  Outcome: Ongoing  Note: Hemodynamic stability will improve on this shift      Problem: Discharge Planning:  Goal: Discharged to appropriate level of care  Description: Discharged to appropriate level of care  Outcome: Ongoing  Note: Pt will be discharged to the appropriate level of care     Problem: Mobility - Impaired:  Goal: Mobility will improve  Description: Mobility will improve  Outcome: Ongoing  Note: Mobility will improve      Problem: Infection - Surgical Site:  Goal: Will show no infection signs and symptoms  Description: Will show no infection signs and symptoms  Outcome: Ongoing  Note: Monitoring pt for signs and symptoms of infection. Will observe for any redness, warmth, or pus. Problem: Pain - Acute:  Goal: Pain level will decrease  Description: Pain level will decrease  Outcome: Ongoing  Note: Assessing and monitoring pt's pain. PRN pain medication being given if ordered. Educating pt on nonpharmacologic interventions for pain control. Will continue to monitor and reassess. Problem: Falls - Risk of:  Goal: Will remain free from falls  Description: Will remain free from falls  Outcome: Ongoing  Note: Fall risk assessment completed. Fall precautions in place. Bed in lowest position, wheels locked, bed/chair exit alarm in place, call light within reach, and non skid footwear on. Walkway free of clutter. Pt alert and oriented and able to make needs known. Pt educated to use call light when needing to get up, and pt utilizes call light to make needs known. Will continue to monitor.         Problem: Falls - Risk of:  Goal: Absence of physical injury  Description: Absence of physical injury  Outcome: Ongoing  Note: Pt absent from physical injury on this shift      Problem: Skin Integrity:  Goal: Will show no infection signs and symptoms Description: Will show no infection signs and symptoms  Outcome: Ongoing  Note: Monitoring pt for signs and symptoms of infection. Will observe for any redness, warmth, or pus. Problem: Skin Integrity:  Goal: Absence of new skin breakdown  Description: Absence of new skin breakdown  Outcome: Ongoing  Note: Pt absent from new skin breakdown on this shift      Problem: Pain:  Goal: Pain level will decrease  Description: Pain level will decrease  Outcome: Ongoing  Note: Assessing and monitoring pt's pain. PRN pain medication being given if ordered. Educating pt on nonpharmacologic interventions for pain control. Will continue to monitor and reassess. Problem: Pain:  Goal: Control of acute pain  Description: Control of acute pain  Outcome: Ongoing  Note: Assessing and monitoring pt's pain. PRN pain medication being given if ordered. Educating pt on nonpharmacologic interventions for pain control. Will continue to monitor and reassess. Problem: Pain:  Goal: Control of chronic pain  Description: Control of chronic pain  Outcome: Ongoing  Note: Assessing and monitoring pt's pain. PRN pain medication being given if ordered. Educating pt on nonpharmacologic interventions for pain control. Will continue to monitor and reassess.

## 2021-02-08 NOTE — PROGRESS NOTES
Met with patient at bedside, patient is alert and orientedx4. discussed role of nurse navigator and gave contact information. Reviewed reasons to call with questions or concerns, importance of TEDS, Incentive spirometer, pain medication, and physical and occupational therapy. 2/4 bed rails up, bed in lowest position, fall precautions in place, call light within reach. Pulses present bilaterally +2 pedal, no drainage or odor noted at surgical dressing left knee. ace Dressing clean, dry, and intact. Ice in place. Franklyn and scds on RLE. Neurovascular checks performed and WNLs with exception of some  Decreased sensation to left foot, patient denies tingling. DC Plan: to sister's home with sister rubi and [facility]. rubi to transport patient. DME needs:denies, has rolling walker, shower chair, and raised toilet seat.     Mayank Stone  Orthopedic Nurse Navigator  Phone number: [phone]    Future Appointments   Date Time Provider Thea Pepper   2/23/2021  1:15 PM MD Thomas Ledesma     Electronically signed by Hayley Mcleod RN on 2/8/2021 at 2:58 PM

## 2021-02-08 NOTE — H&P
The patient was interviewed and examined and there have been no changes since the documented History and Physical.  The patient was counseled at length about the risks of rachana Covid-19 during their perioperative period and any recovery window from their procedure. The patient was made aware that rachana Covid-19  may worsen their prognosis for recovering from their procedure  and lend to a higher morbidity and/or mortality risk. All material risks, benefits, and reasonable alternatives including postponing the procedure were discussed. The patient does wish to proceed with the procedure at this time.       Electronically signed by Vishal Trinh MD on 2/8/2021 at 9:20 AM

## 2021-02-08 NOTE — PROGRESS NOTES
Occupational Therapy   Occupational Therapy Initial Assessment  Date: 2021   Patient Name: Whitley Bhakta  MRN: [id number]     : 1950    Date of Service: 2021    Discharge Recommendations:  Home with Home health OT, Patient would benefit from continued therapy after discharge, 24 hour supervision or assist, 2-3 sessions per week  OT Equipment Recommendations  Equipment Needed: No     Whitley Bhakta scored a 17/24 on the AM-PAC ADL Inpatient form. Current research shows that an AM-PAC score of 18 or greater is typically associated with a discharge to the patient's home setting. Based on the patient's AM-PAC score, and their current ADL deficits, it is recommended that the patient have 2-3 sessions per week of Occupational Therapy at d/ to increase the patient's independence. At this time, this patient demonstrates the endurance and safety to discharge home with [facility] and a follow up treatment frequency of 2-3x/wk. Please see assessment section for further patient specific details. If patient discharges prior to next session this note will serve as a discharge summary. Please see below for the latest assessment towards goals. Assessment   Performance deficits / Impairments: Decreased functional mobility ; Decreased safe awareness;Decreased balance;Decreased ADL status; Decreased high-level IADLs  Assessment: Pt is a 64 yo male admitted  for an elective L TKA. PTA, pt lives alone and is independent with ADLs, IADLs, fxl mobility, and driving. Pt will be staying at [facility] at d/ in which pt/sister has all DME needs. Today, pt required max A for LB dressing, CGA for fxl transfers, mobility with RW, and toileting, and SBA for supine>sit. Pt with mild unsteadiness with household distance with RW, no LOB, and moderate cues required for walker management. latesha acute OT to address above deficits.  Rec d/c to [facility] with 24 hr supervision/assist and HHOT 2-3x/week Treatment Diagnosis: L TKA  Prognosis: Good  Decision Making: Low Complexity  OT Education: OT Role;Plan of Care;Transfer Training  Patient Education: WBAT. No twisting knee with turning-small steps needed. Walker management  REQUIRES OT FOLLOW UP: Yes  Activity Tolerance  Activity Tolerance: Patient Tolerated treatment well  Activity Tolerance: limited by nausea  Safety Devices  Safety Devices in place: Yes  Type of devices: Nurse notified; Left in chair;Patient at risk for falls;Gait belt;Call light within reach; Chair alarm in place         Patient Diagnosis(es): The primary encounter diagnosis was Primary osteoarthritis of left knee. A diagnosis of Osteoarthritis of left knee, unspecified osteoarthritis type was also pertinent to this visit. has a past medical history of Acid reflux, Arthritis, Depression, Hyperlipidemia, Hypertension, Meniere disease, Sleep apnea, Wears glasses, and Wears partial dentures. has a past surgical history that includes Cataract removal with implant (Bilateral); other surgical history (Left); Hand Fusion (Bilateral); hernia repair (Bilateral); Colonoscopy; UPPP; orif proximal ulna fracture (Left, 5/7/2019); fracture surgery; Colonoscopy (N/A, 5/24/2019); Tonsillectomy; eye surgery (Left); and Knee cartilage surgery.     Treatment Diagnosis: L TKA      Restrictions  Restrictions/Precautions  Restrictions/Precautions: Weight Bearing  Lower Extremity Weight Bearing Restrictions  Left Lower Extremity Weight Bearing: Weight Bearing As Tolerated    Subjective   General  Chart Reviewed: Yes, Orders, Progress Notes, History and Physical, Operative Notes  Patient assessed for rehabilitation services?: Yes  Additional Pertinent Hx: Admitted 2/8 elective L TKA  Family / Caregiver Present: No  Referring Practitioner: Damian Sauer MD  Diagnosis: Admitted 2/8 elective L TKA  Subjective  Subjective: Pt agreeable to OT/PT eval. Pt with nausea with mobility  General Comment Comments: RN jaelyn batista    Social/Functional History  Social/Functional History  Lives With: Alone(staying with sister)  Type of Home: (Condo)  Home Layout: One level, Able to Live on Main level with bedroom/bathroom  Home Access: Stairs to enter without rails  Entrance Stairs - Number of Steps: 3  Bathroom Shower/Tub: Walk-in shower  Bathroom Toilet: (RTS)  Bathroom Equipment: Built-in shower seat, Hand-held shower  Home Equipment: Rolling walker  ADL Assistance: Independent  Homemaking Assistance: Independent  Ambulation Assistance: Independent  Transfer Assistance: Independent  Active : Yes  Occupation: Retired  Additional Comments: Informaton above for sister's place he is staying with 2 weeks after surgery     Objective   Vision: Impaired  Vision Exceptions: Wears glasses at all times  Hearing: Exceptions to [facility]  Hearing Exceptions: Bilateral hearing aid    Orientation  Orientation Level: Oriented to place;Oriented to time;Oriented to situation;Oriented to person  Observation/Palpation  Observation: LLE ace bandage  Balance  Sitting Balance: Stand by assistance  Standing Balance: Contact guard assistance  Standing Balance  Time: PRN  Activity: transfers  Functional Mobility  Functional - Mobility Device: Rolling Walker  Activity: (bed>toilet>chair)  Assist Level: Contact guard assistance  Functional Mobility Comments: pt with unsteady gait, however, no LOB or need for min A  Toilet Transfers  Toilet - Technique: Ambulating(RW)  Equipment Used: Standard toilet(grab bars)  Toilet Transfer: Contact guard assistance  ADL  LE Dressing: Maximum assistance  Toileting: Contact guard assistance  Additional Comments: Anticipate supervision with UB dressing and seated grooming and Mod A with bathing for steadying and lower extremities based on fxl tranfsers and mobility, standing tolerance, and safety awareness  Tone RUE  RUE Tone: Normotonic  Tone LUE  LUE Tone: Normotonic  Coordination

## 2021-02-08 NOTE — CARE COORDINATION
2/8  spoke with patient and confirmed JET Class plan to return home with [facility]  and the support of his sister Armani Stevens. Confirmed address - he will be staying at his sister Silvana's home:  [place]   [address] [address] [facility]  Ph# [phone]    Sw made referral to Michela olivas/ [facility].   Electronically signed by Preethi Torres on 2/8/2021 at 4:18 PM  #[phone]

## 2021-02-09 VITALS
HEIGHT: 69 IN | TEMPERATURE: 97.9 F | OXYGEN SATURATION: 97 % | BODY MASS INDEX: 34.22 KG/M2 | SYSTOLIC BLOOD PRESSURE: 128 MMHG | WEIGHT: 231.04 LBS | HEART RATE: 72 BPM | DIASTOLIC BLOOD PRESSURE: 69 MMHG | RESPIRATION RATE: 14 BRPM

## 2021-02-09 LAB
ESTIMATED AVERAGE GLUCOSE: 96.8 MG/DL
GLUCOSE BLD-MCNC: 141 MG/DL (ref 70–99)
HBA1C MFR BLD: 5 %
HCT VFR BLD CALC: 37.2 % (ref 40.5–52.5)
HEMOGLOBIN: 12.9 G/DL (ref 13.5–17.5)
PERFORMED ON: ABNORMAL

## 2021-02-09 PROCEDURE — 97530 THERAPEUTIC ACTIVITIES: CPT

## 2021-02-09 PROCEDURE — 97535 SELF CARE MNGMENT TRAINING: CPT

## 2021-02-09 PROCEDURE — 2580000003 HC RX 258: Performed by: ORTHOPAEDIC SURGERY

## 2021-02-09 PROCEDURE — 85018 HEMOGLOBIN: CPT

## 2021-02-09 PROCEDURE — 6360000002 HC RX W HCPCS: Performed by: ORTHOPAEDIC SURGERY

## 2021-02-09 PROCEDURE — 97116 GAIT TRAINING THERAPY: CPT

## 2021-02-09 PROCEDURE — 6370000000 HC RX 637 (ALT 250 FOR IP): Performed by: ORTHOPAEDIC SURGERY

## 2021-02-09 PROCEDURE — 97110 THERAPEUTIC EXERCISES: CPT

## 2021-02-09 PROCEDURE — 94760 N-INVAS EAR/PLS OXIMETRY 1: CPT

## 2021-02-09 PROCEDURE — 85014 HEMATOCRIT: CPT

## 2021-02-09 PROCEDURE — 36415 COLL VENOUS BLD VENIPUNCTURE: CPT

## 2021-02-09 RX ORDER — CYCLOBENZAPRINE HCL 5 MG
5 TABLET ORAL 3 TIMES DAILY PRN
COMMUNITY

## 2021-02-09 RX ORDER — LISINOPRIL 20 MG/1
20 TABLET ORAL DAILY
COMMUNITY

## 2021-02-09 RX ADMIN — OXYCODONE 5 MG: 5 TABLET ORAL at 03:21

## 2021-02-09 RX ADMIN — INSULIN LISPRO 8 UNITS: 100 INJECTION, SOLUTION INTRAVENOUS; SUBCUTANEOUS at 08:24

## 2021-02-09 RX ADMIN — CEFAZOLIN SODIUM 2000 MG: 10 INJECTION, POWDER, FOR SOLUTION INTRAVENOUS at 02:16

## 2021-02-09 RX ADMIN — OXYCODONE HYDROCHLORIDE 10 MG: 10 TABLET ORAL at 08:22

## 2021-02-09 RX ADMIN — SODIUM CHLORIDE, PRESERVATIVE FREE 10 ML: 5 INJECTION INTRAVENOUS at 08:30

## 2021-02-09 RX ADMIN — ACETAMINOPHEN 650 MG: 325 TABLET ORAL at 08:21

## 2021-02-09 RX ADMIN — LISINOPRIL 10 MG: 10 TABLET ORAL at 08:22

## 2021-02-09 RX ADMIN — FAMOTIDINE 20 MG: 20 TABLET, FILM COATED ORAL at 08:21

## 2021-02-09 RX ADMIN — INSULIN LISPRO 1 UNITS: 100 INJECTION, SOLUTION INTRAVENOUS; SUBCUTANEOUS at 08:22

## 2021-02-09 RX ADMIN — ENOXAPARIN SODIUM 40 MG: 40 INJECTION SUBCUTANEOUS at 08:20

## 2021-02-09 RX ADMIN — BUPROPION HYDROCHLORIDE 150 MG: 150 TABLET, EXTENDED RELEASE ORAL at 08:21

## 2021-02-09 RX ADMIN — ROSUVASTATIN CALCIUM 10 MG: 10 TABLET, FILM COATED ORAL at 08:21

## 2021-02-09 RX ADMIN — DOCUSATE SODIUM 50 MG AND SENNOSIDES 8.6 MG 1 TABLET: 8.6; 5 TABLET, FILM COATED ORAL at 08:21

## 2021-02-09 RX ADMIN — ACETAMINOPHEN 650 MG: 325 TABLET ORAL at 02:16

## 2021-02-09 ASSESSMENT — PAIN DESCRIPTION - DESCRIPTORS
DESCRIPTORS: DISCOMFORT
DESCRIPTORS: DISCOMFORT;ACHING

## 2021-02-09 ASSESSMENT — PAIN SCALES - GENERAL
PAINLEVEL_OUTOF10: 7
PAINLEVEL_OUTOF10: 5
PAINLEVEL_OUTOF10: 0
PAINLEVEL_OUTOF10: 3

## 2021-02-09 ASSESSMENT — PAIN - FUNCTIONAL ASSESSMENT
PAIN_FUNCTIONAL_ASSESSMENT: PREVENTS OR INTERFERES SOME ACTIVE ACTIVITIES AND ADLS

## 2021-02-09 ASSESSMENT — PAIN DESCRIPTION - ORIENTATION: ORIENTATION: LEFT

## 2021-02-09 ASSESSMENT — PAIN DESCRIPTION - PAIN TYPE: TYPE: SURGICAL PAIN

## 2021-02-09 ASSESSMENT — PAIN DESCRIPTION - PROGRESSION: CLINICAL_PROGRESSION: NOT CHANGED

## 2021-02-09 ASSESSMENT — PAIN DESCRIPTION - ONSET: ONSET: ON-GOING

## 2021-02-09 ASSESSMENT — PAIN DESCRIPTION - LOCATION: LOCATION: KNEE

## 2021-02-09 NOTE — PROGRESS NOTES
[facility] Orthopedic Surgery   Progress Note      S/P :  SUBJECTIVE  Up in chair. Alert and oriented. Pain is   described in left knee and with the intensity of moderate. Pain is described as aching. OBJECTIVE              Physical                      VITALS:  /69   Pulse 72   Temp 97.9 °F (36.6 °C) (Oral)   Resp 14   Ht 5' 9\" (1.753 m)   Wt 231 lb 0.7 oz (104.8 kg)   SpO2 97%   BMI 34.12 kg/m²                     MUSCULOSKELETAL:  left foot NVI. Wiggles toes to command. Pedal pulses are palpable. NEUROLOGIC:                                  Sensory:  Touch:  Left Lower Extremity:  normal                                                 Surgical wound appears clean and dry left knee with Prineo. INferior to that is Mepilex over port incision, clean and dry. NAKUL hose on. Ice pack on.      Data       CBC:   Lab Results   Component Value Date    WBC 5.2 01/25/2021    RBC 4.92 01/25/2021    HGB 12.9 02/09/2021    HCT 37.2 02/09/2021    MCV 94.3 01/25/2021    MCH 32.5 01/25/2021    MCHC 34.5 01/25/2021    RDW 12.7 01/25/2021     01/25/2021    MPV 6.3 01/25/2021        WBC:    Lab Results   Component Value Date    WBC 5.2 01/25/2021        Hemoglobin/Hematocrit:    Lab Results   Component Value Date    HGB 12.9 02/09/2021    HCT 37.2 02/09/2021        PT/INR:    Lab Results   Component Value Date    PROTIME 11.8 01/25/2021    INR 1.02 01/25/2021              Current Inpatient Medications             Current Facility-Administered Medications: buPROPion (WELLBUTRIN XL) extended release tablet 150 mg, 150 mg, Oral, QAM  cyclobenzaprine (FLEXERIL) tablet 10 mg, 10 mg, Oral, TID PRN  gabapentin (NEURONTIN) capsule 300 mg, 300 mg, Oral, Nightly  lisinopril (PRINIVIL;ZESTRIL) tablet 10 mg, 10 mg, Oral, Daily  melatonin tablet 6 mg, 6 mg, Oral, Nightly  famotidine (PEPCID) tablet 20 mg, 20 mg, Oral, Daily  rosuvastatin (CRESTOR) tablet 10 mg, 10 mg, Oral, Daily insulin glargine (LANTUS) injection vial 26 Units, 0.25 Units/kg, Subcutaneous, Nightly  insulin lispro (HUMALOG) injection vial 8 Units, 0.08 Units/kg, Subcutaneous, TID WC  insulin lispro (HUMALOG) injection vial 0-6 Units, 0-6 Units, Subcutaneous, TID WC  insulin lispro (HUMALOG) injection vial 0-3 Units, 0-3 Units, Subcutaneous, Nightly  glucose (GLUTOSE) 40 % oral gel 15 g, 15 g, Oral, PRN  dextrose 50 % IV solution, 12.5 g, Intravenous, PRN  glucagon (rDNA) injection 1 mg, 1 mg, Intramuscular, PRN  dextrose 5 % solution, 100 mL/hr, Intravenous, PRN  0.9 % sodium chloride infusion, , Intravenous, Continuous  sodium chloride flush 0.9 % injection 10 mL, 10 mL, Intravenous, 2 times per day  sodium chloride flush 0.9 % injection 10 mL, 10 mL, Intravenous, PRN  acetaminophen (TYLENOL) tablet 650 mg, 650 mg, Oral, Q6H  HYDROmorphone (DILAUDID) injection 0.25 mg, 0.25 mg, Intravenous, Q3H PRN **OR** HYDROmorphone (DILAUDID) injection 0.5 mg, 0.5 mg, Intravenous, Q3H PRN  sennosides-docusate sodium (SENOKOT-S) 8.6-50 MG tablet 1 tablet, 1 tablet, Oral, BID  magnesium hydroxide (MILK OF MAGNESIA) 400 MG/5ML suspension 30 mL, 30 mL, Oral, Daily PRN  oxyCODONE (ROXICODONE) immediate release tablet 5 mg, 5 mg, Oral, Q4H PRN **OR** oxyCODONE HCl (OXY-IR) immediate release tablet 10 mg, 10 mg, Oral, Q4H PRN  promethazine (PHENERGAN) tablet 12.5 mg, 12.5 mg, Oral, Q6H PRN **OR** ondansetron (ZOFRAN) injection 4 mg, 4 mg, Intravenous, Q6H PRN  enoxaparin (LOVENOX) injection 40 mg, 40 mg, Subcutaneous, Daily    ASSESSMENT AND PLAN      Post left TKA, stable exam  DVT prophylaxis ordered, Lovenox as inpt then switch to ASA 81mg twice at day for 14 days for DVT prophylaxis  PT OT for ADL's and ambulation as tolerated  SS for DC planning, home with home care today. Sister is nurse and pt is going to her home for recovery. IV or PO pain med as ordered  Mild elevation in BG requiring insulin protocol.  OK for DC [facility]  2/9/2021  9:41 AM

## 2021-02-09 NOTE — PROGRESS NOTES
Pt resting in bed comfortably this AM. Surgical dressing removed per order. Scant drainage noted, PRINEO in place. Moderate swelling noted, ice packs filled and in place. No needs voiced. Call light in reach. Will continue to monitor.

## 2021-02-09 NOTE — PROGRESS NOTES
The patient is sitting up in a chair postoperative day #1 status post robotic left total knee arthroplasty. He is doing rather well. He reports minimal pain. On examination today, the patient is alert and oriented x3. His incision is clean and dry. He has mild swelling. He is neurovascularly intact distally. There is no evidence of DVT. X-rays: The prosthesis is well aligned. The x-rays look great. Lab Results   Component Value Date    HGB 12.9 (L) 02/09/2021   At this time, the patient may be discharged home later this morning. He will participate in an aggressive physical therapy program.  The patient was inquiring about donating blood for a benefit in 2 weeks. I do feel he may go ahead and proceed with his plan. He will follow-up with me in approximately 2 weeks and we will reassess him then.

## 2021-02-09 NOTE — PROGRESS NOTES
Huddle performed this morning including Nurse navigator Fito Charter, Physical therapist leslie, and Occupational therapist jay. Discussed plan of care, discharge plan, and dme needs if applicable for orthopedic total joint patient.   Electronically signed by Claudell Ferry, RN on 2/9/2021 at 8:14 AM

## 2021-02-09 NOTE — PROGRESS NOTES
Physical Therapy    Facility/Department: [facility] ORTHOPEDICS  Treatment note    NAME: Jennifer Sierra  : 1950  MRN: [id number]    Date of Service: 2021      Assessment / Discharge Recommendations:  -progressing well and ready for home today with family assist  -he will have assist of 2 family members to ascend 3 steps as needed to enter home (will also use cane)  -reviewed car transfers (sister present to observe)  -aarom <10 to 90 degrees  -instructed in initial HEP and general activity to do until Home PT takes charge of care    Body structures, Functions, Activity limitations: Decreased functional mobility ; Decreased ADL status; Decreased ROM; Decreased strength  Activity Tolerance  Activity Tolerance: Patient Tolerated treatment well       Patient Diagnosis(es): The primary encounter diagnosis was Primary osteoarthritis of left knee. A diagnosis of Osteoarthritis of left knee, unspecified osteoarthritis type was also pertinent to this visit. has a past medical history of Acid reflux, Arthritis, Depression, Hyperlipidemia, Hypertension, Meniere disease, Sleep apnea, Wears glasses, and Wears partial dentures. has a past surgical history that includes Cataract removal with implant (Bilateral); other surgical history (Left); Hand Fusion (Bilateral); hernia repair (Bilateral); Colonoscopy; UPPP; orif proximal ulna fracture (Left, 2019); fracture surgery; Colonoscopy (N/A, 2019); Tonsillectomy; eye surgery (Left); Knee cartilage surgery; and Knee Arthroplasty (Left, 2021). Restrictions  Restrictions/Precautions  Restrictions/Precautions: Weight Bearing  Lower Extremity Weight Bearing Restrictions  Left Lower Extremity Weight Bearing: Weight Bearing As Tolerated  Vision/Hearing  Vision: Impaired  Vision Exceptions: Wears glasses at all times  Hearing: Exceptions to [facility]  Hearing Exceptions: Bilateral hearing aid     Subjective  General  Chart Reviewed:  Yes Patient assessed for rehabilitation services?: Yes  Additional Pertinent Hx: here for elective left TKR  Response To Previous Treatment: Patient with no complaints from previous session.   Family / Caregiver Present: Yes(sister)  Follows Commands: Within Functional Limits  Subjective  Subjective: to room following OT session- his sister is here to observe PT session - patient is alert oriented and ready for PT session  Pain Screening  Patient Currently in Pain: (minimal at rest and moderate with movement)  Social/Functional History  Social/Functional History  Lives With: Alone(staying with sister)  Type of Home: (Condo)  Home Layout: One level, Able to Live on Main level with bedroom/bathroom  Home Access: Stairs to enter without rails  Entrance Stairs - Number of Steps: 3  Bathroom Shower/Tub: Walk-in shower  Bathroom Toilet: (RTS)  Bathroom Equipment: Built-in shower seat, Hand-held shower  Home Equipment: Rolling walker  ADL Assistance: Independent  Homemaking Assistance: Independent  Ambulation Assistance: Independent  Transfer Assistance: Independent  Active : Yes  Occupation: Retired  Additional Comments: Informaton above for sister's place he is staying with 2 weeks after surgery  Objective  Bed mobility  Supine to Sit: Supervision  Sit to Supine: Unable to assess  Transfers  Sit to Stand: Modified independent;Supervision  Stand to sit: Modified independent;Supervision  Ambulation  Ambulation?: Yes  Ambulation 1  Surface: level tile  Device: Rolling Walker  Assistance: Supervision  Quality of Gait: step to pattern progressing to partial step through pattern with good heel contact and good weight bearing  Distance: in room and to/from ortho gym for ~120 feet overall  Comments: steady with the walker  Stairs/Curb  Stairs?: (demonstrated steps having 2 assistants and a cane)  Balance  Comments: steady with transfers and ambulation  Exercises  Quad Sets: 10 per hour  Gluteal Sets: 10 per hour Ankle Pumps: 30 per hour  Comments: encouraged continued practice with the spirometer at home for a few more days     Plan   Plan  Times per week: 1-4 sessions  Current Treatment Recommendations: Strengthening, Transfer Training, ROM, ADL/Self-care Training, Patient/Caregiver Education & Training, Modalities, Gait Training, Positioning, Stair training  Safety Devices  Type of devices:  All fall risk precautions in place, Call light within reach, Left in chair, Nurse notified, Chair alarm in place(sister remains in room to supervise)  AM-PAC Score  AM-PAC Inpatient Mobility Raw Score : 17 (02/08/21 1559)  AM-PAC Inpatient T-Scale Score : 42.13 (02/08/21 1559)  Mobility Inpatient CMS 0-100% Score: 50.57 (02/08/21 1559)  Mobility Inpatient CMS G-Code Modifier : CK (02/08/21 1559)  Goals  Short term goals  Time Frame for Short term goals: 1-2 days  Short term goal 1: bed mobility at  [facility] term goal 2: transfers at supervision  Short term goal 3: ambulation at supervision wbat rolling walker for household distances    -steps as needed for home entry at [address] [place] [address] goal 4: exercises at supervision  Patient Goals   Patient goals : relief of chronic knee pain and good function       Therapy Time   Individual Concurrent Group Co-treatment   Time In 0930         Time Out 1010         Minutes 2050 Jericho Paula, PT

## 2021-02-09 NOTE — PROGRESS NOTES
Occupational Therapy  Facility/Department: [address] ORTHOPEDICS  Daily Treatment Note  NAME: Reji Pierre  : 1950  MRN: [id number]    Date of Service: 2021    Discharge Recommendations:  Home with Home health OT, Patient would benefit from continued therapy after discharge, 24 hour supervision or assist, 2-3 sessions per week  OT Equipment Recommendations  Equipment Needed: No    Reji Pierre scored a 20/24 on the AM-PAC ADL Inpatient form. Current research shows that an AM-PAC score of 18 or greater is typically associated with a discharge to the patient's home setting. Based on the patient's AM-PAC score, and their current ADL deficits, it is recommended that the patient have 2-3 sessions per week of Occupational Therapy at d/c to increase the patient's independence. At this time, this patient demonstrates the endurance and safety to discharge home with [facility] and a follow up treatment frequency of 2-3x/wk. Please see assessment section for further patient specific details. If patient discharges prior to next session this note will serve as a discharge summary. Please see below for the latest assessment towards goals. Assessment   Performance deficits / Impairments: Decreased functional mobility ; Decreased safe awareness;Decreased balance;Decreased ADL status; Decreased high-level IADLs Assessment: Pt is a 64 yo male admitted 2/8 for an elective L TKA. PTA, pt lives alone and is independent with ADLs, IADLs, fxl mobility, and driving. Pt will be staying at sisters house at d/c in which pt/sister has all DME needs. Today, pt with mild unsteadiness d/y L high step with household distance with RW, no LOB, and requires moderate cues for hand placement with transfers. Pt has progressed to meeting all goals this session: grooming standing >5 min with sup, toileting + transfer with sup, UB dressing Mod I, tranfsers with CGA and functional mobility with RW with SBA. D/C from acute OT- has met all goals. Rec d/c to sisters home with 24 hr supervision/assist and HHOT 2-3x/week  Treatment Diagnosis: L TKA  Prognosis: Good  OT Education: OT Role;Plan of Care;Transfer Training  Patient Education: LB dressing techniques and transfers  REQUIRES OT FOLLOW UP: No  Activity Tolerance  Activity Tolerance: Patient Tolerated treatment well  Safety Devices  Safety Devices in place: Yes  Type of devices: Nurse notified; Left in chair;Patient at risk for falls;Gait belt;Call light within reach(RN present at [facility])       Patient Diagnosis(es): The primary encounter diagnosis was Primary osteoarthritis of left knee. A diagnosis of Osteoarthritis of left knee, unspecified osteoarthritis type was also pertinent to this visit. has a past medical history of Acid reflux, Arthritis, Depression, Hyperlipidemia, Hypertension, Meniere disease, Sleep apnea, Wears glasses, and Wears partial dentures. has a past surgical history that includes Cataract removal with implant (Bilateral); other surgical history (Left); Hand Fusion (Bilateral); hernia repair (Bilateral); Colonoscopy; UPPP; orif proximal ulna fracture (Left, 5/7/2019); fracture surgery; Colonoscopy (N/A, 5/24/2019); Tonsillectomy; eye surgery (Left); Knee cartilage surgery; and Knee Arthroplasty (Left, 2/8/2021).     Restrictions  Restrictions/Precautions Restrictions/Precautions: Weight Bearing  Lower Extremity Weight Bearing Restrictions  Left Lower Extremity Weight Bearing: Weight Bearing As Tolerated  Subjective   General  Chart Reviewed: Yes, Orders, Progress Notes, History and Physical, Operative Notes  Patient assessed for rehabilitation services?: Yes  Additional Pertinent Hx: Admitted 2/8 elective L TKA  Response to previous treatment: Patient with no complaints from previous session  Family / Caregiver Present: No  Referring Practitioner: Brodie Carlson MD  Diagnosis: Admitted 2/8 elective L TKA  Subjective  Subjective: Pt agreeable to OT session.  Pt reports mild pain on L thigh  General Comment  Comments: RN ok to tx      Orientation  Orientation  Orientation Level: Oriented to place;Oriented to time;Oriented to situation;Oriented to person  Objective    ADL  Grooming: Supervision(standing -oral care)  UE Dressing: Modified independent   LE Dressing: Contact guard assistance(underpants and pants)  Toileting: Supervision  Additional Comments: educated on LB dressing -dressing affected leg first     Balance  Sitting Balance: Supervision  Standing Balance: Stand by assistance  Standing Balance  Time: 5 minutes  Activity: standing at sink for grooming (oral care, deodorant) with SBA with unilateral support  Functional Mobility  Functional - Mobility Device: Rolling Walker  Activity: (bed>restroom>chair)  Assist Level: Stand by assistance  Functional Mobility Comments: pt with unsteady gait, however, no LOB or need for assist. Pt with left high step with gait  Toilet Transfers  Toilet - Technique: Ambulating(RW)  Equipment Used: Raised toilet seat without rails(grab bars)  Toilet Transfer: Stand by assistance  Bed mobility  Supine to Sit: Supervision  Sit to Supine: Unable to assess  Scooting: Supervision  Transfers  Sit to stand: Contact guard assistance  Stand to sit: Stand by assistance Transfer Comments: RW. Cues for hand placement with transfers- moderate carryover throughout session     Coordination  Gross Motor: WFL  Fine Motor: WFL     Cognition  Overall Cognitive Status: WNL     Plan   Plan  Times per week: 0- no further need  Times per day: Daily  Current Treatment Recommendations: Patient/Caregiver Education & Training, Self-Care / ADL, Balance Training, Functional Mobility Training, Safety Education & Training    AM-PAC Score  AM-PAC Inpatient Daily Activity Raw Score: 20 (02/09/21 0840)  AM-PAC Inpatient ADL T-Scale Score : 42.03 (02/09/21 0840)  ADL Inpatient CMS 0-100% Score: 38.32 (02/09/21 0840)  ADL Inpatient CMS G-Code Modifier : Ricci Campos (02/09/21 0840)    Goals  Short term goals  Time Frame for Short term goals: prior to d/c  Short term goal 1: tolerate 5 minute functional standing task with supervision- MET  Short term goal 2: complete toileting with supervision- MET  Short term goal 3: complete ADL transfer with supervision- MET  Short term goal 4: complete LB dressing with Min A -assist from family as needed- MET  Short term goal 5: complete UB dressing with Mod I -MET  Patient Goals   Patient goals : to get better     Therapy Time   Individual Concurrent Group Co-treatment   Time In 0755         Time Out 0835         Minutes 40         Timed Code Treatment Minutes: 25 Minutes     LISSET Rodriguez, OTR/L

## 2021-02-09 NOTE — PROGRESS NOTES
Clinical Pharmacy Note  Medication Counseling    Reviewed new medications started during hospital admission: aspirin, oxycodone, Lantus, Humalog ans senna-s. Indications and side effects were emphasized during counseling. All medication-related questions addressed. Patient verbalized understanding of education. Should the patient express any additional questions or concerns regarding their medications, please do not hesitate to contact the pharmacy department. Patient/caregiver aware they may refuse medications during hospital stay. 10 minutes spent educating patient regarding medications.    JACOBY Estrada [facility]  2/9/2021  9:05 AM

## 2021-02-09 NOTE — PROGRESS NOTES
Data- discharge order received, patient verbalized agreement to discharge, disposition to previous residence, needs noted for [facility] and informed Vlad Valerio NP. Action- discharge instructions prepared and given to patient and sister, patient and sister verbalized understanding. Medication information packet given r/t NEW and/or CHANGED prescriptions emphasizing name/purpose/side effects, pt verbalized understanding. Discharge instruction summary: Diet- general, Activity- wbat, Primary Care Physician as follows: Melissa Headley -[phone]. f/u appointment with orthopedic office noted below, immunizations reviewed and discussed with patient, prescription medications to be filled by retail pharmacy and then delivered. Inpatient surgical procedure precautions reviewed: . Neurovascular check performed and patient is WNLs, denies numbness/tingling in extremties. Incision site  prineo glue dressing assessed and is  clean,dry, and intact, no signs of redness, drainage, or odor noted. mepilex border noted to distal incision site and is dry and intact with old spot of drainage noted. patient's bedside RN [facility] notified of patient completing discharge instructions and iv removal.    Response- IV removed. Medications to be delivered to patient via meds to bed program. Disposition is home with [facility] , to be transported with family, no complications.      Future Appointments   Date Time Provider Thea Pabon   2/23/2021  1:15 PM MD [place]           Electronically signed by Darion Bo RN on 2/9/2021 at 9:20 AM

## 2021-02-09 NOTE — PROGRESS NOTES
Patient is alert & oriented x4, x1 assist with walker, 2/4 bed rails up, bed in lowest position, fall precautions in place, call light within reach. Will cont to monitor and reassess.   Electronically signed by Jose Antonio Flores RN on 2/9/2021 at 8:46 AM

## 2021-02-09 NOTE — PROGRESS NOTES
Pt resting comfortably up in chair. Foot of chair elevated, ice packs in place to surgical knee. Shift assessment and PM medication complete. PRN pain medication given per order. Surgical dressing in place and clean dry and intact. No further needs voiced at this time. Call light in reach. Will continue to monitor.

## 2021-02-09 NOTE — PLAN OF CARE
Goal: Absence of physical injury  Description: Absence of physical injury  2/9/2021 0739 by Francisco Torres RN  Outcome: Ongoing  Note: Pt absent from physical injury on this shift      Problem: Skin Integrity:  Goal: Will show no infection signs and symptoms  Description: Will show no infection signs and symptoms  2/9/2021 0739 by Francisco Torres RN  Outcome: Ongoing  Note: Monitoring pt for signs and symptoms of infection. Will observe for any redness, warmth, or pus. Problem: Skin Integrity:  Goal: Absence of new skin breakdown  Description: Absence of new skin breakdown  2/9/2021 0739 by Francisco Torres RN  Outcome: Ongoing  Note: Pt absent from new skin breakdown      Problem: Pain:  Goal: Pain level will decrease  Description: Pain level will decrease  2/9/2021 0739 by Francisco Torres RN  Outcome: Ongoing  Note: Assessing and monitoring pt's pain. PRN pain medication being given if ordered. Educating pt on nonpharmacologic interventions for pain control. Will continue to monitor and reassess. Problem: Pain:  Goal: Control of acute pain  Description: Control of acute pain  2/9/2021 0739 by Francisco Torres RN  Outcome: Ongoing  Note: Assessing and monitoring pt's pain. PRN pain medication being given if ordered. Educating pt on nonpharmacologic interventions for pain control. Will continue to monitor and reassess. Problem: Pain:  Goal: Control of chronic pain  Description: Control of chronic pain  2/9/2021 0739 by Francisco Torres RN  Outcome: Ongoing  Note: Assessing and monitoring pt's pain. PRN pain medication being given if ordered. Educating pt on nonpharmacologic interventions for pain control. Will continue to monitor and reassess.

## 2021-02-09 NOTE — PLAN OF CARE
Problem: Discharge Planning:  Goal: Discharged to appropriate level of care  Description: Discharged to appropriate level of care  2/9/2021 0026 by Lucia Coleman RN  Note: Pt will be involved in discharging planning. 2/8/2021 1759 by Val Gutiérrez RN  Outcome: Ongoing  Note: Pt will be discharged to the appropriate level of care     Problem: Mobility - Impaired:  Goal: Mobility will improve  Description: Mobility will improve  2/9/2021 0026 by Lucia Coleman RN  Note: Pt mobility is increasing. Pt is SBA x1 with walker. Will monitor. 2/8/2021 1759 by Val Gutiérrez RN  Outcome: Ongoing  Note: Mobility will improve      Problem: Infection - Surgical Site:  Goal: Will show no infection signs and symptoms  Description: Will show no infection signs and symptoms  2/8/2021 1759 by Val Gutiérrez RN  Outcome: Ongoing  Note: Monitoring pt for signs and symptoms of infection. Will observe for any redness, warmth, or pus. Problem: Pain - Acute:  Goal: Pain level will decrease  Description: Pain level will decrease  2/9/2021 0026 by Lucia Coleman RN  Note: Pt will report a decrease in pain this shift. Pain will be assessed frequently and treated per order. 2/8/2021 1759 by Val Gutiérrez RN  Outcome: Ongoing  Note: Assessing and monitoring pt's pain. PRN pain medication being given if ordered. Educating pt on nonpharmacologic interventions for pain control. Will continue to monitor and reassess. Problem: Falls - Risk of:  Goal: Will remain free from falls  Description: Will remain free from falls  2/9/2021 0026 by Lucia Coleman RN  Note: Pt remains free from falls this shift. Fall precautions in place. Will continue to monitor.     2/8/2021 1759 by Val Gutiérrez RN  Outcome: Ongoing Note: Fall risk assessment completed. Fall precautions in place. Bed in lowest position, wheels locked, bed/chair exit alarm in place, call light within reach, and non skid footwear on. Walkway free of clutter. Pt alert and oriented and able to make needs known. Pt educated to use call light when needing to get up, and pt utilizes call light to make needs known. Will continue to monitor. Goal: Absence of physical injury  Description: Absence of physical injury  2/8/2021 1759 by Ngozi Sparks RN  Outcome: Ongoing  Note: Pt absent from physical injury on this shift      Problem: Skin Integrity:  Goal: Will show no infection signs and symptoms  Description: Will show no infection signs and symptoms  2/9/2021 0026 by Ramin Kwok RN  Note: Pt will show no signs or symptoms of infection this shift. 2/8/2021 1759 by Ngozi Sparks RN  Outcome: Ongoing  Note: Monitoring pt for signs and symptoms of infection. Will observe for any redness, warmth, or pus.      Goal: Absence of new skin breakdown  Description: Absence of new skin breakdown  2/8/2021 1759 by Ngozi Sparks RN  Outcome: Ongoing  Note: Pt absent from new skin breakdown on this shift

## 2021-02-11 ENCOUNTER — TELEPHONE (OUTPATIENT)
Dept: ORTHOPEDICS UNIT | Age: 71
End: 2021-02-11

## 2021-02-11 NOTE — TELEPHONE ENCOUNTER
Attempted to contact patient. Left hippa compliant voicemail for patient stating purpose and call back number.    Santi Stern  Orthopedic Nurse Navigator  Phone number: [phone]  Future Appointments   Date Time Provider Thea Pabon   2/23/2021  1:15 PM MD Flower Lopes       Electronically signed by Brittani Ballard RN on 2/11/2021 at 2:41 PM

## 2021-02-17 ENCOUNTER — TELEPHONE (OUTPATIENT)
Dept: ORTHOPEDIC SURGERY | Age: 71
End: 2021-02-17

## 2021-02-17 DIAGNOSIS — M17.12 PRIMARY OSTEOARTHRITIS OF LEFT KNEE: ICD-10-CM

## 2021-02-17 DIAGNOSIS — Z96.652 S/P TOTAL KNEE ARTHROPLASTY, LEFT: Primary | ICD-10-CM

## 2021-02-17 RX ORDER — OXYCODONE HYDROCHLORIDE 5 MG/1
5-10 TABLET ORAL EVERY 6 HOURS PRN
Qty: 40 TABLET | Refills: 0 | Status: SHIPPED | OUTPATIENT
Start: 2021-02-17 | End: 2021-03-10 | Stop reason: SDUPTHER

## 2021-02-17 NOTE — TELEPHONE ENCOUNTER
PATIENT IS NEEDING A REFILL ON HIS OXYCODONE. HE CAN BE REACHED [phone]. Thelma Blackwell: [phone].

## 2021-02-23 ENCOUNTER — OFFICE VISIT (OUTPATIENT)
Dept: ORTHOPEDIC SURGERY | Age: 71
End: 2021-02-23

## 2021-02-23 VITALS — TEMPERATURE: 96.6 F | WEIGHT: 231 LBS | HEIGHT: 69 IN | BODY MASS INDEX: 34.21 KG/M2

## 2021-02-23 DIAGNOSIS — Z96.652 S/P TOTAL KNEE ARTHROPLASTY, LEFT: Primary | ICD-10-CM

## 2021-02-23 PROCEDURE — 99024 POSTOP FOLLOW-UP VISIT: CPT | Performed by: ORTHOPAEDIC SURGERY

## 2021-02-23 NOTE — PROGRESS NOTES
Neo Duke  <[id number]>  February 23, 2021    Chief Complaint   Patient presents with   [facility] Post-Op Check     2/8/21 L TKA             History: The patient is here in follow-up regarding his left knee. He is now approximately 2 weeks status post left total knee arthroplasty. He feels that he is progressing extremely well. He denies any numbness or tingling. He reports mild to moderate pain. He is ambulating with a cane. The patient's  past medical history, medications, allergies,  family history, social history, and review of systems have been reviewed, and dated and are recorded in the chart. Temp 96.6 °F (35.9 °C)   Ht 5' 9\" (1.753 m)   Wt 231 lb (104.8 kg)   BMI 34.11 kg/m²     Physical: Mr. Neo Duke appears well, he is in no apparent distress, he demonstrates appropriate mood & affect. He is alert and oriented to person, place and time. He has mild swelling. There is No evidence of DVT seen on physical exam.. He is neurovascularly intact distally. Range of motion is from -8 degrees to 110 degrees. The incision is  clean, dry and intact and without erythema. Strength in the knee is 4/5. There is no instability with varus and valgus stressing of the knee. There is no pain with range of motion of the hips. Xrays: Three views of the left knee were obtained and reviewed. The prosthesis is well aligned and there is no evidence of loosening. Impression: Status post left Total Knee Arthroplasty, Doing well postoperatively. Plan:  He will continue to work aggressively on range of motion and strengthening: Natural history and expected course discussed. Questions answered. Quad strengthening exercises. The patient will gradually transition to an outpatient physical therapy program. The patient will follow up with me in 4 weeks. We instructed the patient to work aggressively on his extension. If he is not regaining his extension, we will need to consider an extension brace.

## 2021-03-02 ENCOUNTER — HOSPITAL ENCOUNTER (OUTPATIENT)
Dept: PHYSICAL THERAPY | Age: 71
Setting detail: THERAPIES SERIES
Discharge: HOME | End: 2021-03-02
Payer: MEDICARE

## 2021-03-02 PROCEDURE — 97140 MANUAL THERAPY 1/> REGIONS: CPT

## 2021-03-02 PROCEDURE — 97161 PT EVAL LOW COMPLEX 20 MIN: CPT

## 2021-03-02 PROCEDURE — 97110 THERAPEUTIC EXERCISES: CPT

## 2021-03-02 NOTE — PLAN OF CARE
[facility] [place]. [address]  Phone: [phone]   Fax:     [phone]                                                       Physical Therapy Certification    Dear Referring Practitioner: Dr. Morris Smith,    We had the pleasure of evaluating the following patient for physical therapy services at [facility]. A summary of our findings can be found in the initial assessment below. This includes our plan of care. If you have any questions or concerns regarding these findings, please do not hesitate to contact me at the office phone number checked above. Thank you for the referral.       Physician Signature:_______________________________Date:__________________  By signing above (or electronic signature), therapists plan is approved by physician          Patient: Marivel Barillas   : 1950   MRN: [id number]  Referring Physician: Referring Practitioner: Dr. Morris Smith      Evaluation Date: 3/2/2021      Medical Diagnosis Information:  Diagnosis: S/P total knee arthroplasty, left  - Primary   Treatment Diagnosis: decreased abilty to ambulate and function                                         Insurance information: PT Insurance Information: [facility] Medicare Advantage     Precautions/ Contra-indications:   Latex Allergy:  [x]NO      []YES  Preferred Language for Healthcare:   [x]English       []other:    C-SSRS Triggered by Intake questionnaire (Past 2 wk assessment ):   [x] No, Questionnaire did not trigger screening.   [] Yes, Patient intake triggered C-SSRS Screening      [] C-SSRS Screening completed  [] PCP notified via Epic     SUBJECTIVE: Patient is a 64 y/o male with a hx of left knee pain with a resultant left tka on 21. He was seen for a few weeks at home. He is ambulating with a cane at this time.   He c/o constant aching pain in his left knee which is worse with steps, prolonged walking, and attempted squatting. He is retired. He hopes to return to walking and stretching. Relevant Medical History:Additional Pertinent Hx: Meniere's Disease,htn, hyperlipedemia, Depression, oa  Functional Outcome Measure: LEFS = 26    Pain Scale: 4-6/10  Easing factors: rest  Provocative factors: weight bearing    Type: [x]Constant   []Intermittent  []Radiating []Localized []other:     hAMS- -35 IN 90+90    Occupation/School:retired    Living Status/Prior Level of Function: Independent with ADLs and IADLs,     OBJECTIVE:     Posture: good    Functional Mobility/Transfers: ind    Palpation: increased edema noted in entire lower leg, VERY TIGHT IN GASTROC, ITB, ADD, HAMS, severe decrease in patellar mob all directions    Bandages/Dressings/Incisions: still healing, no signs of infection    Gait: - ambulates with a straight cane, decreased heel strike, stance, and push off., mild to mod limp without cane. ROM LEFT RIGHT   HIP Flex 90 wfl     HIP Abd 25    HIP Ext 10    HIP IR 20    HIP ER 30    Knee ext -15    Knee Flex 95    Ankle PF     Ankle DF 5    Ankle In     Ankle Ev     Strength  LEFT RIGHT   HIP Flexors 4    HIP Abductors 4    HIP Ext 4    Hip ER 4    Knee EXT (quad) 3+    Knee Flex (HS) 3+    Ankle DF 4    Ankle PF 4    Ankle Inv 4    Ankle EV 4         Circumference  Mid apex  7 cm prox     47/49        Reflexes/Sensation:    [x]Dermatomes/Myotomes intact    [x]Reflexes equal and normal bilaterally   []Other:    Joint mobility:    []Normal    [x]Hypo   []Hyper                           [x] Patient history, allergies, meds reviewed. Medical chart reviewed. See intake form. Review Of Systems (ROS):  [x]Performed Review of systems (Integumentary, CardioPulmonary, Neurological) by intake and observation. Intake form has been scanned into medical record.  Patient has been instructed to contact their primary care physician regarding ROS issues if not already being addressed at this time. Co-morbidities/Complexities (which will affect course of rehabilitation):   []None           Arthritic conditions   []Rheumatoid arthritis (M05.9)  [x]Osteoarthritis (M19.91)   Cardiovascular conditions   [x]Hypertension (I10)  [x]Hyperlipidemia (E78.5)  []Angina pectoris (I20)  []Atherosclerosis (I70)  []CVA Musculoskeletal conditions   []Disc pathology   []Congenital spine pathologies   [x]Prior surgical intervention  []Osteoporosis (M81.8)  []Osteopenia (M85.8)   Endocrine conditions   []Hypothyroid (E03.9)  []Hyperthyroid Gastrointestinal conditions   []Constipation (W86.09)   Metabolic conditions   []Morbid obesity (E66.01)  []Diabetes type 1(E10.65) or 2 (E11.65)   []Neuropathy (G60.9)     Pulmonary conditions   []Asthma (J45)  []Coughing   []COPD (J44.9)   Psychological Disorders  []Anxiety (F41.9)  [x]Depression (F32.9)   []Other:   [x]Other:     menieres disease     Barriers to/and or personal factors that will affect rehab potential:              []Age  []Sex    []Smoker              []Motivation/Lack of Motivation                        [x]Co-Morbidities              []Cognitive Function, education/learning barriers              []Environmental, home barriers              []profession/work barriers  []past PT/medical experience  []other:  Justification:     Falls Risk Assessment (30 days):   [x] Falls Risk assessed and no intervention required.   [] Falls Risk assessed and Patient requires intervention due to being higher risk   TUG score (>12s at risk):     [] Falls education provided, including         ASSESSMENT:   Functional Impairments:     []Noted lumbar/proximal hip/LE hypomobility   [x]Decreased LE functional ROM   [x]Decreased core/proximal hip strength and neuromuscular control   [x]Decreased LE functional strength   [x]Reduced balance/proprioceptive control   []other:      Functional Activity Limitations (from functional questionnaire and intake)   [x]Reduced ability to tolerate prolonged functional positions   [x]Reduced ability or difficulty with changes of positions or transfers between positions   []Reduced ability to maintain good posture and demonstrate good body mechanics with sitting, bending, and lifting   [x]Reduced ability to sleep   [x] Reduced ability or tolerance with driving and/or computer work   [x]Reduced ability to perform lifting, carrying tasks   [x]Reduced ability to squat   []Reduced ability to forward bend   [x]Reduced ability to ambulate prolonged functional periods/distances/surfaces   [x]Reduced ability to ascend/descend stairs   [x]Reduced ability to run, hop or jump   []other:     Participation Restrictions   [x]Reduced participation in self care activities   [x]Reduced participation in home management activities   [x]Reduced participation in work activities   [x]Reduced participation in social activities. [x]Reduced participation in sport activities. Classification :    [x]Signs/symptoms consistent with post-surgical status including decreased ROM, strength and function.    []Signs/symptoms consistent with joint sprain/strain   []Signs/symptoms consistent with patella-femoral syndrome   [x]Signs/symptoms consistent with knee OA/hip OA   []Signs/symptoms consistent with internal derangement of knee/Hip   []Signs/symptoms consistent with functional hip weakness/NMR control      []Signs/symptoms consistent with tendinitis/tendinosis    []signs/symptoms consistent with pathology which may benefit from Dry needling      []other:      Prognosis/Rehab Potential:      []Excellent   [x]Good    []Fair   []Poor    Tolerance of evaluation/treatment:    []Excellent   [x]Good    []Fair   []Poor    Physical Therapy Evaluation Complexity Justification  [x] A history of present problem with:  [] no personal factors and/or comorbidities that impact the plan of care;  [x]1-2 personal factors and/or comorbidities that impact the plan of care  []3 personal factors and/or comorbidities that impact the plan of care  [x] An examination of body systems using standardized tests and measures addressing any of the following: body structures and functions (impairments), activity limitations, and/or participation restrictions;:  [] a total of 1-2 or more elements   [] a total of 3 or more elements   [x] a total of 4 or more elements   [x] A clinical presentation with:  [x] stable and/or uncomplicated characteristics   [] evolving clinical presentation with changing characteristics  [] unstable and unpredictable characteristics;   [x] Clinical decision making of [] low, [] moderate, [] high complexity using standardized patient assessment instrument and/or measurable assessment of functional outcome. [x] EVAL (LOW) 29067 (typically 20 minutes face-to-face)  [] EVAL (MOD) 69076 (typically 30 minutes face-to-face)  [] EVAL (HIGH) 79445 (typically 45 minutes face-to-face)  [] RE-EVAL     PLAN:  Frequency/Duration: 1-3 days per week for 6-8 Weeks:  Interventions:  []  Therapeutic exercise including: strength training, ROM, for Lower extremity and core   []  NMR activation and proprioception for LE, Glutes and Core   []  Manual therapy as indicated for LE, Hip and spine to include: Dry Needling/IASTM, STM, PROM, Gr I-IV mobilizations, manipulation. [] Modalities as needed that may include: thermal agents, E-stim, Biofeedback, US, iontophoresis as indicated  [] Patient education on joint protection, postural re-education, activity modification, progression of HEP. HEP instruction: (see scanned forms)    GOALS:  Patient stated goal: \" I want to walk and function better \"  [] Progressing: [] Met: [] Not Met: [] Adjusted    Therapist goals for Patient:   Short Term Goals: To be achieved in: 2 weeks  1. Independent in HEP and progression per patient tolerance, in order to prevent re-injury. [] Progressing: [] Met: [] Not Met: [] Adjusted  2.  Patient will have a decrease in pain

## 2021-03-02 NOTE — FLOWSHEET NOTE
[facility] - [facility]  [address] [facility]. [address]  Phone: [phone]   Fax:     [phone]      Physical Therapy Treatment Note/ Progress Report:     Date:  3/2/2021    Patient Name:  Darlyn Freire    :  1950  MRN: [id number]    Pertinent Medical History:Additional Pertinent Hx: Meniere's Disease,htn, hyperlipedemia, Depression, oa    Medical/Treatment Diagnosis Information:  · Diagnosis: S/P total knee arthroplasty, left  - Primary  · Treatment Diagnosis: decreased abilty to ambulate and function    Insurance/Certification information:  PT Insurance Information: [facility] Medicare Advantage  Physician Information:  Referring Practitioner: Dr. Mark Zamora of care signed (Y/N):     Date of Patient follow up with Physician:      Progress Report: []  Yes  [x]  No     Date Range for reporting period:  Beginning:  3/2/2021  Ending:      Progress report due (10 Rx/or 30 days whichever is less): 99    Recertification due (POC duration/ or 90 days whichever is less):      Visit # POC/Insurance Allowable Auth Needed   1 20 []Yes   []No     Latex Allergy:  [x]NO      []YES  Preferred Language for Healthcare:   [x]English       []Other:    Functional Scale:      Date assessed: at eval  Test: LEFS-26  Score:    Pain level:  4-6/10     History of Injury: Patient is a 64 y/o male with a hx of left knee pain with a resultant left tka on 21. He was seen for a few weeks at home. He is ambulating with a cane at this time. He c/o constant aching pain in his left knee which is worse with steps, prolonged walking, and attempted squatting. He is retired.   He hopes to return to walking and stretching.         SUBJECTIVE:  Pt states, \" It seems to be doing fairly well \"    OBJECTIVE:   Initial- flex-90,  Ext--15, strength-3+/5    RESTRICTIONS/PRECAUTIONS:    Exercises/Interventions:     Therapeutic Ex (77092)   Min: Reps/Resistance Notes/CUES   Nu step     Leg press     Heel slides     clams     Prone flex     Ext stretch     Qs with towel roll under heel                                        Manual Intervention (30811)  Min:     Knee mobs/PROM     Tib/Fem Mobs     Patella Mobs     Ankle mobs               NMR re-education (46155)  Min:  CUES NEEDED   wobble     Step ups     Step taps     bosu/ski     Semi squat     sls               Therapeutic Activity (27655)  Min:               Modalities  Min:     IFC with      CP after exercises     MH after exercises            Other Therapeutic Activities: Pt was educated on PT POC, Diagnosis, Prognosis, pathomechanics as well as frequency and duration of scheduling future physical therapy appointments. Time was also taken on this day to answer all patient questions and participation in PT. Reviewed appointment policy in detail with patient and patient verbalized understanding. Home Exercise Program: Patient was instructed in the following for HEP:     . Patient verbalized/demonstrated understanding and was issued written handout.   Seated hamstring Stretch 30 sec x 5  Seated Calf Stretch           30 sec x 5  Seated Flexion Stretch      30 sec x 5  Seated Heel Slide  x30  Long Arc Quad      x 30  Quad Set              x 30  Prone Flexion       x 20  Straight Leg Raise   x 20  Glut Set                  x 20  Ankle Pump          x 20  Semi Squat         x 20        Therapeutic Exercise and NMR EXR  [] (09977) Provided verbal/tactile cueing for activities related to strengthening, flexibility, endurance, ROM for improvements in LE, proximal hip, and core control with self care, mobility, lifting, ambulation.  [] (89554) Provided verbal/tactile cueing for activities related to improving balance, coordination, kinesthetic sense, posture, motor skill, proprioception  to assist with LE, proximal hip, and core control in self care, mobility, lifting, ambulation and eccentric single leg Ultrasound (08480) x  [] TA (65049) x     [] Mech Traction (80923)  [] ES(attended) (01756)     [] ES (un) (31975):   [] Vasopump (93212) [] Ionto (34783)   [] Other:    GOALS:  Patient stated goal: \" I want to walk and function better \"  []? Progressing: []? Met: []? Not Met: []? Adjusted     Therapist goals for Patient:   Short Term Goals: To be achieved in: 2 weeks  1. Independent in HEP and progression per patient tolerance, in order to prevent re-injury. []? Progressing: []? Met: []? Not Met: []? Adjusted  2. Patient will have a decrease in pain to facilitate improvement in movement, function, and ADLs as indicated by Functional Deficits. []? Progressing: []? Met: []? Not Met: []? Adjusted     Long Term Goals: To be achieved in:8 weeks  1. Disability index score of25% or less for the LEFS to assist with reaching prior level of function. []? Progressing: []? Met: []? Not Met: []? Adjusted  2. Patient will demonstrate increased AROM to-5-120 to allow for proper joint functioning as indicated by patients Functional Deficits. []? Progressing: []? Met: []? Not Met: []? Adjusted  3. Patient will demonstrate an increase in Strength to good proximal hip strength and control5-/5[]? Progressing: []? Met: []? Not Met: []? Adjusted  4. Patient will return to 75%  functional activities without increased symptoms or restriction. []? Progressing: []? Met: []? Not Met: []? Adjusted  5. (patient specific functional goal)    []? Progressing: []? Met: []? Not Met: []? Adjusted            ASSESSMENT:  See eval    Treatment/Activity Tolerance:  [x] Patient tolerated treatment well [] Patient limited by fatique  [] Patient limited by pain  [] Patient limited by other medical complications  [] Other:     Overall Progression Towards Functional goals/ Treatment Progress Update:  [] Patient is progressing as expected towards functional goals listed. [] Progression is slowed due to complexities/Impairments listed.   [] Progression has been slowed due to co-morbidities. [x] Plan just implemented, too soon to assess goals progression <30days   [] Goals require adjustment due to lack of progress  [] Patient is not progressing as expected and requires additional follow up with physician  [] Other    Prognosis for POC: [x] Good [] Fair  [] Poor    Patient requires continued skilled intervention: [x] Yes  [] No        PLAN: LE arom, prom  strength, proprioception, gait, balance, functional activities. Mfr, joint mobs, mods as needed, hep. Progress as tolerated, very tight in hams and gastroc, ;lacking extension    [] Continue per plan of care [] Alter current plan (see comments)  [x] Plan of care initiated [] Hold pending MD visit [] Discharge    Electronically signed by: Risa Mendoza PT    Note: If patient does not return for scheduled/recommended follow up visits, this note will serve as a discharge from care along with the most recent update on progress.

## 2021-03-04 ENCOUNTER — HOSPITAL ENCOUNTER (OUTPATIENT)
Dept: PHYSICAL THERAPY | Age: 71
Setting detail: THERAPIES SERIES
Discharge: HOME | End: 2021-03-04
Payer: MEDICARE

## 2021-03-04 PROCEDURE — 97140 MANUAL THERAPY 1/> REGIONS: CPT

## 2021-03-04 PROCEDURE — 97110 THERAPEUTIC EXERCISES: CPT

## 2021-03-04 NOTE — FLOWSHEET NOTE
[facility] - Outpatient Rehabilitation & Therapy  [address] [address]  Phone: [phone]   Fax:     [phone]      Physical Therapy Treatment Note/ Progress Report:     Date:  3/4/2021    Patient Name:  Reji Pierre    :  1950  MRN: [id number]    Pertinent Medical History:     Patient Name:  Reji Pierre                         :  1950                     MRN: [id number]     Pertinent Medical History:Additional Pertinent Hx: Meniere's Disease,htn, hyperlipedemia, Depression, oa     Medical/Treatment Diagnosis Information:  · Diagnosis: S/P total knee arthroplasty, left  - Primary  · Treatment Diagnosis: decreased abilty to ambulate and function     Insurance/Certification information:  PT Insurance Information: [facility] Medicare Advantage  Physician Information:  Referring Practitioner: Dr. Kamari Harris of care signed (Y/N):         Progress Report: []  Yes  [x]  No     Date Range for reporting period:  Beginning:  3/4/2021  Ending:      Progress report due (10 Rx/or 30 days whichever is less): 51    Recertification due (POC duration/ or 90 days whichever is less):      Visit # POC/Insurance Allowable Auth Needed   2 20 []Yes   []No     Latex Allergy:  [x]NO      []YES  Preferred Language for Healthcare:   [x]English       []Other:    Functional Scale:      Date assessed: at eval  Test: LEFS-26  Score:    Pain level:  4-6/10     History of Injury: Patient is a 64 y/o male with a hx of left knee pain with a resultant left tka on 21. He was seen for a few weeks at home. He is ambulating with a cane at this time. He c/o constant aching pain in his left knee which is worse with steps, prolonged walking, and attempted squatting. He is retired.   He hopes to return to walking and stretching.         SUBJECTIVE:  Pt states, \" It seems to be doing fairly well \"  3/4/21  Pt states, \" doing pretty good today \"    OBJECTIVE:   Initial- flex-90,  Ext--15, strength-3+/5    RESTRICTIONS/PRECAUTIONS:    Exercises/Interventions:     Therapeutic Ex (56083)   Min: Reps/Resistance Notes/CUES   Nu step L3 x 6 min    Leg press 75# x 40    Heel slides X 2 min , 30 x 3 stretch    clams     Prone flex X 30,  30 x 3     Ext stretch     Qs with towel roll under heel X 2 min    saq/laq X 2 min ea    Standing hams stretch 60 x 3    incline 60 x 3    Standing qs X 2 min                        Manual Intervention (63770)  Min:     Knee mobs/PROM Mfr to quads, hams gastroc, itb, post tib,  Very tight in hams and gastroc as well as itb x 30 min    Tib/Fem Mobs     Patella Mobs     Ankle mobs               NMR re-education (66483)  Min:  CUES NEEDED   wobble     Step ups 4\" x 30    Step taps     bosu/ski     Semi squat     sls               Therapeutic Activity (02563)  Min:      Went over gait with cane trying to reach full ext         Modalities  Min:     IFC with      CP after exercises     MH after exercises            Other Therapeutic Activities: Pt was educated on PT POC, Diagnosis, Prognosis, pathomechanics as well as frequency and duration of scheduling future physical therapy appointments. Time was also taken on this day to answer all patient questions and participation in PT. Reviewed appointment policy in detail with patient and patient verbalized understanding. Home Exercise Program: Patient was instructed in the following for HEP:     . Patient verbalized/demonstrated understanding and was issued written handout.   Seated hamstring Stretch 30 sec x 5  Seated Calf Stretch           30 sec x 5  Seated Flexion Stretch      30 sec x 5  Seated Heel Slide  x30  Long Arc Quad      x 30  Quad Set              x 30  Prone Flexion       x 20  Straight Leg Raise   x 20  Glut Set                  x 20  Ankle Pump          x 20  Semi Squat         x 20        Therapeutic Exercise and NMR EXR  [] (74712) Provided verbal/tactile cueing for mobility, lifting and ambulation. Charges:  Timed Code Treatment Minutes: 60   Total Treatment Minutes: 65      [] EVAL (LOW) 69482 (typically 20 minutes face-to-face)  [] EVAL (MOD) 17605 (typically 30 minutes face-to-face)  [] EVAL (HIGH) 71651 (typically 45 minutes face-to-face)  [] RE-EVAL     [x] LX(08185) x2  [] Dry needle 1 or 2 Muscles (31471)  [] NMR (62847) x     [] Dry needle 3+ Muscles (05618)  [x] Manual (29941) x  2  [] Ultrasound (16021) x  [] TA (78566) x     [] Mech Traction (13613)  [] ES(attended) (34659)     [] ES (un) (41268):   [] Vasopump (77809) [] Ionto (41153)   [] Other:    GOALS:  Patient stated goal: \" I want to walk and function better \"  []? Progressing: []? Met: []? Not Met: []? Adjusted     Therapist goals for Patient:   Short Term Goals: To be achieved in: 2 weeks  1. Independent in HEP and progression per patient tolerance, in order to prevent re-injury. []? Progressing: []? Met: []? Not Met: []? Adjusted  2. Patient will have a decrease in pain to facilitate improvement in movement, function, and ADLs as indicated by Functional Deficits. []? Progressing: []? Met: []? Not Met: []? Adjusted     Long Term Goals: To be achieved in:8 weeks  1. Disability index score of25% or less for the LEFS to assist with reaching prior level of function. []? Progressing: []? Met: []? Not Met: []? Adjusted  2. Patient will demonstrate increased AROM to-5-120 to allow for proper joint functioning as indicated by patients Functional Deficits. []? Progressing: []? Met: []? Not Met: []? Adjusted  3. Patient will demonstrate an increase in Strength to good proximal hip strength and control5-/5[]? Progressing: []? Met: []? Not Met: []? Adjusted  4. Patient will return to 75%  functional activities without increased symptoms or restriction. []? Progressing: []? Met: []? Not Met: []? Adjusted  5. (patient specific functional goal)    []? Progressing: []? Met: []? Not Met: []?  Adjusted      ASSESSMENT:  See eval    Treatment/Activity Tolerance:  [x] Patient tolerated treatment well [] Patient limited by fatique  [] Patient limited by pain  [] Patient limited by other medical complications  [] Other:     Overall Progression Towards Functional goals/ Treatment Progress Update:  [] Patient is progressing as expected towards functional goals listed. [] Progression is slowed due to complexities/Impairments listed. [] Progression has been slowed due to co-morbidities. [x] Plan just implemented, too soon to assess goals progression <30days   [] Goals require adjustment due to lack of progress  [] Patient is not progressing as expected and requires additional follow up with physician  [] Other    Prognosis for POC: [x] Good [] Fair  [] Poor    Patient requires continued skilled intervention: [x] Yes  [] No        PLAN: LE arom, prom  strength, proprioception, gait, balance, functional activities. Mfr, joint mobs, mods as needed, hep. Progress as tolerated, very tight in hams and gastroc, ;lacking extension    [] Continue per plan of care [] Alter current plan (see comments)  [x] Plan of care initiated [] Hold pending MD visit [] Discharge    Electronically signed by: Rosana Griffiths PT    Note: If patient does not return for scheduled/recommended follow up visits, this note will serve as a discharge from care along with the most recent update on progress.

## 2021-03-09 ENCOUNTER — HOSPITAL ENCOUNTER (OUTPATIENT)
Dept: PHYSICAL THERAPY | Age: 71
Setting detail: THERAPIES SERIES
Discharge: HOME | End: 2021-03-09
Payer: MEDICARE

## 2021-03-09 DIAGNOSIS — Z96.652 S/P TOTAL KNEE ARTHROPLASTY, LEFT: ICD-10-CM

## 2021-03-09 PROCEDURE — 97140 MANUAL THERAPY 1/> REGIONS: CPT

## 2021-03-09 PROCEDURE — 97110 THERAPEUTIC EXERCISES: CPT

## 2021-03-09 NOTE — TELEPHONE ENCOUNTER
Called and spoke with patient informed this would be addressed with Dr. Carolyn Nina when he returns to office tomorrow. Patient in agreement with plan.

## 2021-03-09 NOTE — FLOWSHEET NOTE
\"  03/09/21 Patient reports knee is improving,still some stiffness and swelling is about the same. OBJECTIVE:   Initial- flex-90,  Ext--15, strength-3+/5   03/09/21 flex 108  Ext -12    RESTRICTIONS/PRECAUTIONS:    Exercises/Interventions:     Therapeutic Ex (85063)   Min: Reps/Resistance Notes/CUES   Nu step L3 x 6 min    Leg press 75# x 40    Heel slides X 2 min , 30 x 3 stretch    clams     Prone flex X 30,  30 x 3     Ext stretch     Qs with towel roll under heel X 2 min    saq/laq X 2 min ea    Standing hams stretch 60 x 3    incline 60 x 3    Standing qs X 2 min                        Manual Intervention (29635)  Min:     Knee mobs/PROM Mfr to quads, hams gastroc, itb, post tib,  Very tight in hams and gastroc as well as itb x 30 min    Tib/Fem Mobs     Patella Mobs     Ankle mobs               NMR re-education (06289)  Min:  CUES NEEDED   wobble 2 min    Step ups 4\" x 30    Step taps     bosu/ski     Semi squat     sls               Therapeutic Activity (40001)  Min:      Went over gait with cane trying to reach full ext         Modalities  Min:     IFC with      CP after exercises     MH after exercises            Other Therapeutic Activities: Pt was educated on PT POC, Diagnosis, Prognosis, pathomechanics as well as frequency and duration of scheduling future physical therapy appointments. Time was also taken on this day to answer all patient questions and participation in PT. Reviewed appointment policy in detail with patient and patient verbalized understanding. Home Exercise Program: Patient was instructed in the following for HEP:     . Patient verbalized/demonstrated understanding and was issued written handout.   Seated hamstring Stretch 30 sec x 5  Seated Calf Stretch           30 sec x 5  Seated Flexion Stretch      30 sec x 5  Seated Heel Slide  x30  Long Arc Quad      x 30  Quad Set              x 30  Prone Flexion       x 20  Straight Leg Raise   x 20  Glut Set                  x 20  Ankle Pump          x 20  Semi Squat         x 20        Therapeutic Exercise and NMR EXR  [] (64413) Provided verbal/tactile cueing for activities related to strengthening, flexibility, endurance, ROM for improvements in LE, proximal hip, and core control with self care, mobility, lifting, ambulation.  [] (56469) Provided verbal/tactile cueing for activities related to improving balance, coordination, kinesthetic sense, posture, motor skill, proprioception  to assist with LE, proximal hip, and core control in self care, mobility, lifting, ambulation and eccentric single leg control.      NMR and Therapeutic Activities:    [] (35320 or 09695) Provided verbal/tactile cueing for activities related to improving balance, coordination, kinesthetic sense, posture, motor skill, proprioception and motor activation to allow for proper function of core, proximal hip and LE with self care and ADLs and functional mobility.   [] (48157) Gait Re-education- Provided training and instruction to the patient for proper LE, core and proximal hip recruitment and positioning and eccentric body weight control with ambulation re-education including up and down stairs     Home Exercise Program:    [x] (06639) Reviewed/Progressed HEP activities related to strengthening, flexibility, endurance, ROM of core, proximal hip and LE for functional self-care, mobility, lifting and ambulation/stair navigation   [] (66128)Reviewed/Progressed HEP activities related to improving balance, coordination, kinesthetic sense, posture, motor skill, proprioception of core, proximal hip and LE for self care, mobility, lifting, and ambulation/stair navigation      Manual Treatments:  PROM / STM / Oscillations-Mobs:  G-I, II, III, IV (PA's, Inf., Post.)  [] (76557) Provided manual therapy to mobilize LE, proximal hip and/or LS spine soft tissue/joints for the purpose of modulating pain, promoting relaxation,  increasing ROM, reducing/eliminating soft tissue swelling/inflammation/restriction, improving soft tissue extensibility and allowing for proper ROM for normal function with self care, mobility, lifting and ambulation. Charges:  Timed Code Treatment Minutes: 60   Total Treatment Minutes: 65      [] EVAL (LOW) 24161 (typically 20 minutes face-to-face)  [] EVAL (MOD) 97772 (typically 30 minutes face-to-face)  [] EVAL (HIGH) 32102 (typically 45 minutes face-to-face)  [] RE-EVAL     [x] WM(22716) x2  [] Dry needle 1 or 2 Muscles (12774)  [] NMR (38285) x     [] Dry needle 3+ Muscles (13484)  [x] Manual (17957) x  2  [] Ultrasound (06276) x  [] TA (22589) x     [] Mech Traction (97395)  [] ES(attended) (58879)     [] ES (un) (93405):   [] Vasopump (73556) [] Ionto (65179)   [] Other:    GOALS:  Patient stated goal: \" I want to walk and function better \"  []? Progressing: []? Met: []? Not Met: []? Adjusted     Therapist goals for Patient:   Short Term Goals: To be achieved in: 2 weeks  1. Independent in HEP and progression per patient tolerance, in order to prevent re-injury. []? Progressing: []? Met: []? Not Met: []? Adjusted  2. Patient will have a decrease in pain to facilitate improvement in movement, function, and ADLs as indicated by Functional Deficits. []? Progressing: []? Met: []? Not Met: []? Adjusted     Long Term Goals: To be achieved in:8 weeks  1. Disability index score of25% or less for the LEFS to assist with reaching prior level of function. []? Progressing: []? Met: []? Not Met: []? Adjusted  2. Patient will demonstrate increased AROM to-5-120 to allow for proper joint functioning as indicated by patients Functional Deficits. []? Progressing: []? Met: []? Not Met: []? Adjusted  3. Patient will demonstrate an increase in Strength to good proximal hip strength and control5-/5[]? Progressing: []? Met: []? Not Met: []? Adjusted  4. Patient will return to 75%  functional activities without increased symptoms or restriction. []?  Progressing: []? Met: []? Not Met: []? Adjusted  5. (patient specific functional goal)    []? Progressing: []? Met: []? Not Met: []? Adjusted            ASSESSMENT:  See eval    Treatment/Activity Tolerance:  [x] Patient tolerated treatment well [] Patient limited by fatique  [] Patient limited by pain  [] Patient limited by other medical complications  [] Other:     Overall Progression Towards Functional goals/ Treatment Progress Update:  [] Patient is progressing as expected towards functional goals listed. [] Progression is slowed due to complexities/Impairments listed. [] Progression has been slowed due to co-morbidities. [x] Plan just implemented, too soon to assess goals progression <30days   [] Goals require adjustment due to lack of progress  [] Patient is not progressing as expected and requires additional follow up with physician  [] Other    Prognosis for POC: [x] Good [] Fair  [] Poor    Patient requires continued skilled intervention: [x] Yes  [] No        PLAN: LE arom, prom  strength, proprioception, gait, balance, functional activities. Mfr, joint mobs, mods as needed, hep. Progress as tolerated, very tight in hams and gastroc, ;lacking extension    [] Continue per plan of care [] Alter current plan (see comments)  [x] Plan of care initiated [] Hold pending MD visit [] Discharge    Electronically signed by: Fara Plummer PTA    Note: If patient does not return for scheduled/recommended follow up visits, this note will serve as a discharge from care along with the most recent update on progress.

## 2021-03-10 RX ORDER — OXYCODONE HYDROCHLORIDE 5 MG/1
5 TABLET ORAL EVERY 8 HOURS PRN
Qty: 21 TABLET | Refills: 0 | Status: SHIPPED | OUTPATIENT
Start: 2021-03-10 | End: 2021-03-17

## 2021-03-11 ENCOUNTER — HOSPITAL ENCOUNTER (OUTPATIENT)
Dept: PHYSICAL THERAPY | Age: 71
Setting detail: THERAPIES SERIES
Discharge: HOME | End: 2021-03-11
Payer: MEDICARE

## 2021-03-11 PROCEDURE — 97112 NEUROMUSCULAR REEDUCATION: CPT

## 2021-03-11 PROCEDURE — 97140 MANUAL THERAPY 1/> REGIONS: CPT

## 2021-03-11 PROCEDURE — 97110 THERAPEUTIC EXERCISES: CPT

## 2021-03-11 NOTE — FLOWSHEET NOTE
[facility] - Outpatient Rehabilitation & Therapy  [address] [facility] [address] [place] [address]  Phone: [phone]   Fax:     [phone]      Physical Therapy Treatment Note/ Progress Report:     Date:  3/11/2021    Patient Name:  Hoa Mitchell YOB: 1950  MRN: [id number]    Pertinent Medical History:     Patient Name:  Hoa Mitchell YOB: 1950                     MRN: [id number]     Pertinent Medical History:Additional Pertinent Hx: Meniere's Disease,htn, hyperlipedemia, Depression, oa     Medical/Treatment Diagnosis Information:  · Diagnosis: S/P total knee arthroplasty, left  - Primary  · Treatment Diagnosis: decreased abilty to ambulate and function     Insurance/Certification information:  PT Insurance Information: [facility] Medicare Advantage  Physician Information:  Referring Practitioner: Dr. Neal Butler of care signed (Y/N):         Progress Report: []  Yes  [x]  No     Date Range for reporting period:  Beginning:  3/11/2021  Ending:      Progress report due (10 Rx/or 30 days whichever is less): 00    Recertification due (POC duration/ or 90 days whichever is less):      Visit # POC/Insurance Allowable Auth Needed   4 20 []Yes   []No     Latex Allergy:  [x]NO      []YES  Preferred Language for Healthcare:   [x]English       []Other:    Functional Scale:      Date assessed: at eval  Test: LEFS-26  Score:    Pain level:  4/10     History of Injury: Patient is a 64 y/o male with a hx of left knee pain with a resultant left tka on 21. He was seen for a few weeks at home. He is ambulating with a cane at this time. He c/o constant aching pain in his left knee which is worse with steps, prolonged walking, and attempted squatting. He is retired.   He hopes to return to walking and stretching.         SUBJECTIVE:  Pt states, \" It seems to be doing fairly well \"  3/4/21  Pt states, \" doing pretty good today \"  03/09/21 Patient reports knee is improving,still some stiffness and swelling is about the same. 3/11/21  Pt states, \" doing well, just stiff \"    OBJECTIVE:   Initial- flex-90,  Ext--15, strength-3+/5   03/09/21 flex 108  Ext -12    RESTRICTIONS/PRECAUTIONS:    Exercises/Interventions:     Therapeutic Ex (14827)   Min: Reps/Resistance Notes/CUES   Nu step L3 x 6 min    Leg press 100# x 30, left 45# x 30    Heel slides X 2 min , 30 x 3 stretch    clams     Prone flex X 30,  30 x 3     Ext stretch     Qs with towel roll under heel X 2 min, assist to end range    saq/laq 2# X 2 min ea    Standing hams stretch 60 x 3    incline 60 x 3    Standing qs X 2 min                        Manual Intervention (18729)  Min:     Knee mobs/PROM Mfr to quads, hams gastroc, itb, post tib,  Very tight in hams and gastroc as well as itb, iastm and medi cups to hams x 6 min and proximal gastroc x 30 min    Tib/Fem Mobs     Patella Mobs     Ankle mobs               NMR re-education (39472)  Min:  CUES NEEDED   wobble 2 min    Step ups 4\" x 30    Step taps 4\" x 30 Told him to do this at home to help with ext 3/11/21   bosu/ski     Semi squat     sls     Standing qs X 20         Therapeutic Activity (28982)  Min:      Went over gait with cane trying to reach full ext         Modalities  Min:     IFC with      CP after exercises     MH after exercises            Other Therapeutic Activities: Pt was educated on PT POC, Diagnosis, Prognosis, pathomechanics as well as frequency and duration of scheduling future physical therapy appointments. Time was also taken on this day to answer all patient questions and participation in PT. Reviewed appointment policy in detail with patient and patient verbalized understanding. Home Exercise Program: Patient was instructed in the following for HEP:     . Patient verbalized/demonstrated understanding and was issued written handout.   Seated hamstring Stretch 30 sec x 5  Seated Calf Stretch 30 sec x 5  Seated Flexion Stretch      30 sec x 5  Seated Heel Slide  x30  Long Arc Quad      x 30  Quad Set              x 30  Prone Flexion       x 20  Straight Leg Raise   x 20  Glut Set                  x 20  Ankle Pump          x 20  Semi Squat         x 20        Therapeutic Exercise and NMR EXR  [] (92954) Provided verbal/tactile cueing for activities related to strengthening, flexibility, endurance, ROM for improvements in LE, proximal hip, and core control with self care, mobility, lifting, ambulation.  [] (39921) Provided verbal/tactile cueing for activities related to improving balance, coordination, kinesthetic sense, posture, motor skill, proprioception  to assist with LE, proximal hip, and core control in self care, mobility, lifting, ambulation and eccentric single leg control.      NMR and Therapeutic Activities:    [] (68230 or 39394) Provided verbal/tactile cueing for activities related to improving balance, coordination, kinesthetic sense, posture, motor skill, proprioception and motor activation to allow for proper function of core, proximal hip and LE with self care and ADLs and functional mobility.   [] (97545) Gait Re-education- Provided training and instruction to the patient for proper LE, core and proximal hip recruitment and positioning and eccentric body weight control with ambulation re-education including up and down stairs     Home Exercise Program:    [x] (57384) Reviewed/Progressed HEP activities related to strengthening, flexibility, endurance, ROM of core, proximal hip and LE for functional self-care, mobility, lifting and ambulation/stair navigation   [] (06374)Reviewed/Progressed HEP activities related to improving balance, coordination, kinesthetic sense, posture, motor skill, proprioception of core, proximal hip and LE for self care, mobility, lifting, and ambulation/stair navigation      Manual Treatments:  PROM / STM / Oscillations-Mobs:  G-I, II, III, IV (PA's, Inf., Strength to good proximal hip strength and control5-/5[]? Progressing: []? Met: []? Not Met: []? Adjusted  4. Patient will return to 75%  functional activities without increased symptoms or restriction. []? Progressing: []? Met: []? Not Met: []? Adjusted  5. (patient specific functional goal)    []? Progressing: []? Met: []? Not Met: []? Adjusted            ASSESSMENT:  See eval    Treatment/Activity Tolerance:  [x] Patient tolerated treatment well [] Patient limited by fatique  [] Patient limited by pain  [] Patient limited by other medical complications  [] Other:     Overall Progression Towards Functional goals/ Treatment Progress Update:  [] Patient is progressing as expected towards functional goals listed. [] Progression is slowed due to complexities/Impairments listed. [] Progression has been slowed due to co-morbidities. [x] Plan just implemented, too soon to assess goals progression <30days   [] Goals require adjustment due to lack of progress  [] Patient is not progressing as expected and requires additional follow up with physician  [] Other    Prognosis for POC: [x] Good [] Fair  [] Poor    Patient requires continued skilled intervention: [x] Yes  [] No        PLAN: LE arom, prom  strength, proprioception, gait, balance, functional activities. Mfr, joint mobs, mods as needed, hep. Progress as tolerated, very tight in hams and gastroc, ;lacking extension,  Work on extension and quad control thru the range    [] Continue per plan of care [] Alter current plan (see comments)  [x] Plan of care initiated [] Hold pending MD visit [] Discharge    Electronically signed by: Talib Pinon PT    Note: If patient does not return for scheduled/recommended follow up visits, this note will serve as a discharge from care along with the most recent update on progress.

## 2021-03-16 ENCOUNTER — HOSPITAL ENCOUNTER (OUTPATIENT)
Dept: PHYSICAL THERAPY | Age: 71
Setting detail: THERAPIES SERIES
Discharge: HOME | End: 2021-03-16
Payer: MEDICARE

## 2021-03-16 PROCEDURE — 97110 THERAPEUTIC EXERCISES: CPT

## 2021-03-16 PROCEDURE — 97140 MANUAL THERAPY 1/> REGIONS: CPT

## 2021-03-16 PROCEDURE — 97112 NEUROMUSCULAR REEDUCATION: CPT

## 2021-03-16 NOTE — FLOWSHEET NOTE
\"  03/09/21 Patient reports knee is improving,still some stiffness and swelling is about the same. 3/11/21  Pt states, \" doing well, just stiff \"  03/16/21 Patient reports knee still feels stiff. OBJECTIVE:   Initial- flex-90,  Ext--15, strength-3+/5   03/09/21 flex 108  Ext -12   03/16/21 flex 108 ext -8    RESTRICTIONS/PRECAUTIONS:    Exercises/Interventions:     Therapeutic Ex (89102)   Min: Reps/Resistance Notes/CUES   Nu step L3 x 6 min    Leg press 100# x 30, left 45# x 30    Heel slides  , 30 x 3 stretch    clams     Prone flex X 30,  30 x 3     Ext stretch     Qs with towel roll under heel X 2 min, assist to end range    saq/laq 2# X 2 min ea    Standing hams stretch 60 x 3    incline 60 x 3    Standing qs X 2 min                        Manual Intervention (66526)  Min:     Knee mobs/PROM Mfr to quads, hams gastroc, itb, post tib,  Very tight in hams and gastroc as well as itb, iastm and medi cups to hams x 6 min and proximal gastroc x 30 min    Tib/Fem Mobs     Patella Mobs     Ankle mobs               NMR re-education (32380)  Min:  CUES NEEDED   wobble 2 min    Step ups 4\" x 30    Step taps 4\" x 30 Told him to do this at home to help with ext 3/11/21   bosu/ski     Semi squat     sls     Standing qs X 20         Therapeutic Activity (47197)  Min:      Went over gait with cane trying to reach full ext         Modalities  Min:     IFC with      CP after exercises     MH after exercises            Other Therapeutic Activities: Pt was educated on PT POC, Diagnosis, Prognosis, pathomechanics as well as frequency and duration of scheduling future physical therapy appointments. Time was also taken on this day to answer all patient questions and participation in PT. Reviewed appointment policy in detail with patient and patient verbalized understanding.      Home Exercise Program: Patient was instructed in the following for HEP:     . Patient verbalized/demonstrated understanding and was issued written handout. Seated hamstring Stretch 30 sec x 5  Seated Calf Stretch           30 sec x 5  Seated Flexion Stretch      30 sec x 5  Seated Heel Slide  x30  Long Arc Quad      x 30  Quad Set              x 30  Prone Flexion       x 20  Straight Leg Raise   x 20  Glut Set                  x 20  Ankle Pump          x 20  Semi Squat         x 20        Therapeutic Exercise and NMR EXR  [] (45671) Provided verbal/tactile cueing for activities related to strengthening, flexibility, endurance, ROM for improvements in LE, proximal hip, and core control with self care, mobility, lifting, ambulation.  [] (85041) Provided verbal/tactile cueing for activities related to improving balance, coordination, kinesthetic sense, posture, motor skill, proprioception  to assist with LE, proximal hip, and core control in self care, mobility, lifting, ambulation and eccentric single leg control.      NMR and Therapeutic Activities:    [] (91608 or 21429) Provided verbal/tactile cueing for activities related to improving balance, coordination, kinesthetic sense, posture, motor skill, proprioception and motor activation to allow for proper function of core, proximal hip and LE with self care and ADLs and functional mobility.   [] (94110) Gait Re-education- Provided training and instruction to the patient for proper LE, core and proximal hip recruitment and positioning and eccentric body weight control with ambulation re-education including up and down stairs     Home Exercise Program:    [x] (83100) Reviewed/Progressed HEP activities related to strengthening, flexibility, endurance, ROM of core, proximal hip and LE for functional self-care, mobility, lifting and ambulation/stair navigation   [] (04435)Reviewed/Progressed HEP activities related to improving balance, coordination, kinesthetic sense, posture, motor skill, proprioception of core, proximal hip and LE for self care, mobility, lifting, and ambulation/stair navigation      Manual Treatments:  PROM / STM / Oscillations-Mobs:  G-I, II, III, IV (PA's, Inf., Post.)  [] (46701) Provided manual therapy to mobilize LE, proximal hip and/or LS spine soft tissue/joints for the purpose of modulating pain, promoting relaxation,  increasing ROM, reducing/eliminating soft tissue swelling/inflammation/restriction, improving soft tissue extensibility and allowing for proper ROM for normal function with self care, mobility, lifting and ambulation. Charges:  Timed Code Treatment Minutes: 60   Total Treatment Minutes: 65      [] EVAL (LOW) 19342 (typically 20 minutes face-to-face)  [] EVAL (MOD) 08972 (typically 30 minutes face-to-face)  [] EVAL (HIGH) 88585 (typically 45 minutes face-to-face)  [] RE-EVAL     [x] AR(71615) x1  [] Dry needle 1 or 2 Muscles (92969)  [x] NMR (49154) x 1    [] Dry needle 3+ Muscles (05245)  [x] Manual (22533) x  2  [] Ultrasound (85844) x  [] TA (48616) x     [] Mech Traction (28521)  [] ES(attended) (94482)     [] ES (un) (11767):   [] Vasopump (19257) [] Ionto (10630)   [] Other:    GOALS:  Patient stated goal: \" I want to walk and function better \"  []? Progressing: []? Met: []? Not Met: []? Adjusted     Therapist goals for Patient:   Short Term Goals: To be achieved in: 2 weeks  1. Independent in HEP and progression per patient tolerance, in order to prevent re-injury. []? Progressing: []? Met: []? Not Met: []? Adjusted  2. Patient will have a decrease in pain to facilitate improvement in movement, function, and ADLs as indicated by Functional Deficits. []? Progressing: []? Met: []? Not Met: []? Adjusted     Long Term Goals: To be achieved in:8 weeks  1. Disability index score of25% or less for the LEFS to assist with reaching prior level of function. []? Progressing: []? Met: []? Not Met: []? Adjusted  2. Patient will demonstrate increased AROM to-5-120 to allow for proper joint functioning as indicated by patients Functional Deficits. []? Progressing: []?  Met: []? Not Met: []? Adjusted  3. Patient will demonstrate an increase in Strength to good proximal hip strength and control5-/5[]? Progressing: []? Met: []? Not Met: []? Adjusted  4. Patient will return to 75%  functional activities without increased symptoms or restriction. []? Progressing: []? Met: []? Not Met: []? Adjusted  5. (patient specific functional goal)    []? Progressing: []? Met: []? Not Met: []? Adjusted            ASSESSMENT:  See eval    Treatment/Activity Tolerance:  [x] Patient tolerated treatment well [] Patient limited by fatique  [] Patient limited by pain  [] Patient limited by other medical complications  [] Other:     Overall Progression Towards Functional goals/ Treatment Progress Update:  [] Patient is progressing as expected towards functional goals listed. [] Progression is slowed due to complexities/Impairments listed. [] Progression has been slowed due to co-morbidities. [x] Plan just implemented, too soon to assess goals progression <30days   [] Goals require adjustment due to lack of progress  [] Patient is not progressing as expected and requires additional follow up with physician  [] Other    Prognosis for POC: [x] Good [] Fair  [] Poor    Patient requires continued skilled intervention: [x] Yes  [] No        PLAN: LE arom, prom  strength, proprioception, gait, balance, functional activities. Mfr, joint mobs, mods as needed, hep. Progress as tolerated, very tight in hams and gastroc, ;lacking extension,  Work on extension and quad control thru the range    [] Continue per plan of care [] Alter current plan (see comments)  [x] Plan of care initiated [] Hold pending MD visit [] Discharge    Electronically signed by: Lamine Emanuel PTA    Note: If patient does not return for scheduled/recommended follow up visits, this note will serve as a discharge from care along with the most recent update on progress.

## 2021-03-18 ENCOUNTER — APPOINTMENT (OUTPATIENT)
Dept: PHYSICAL THERAPY | Age: 71
End: 2021-03-18
Payer: MEDICARE

## 2021-03-23 ENCOUNTER — HOSPITAL ENCOUNTER (OUTPATIENT)
Dept: PHYSICAL THERAPY | Age: 71
Setting detail: THERAPIES SERIES
Discharge: HOME | End: 2021-03-23
Payer: MEDICARE

## 2021-03-23 ENCOUNTER — OFFICE VISIT (OUTPATIENT)
Dept: ORTHOPEDIC SURGERY | Age: 71
End: 2021-03-23

## 2021-03-23 VITALS — HEIGHT: 69 IN | TEMPERATURE: 97.9 F | WEIGHT: 231 LBS | BODY MASS INDEX: 34.21 KG/M2

## 2021-03-23 DIAGNOSIS — T84.82XA ARTHROFIBROSIS OF TOTAL KNEE REPLACEMENT, INITIAL ENCOUNTER (HCC): ICD-10-CM

## 2021-03-23 DIAGNOSIS — Z96.652 S/P TOTAL KNEE ARTHROPLASTY, LEFT: Primary | ICD-10-CM

## 2021-03-23 PROCEDURE — 97110 THERAPEUTIC EXERCISES: CPT

## 2021-03-23 PROCEDURE — 99024 POSTOP FOLLOW-UP VISIT: CPT | Performed by: ORTHOPAEDIC SURGERY

## 2021-03-23 PROCEDURE — 97140 MANUAL THERAPY 1/> REGIONS: CPT

## 2021-03-23 PROCEDURE — 97112 NEUROMUSCULAR REEDUCATION: CPT

## 2021-03-23 RX ORDER — METHYLPREDNISOLONE 4 MG/1
TABLET ORAL
Qty: 1 KIT | Refills: 0 | Status: SHIPPED | OUTPATIENT
Start: 2021-03-23 | End: 2021-03-29

## 2021-03-23 NOTE — PROGRESS NOTES
Aure Beth  <[id number]>  March 23, 2021    Chief Complaint   Patient presents with   Melchor Adkins Post-Op Check     2/8/21 L TKA             History: The patient is here in follow-up regarding his left knee. He is now approximately 6 weeks status post left total knee arthroplasty. He continues to participate in therapy. He has had some issues with stiffness. He denies any numbness or tingling. He reports mild to moderate pain. He is ambulating without assistive devices. The patient's  past medical history, medications, allergies,  family history, social history, and review of systems have been reviewed, and dated and are recorded in the chart. Temp 97.9 °F (36.6 °C)   Ht 5' 9\" (1.753 m)   Wt 231 lb (104.8 kg)   BMI 34.11 kg/m²     Physical: Mr. Aure Beth appears well, he is in no apparent distress, he demonstrates appropriate mood & affect. He is alert and oriented to person, place and time. He has mild swelling. There is No evidence of DVT seen on physical exam.. He is neurovascularly intact distally. Range of motion is from -5 degrees to 115 degrees. The incision is  clean, dry and intact and without erythema. Strength in the knee is 4+/5. There is no instability with varus and valgus stressing of the knee. There is no pain with range of motion of the hips. Impression: Status post left Total Knee Arthroplasty 2. arthrofibrosis left knee      Plan:  He will continue to work aggressively on range of motion and strengthening: Natural history and expected course discussed. Questions answered. Quad strengthening exercises. At this time, we will give the patient a Medrol Dosepak. He will continue to work aggressively on his range of motion. We will get the patient fitted for an extension brace. He will follow-up with me in 1 month and we will reassess him then.

## 2021-03-23 NOTE — FLOWSHEET NOTE
[facility] - Outpatient Rehabilitation & Therapy  [address]. [place]  Phone: [phone]   Fax:     [phone]      Physical Therapy Treatment Note/ Progress Report:     Date:  3/23/2021    Patient Name:  Teto Tineo    :  1950  MRN: [id number]    Pertinent Medical History:     Patient Name:  Teto Tineo                         :  1950                     MRN: [id number]     Pertinent Medical History:Additional Pertinent Hx: Meniere's Disease,htn, hyperlipedemia, Depression, oa     Medical/Treatment Diagnosis Information:  · Diagnosis: S/P total knee arthroplasty, left  - Primary  · Treatment Diagnosis: decreased abilty to ambulate and function     Insurance/Certification information:  PT Insurance Information: [facility] Medicare Advantage  Physician Information:  Referring Practitioner: Dr. Mike Baldwin of care signed (Y/N):         Progress Report: []  Yes  [x]  No     Date Range for reporting period:  Beginning:  3/23/2021  Ending:      Progress report due (10 Rx/or 30 days whichever is less): 90    Recertification due (POC duration/ or 90 days whichever is less):      Visit # POC/Insurance Allowable Auth Needed   6 20 []Yes   []No     Latex Allergy:  [x]NO      []YES  Preferred Language for Healthcare:   [x]English       []Other:    Functional Scale:      Date assessed: at eval  Test: LEFS-26  Score:    Pain level:  4/10     History of Injury: Patient is a 66 y/o male with a hx of left knee pain with a resultant left tka on 21. He was seen for a few weeks at home. He is ambulating with a cane at this time. He c/o constant aching pain in his left knee which is worse with steps, prolonged walking, and attempted squatting. He is retired.   He hopes to return to walking and stretching.         SUBJECTIVE:  Pt states, \" It seems to be doing fairly well \"  3/4/21  Pt states, \" doing pretty good today \"  03/09/21 Patient reports knee is improving,still some stiffness and swelling is about the same. 3/11/21  Pt states, \" doing well, just stiff \"  03/16/21 Patient reports knee still feels stiff.  3/23/21  Pt states, \" MD said the swelling may be part of the problem, he gave me steroids \"    OBJECTIVE:   Initial- flex-90,  Ext--15, strength-3+/5   03/09/21 flex 108  Ext -12   03/16/21 flex 108 ext -8    RESTRICTIONS/PRECAUTIONS:    Exercises/Interventions:     Therapeutic Ex (91216)   Min: Reps/Resistance Notes/CUES   Nu step L3 x 6 min    Leg press 100# x 30, left 45# x 30    Heel slides  , 30 x 3 stretch    clams     Prone flex X 30,  30 x 3     Ext stretch     Qs with towel roll under heel X 3min, assist to end range    saq/laq 2# X 2 min ea    Standing hams stretch 60 x 3    incline 60 x 3    Standing qs X 2 min    pilates press 3sp x 40, left 2 sp x 30                   Manual Intervention (01421)  Min:     Knee mobs/PROM Mfr to quads, hams gastroc, itb, post tib,  Very tight in hams and gastroc as well as itb, iastm and medi cups to hams x 6 min and proximal gastroc x 30 min, Semimembranosus and Tendinosis very tight. Tib/Fem Mobs     Patella Mobs     Ankle mobs               NMR re-education (08790)  Min:  CUES NEEDED   wobble 2 min    Step ups 4\" x 30    Step taps 4\" x 30 Told him to do this at home to help with ext 3/11/21   bosu/ski     Semi squat     sls     Standing qs X 20         Therapeutic Activity (18384)  Min:      Went over gait with cane trying to reach full ext         Modalities  Min:     IFC with      CP after exercises     MH after exercises            Other Therapeutic Activities: Pt was educated on PT POC, Diagnosis, Prognosis, pathomechanics as well as frequency and duration of scheduling future physical therapy appointments. Time was also taken on this day to answer all patient questions and participation in PT.  Reviewed appointment policy in detail with patient and patient coordination, kinesthetic sense, posture, motor skill, proprioception of core, proximal hip and LE for self care, mobility, lifting, and ambulation/stair navigation      Manual Treatments:  PROM / STM / Oscillations-Mobs:  G-I, II, III, IV (PA's, Inf., Post.)  [] (96601) Provided manual therapy to mobilize LE, proximal hip and/or LS spine soft tissue/joints for the purpose of modulating pain, promoting relaxation,  increasing ROM, reducing/eliminating soft tissue swelling/inflammation/restriction, improving soft tissue extensibility and allowing for proper ROM for normal function with self care, mobility, lifting and ambulation. Charges:  Timed Code Treatment Minutes: 60   Total Treatment Minutes: 65      [] EVAL (LOW) 46014 (typically 20 minutes face-to-face)  [] EVAL (MOD) 53118 (typically 30 minutes face-to-face)  [] EVAL (HIGH) 68259 (typically 45 minutes face-to-face)  [] RE-EVAL     [x] MY(28101) x1  [] Dry needle 1 or 2 Muscles (29548)  [x] NMR (09305) x 1    [] Dry needle 3+ Muscles (69685)  [x] Manual (00782) x  2  [] Ultrasound (31809) x  [] TA (33925) x     [] Mech Traction (27697)  [] ES(attended) (66683)     [] ES (un) (28618):   [] Vasopump (36500) [] Ionto (53458)   [] Other:    GOALS:  Patient stated goal: \" I want to walk and function better \"  []? Progressing: []? Met: []? Not Met: []? Adjusted     Therapist goals for Patient:   Short Term Goals: To be achieved in: 2 weeks  1. Independent in HEP and progression per patient tolerance, in order to prevent re-injury. []? Progressing: []? Met: []? Not Met: []? Adjusted  2. Patient will have a decrease in pain to facilitate improvement in movement, function, and ADLs as indicated by Functional Deficits. []? Progressing: []? Met: []? Not Met: []? Adjusted     Long Term Goals: To be achieved in:8 weeks  1. Disability index score of25% or less for the LEFS to assist with reaching prior level of function. []? Progressing: []? Met: []?  Not Met: []? Adjusted  2. Patient will demonstrate increased AROM to-5-120 to allow for proper joint functioning as indicated by patients Functional Deficits. []? Progressing: []? Met: []? Not Met: []? Adjusted  3. Patient will demonstrate an increase in Strength to good proximal hip strength and control5-/5[]? Progressing: []? Met: []? Not Met: []? Adjusted  4. Patient will return to 75%  functional activities without increased symptoms or restriction. []? Progressing: []? Met: []? Not Met: []? Adjusted  5. (patient specific functional goal)    []? Progressing: []? Met: []? Not Met: []? Adjusted            ASSESSMENT:  See eval    Treatment/Activity Tolerance:  [x] Patient tolerated treatment well [] Patient limited by fatique  [] Patient limited by pain  [] Patient limited by other medical complications  [] Other:     Overall Progression Towards Functional goals/ Treatment Progress Update:  [] Patient is progressing as expected towards functional goals listed. [] Progression is slowed due to complexities/Impairments listed. [] Progression has been slowed due to co-morbidities. [x] Plan just implemented, too soon to assess goals progression <30days   [] Goals require adjustment due to lack of progress  [] Patient is not progressing as expected and requires additional follow up with physician  [] Other    Prognosis for POC: [x] Good [] Fair  [] Poor    Patient requires continued skilled intervention: [x] Yes  [] No        PLAN: LE arom, prom  strength, proprioception, gait, balance, functional activities. Mfr, joint mobs, mods as needed, hep.  Progress as tolerated, very tight in hams and gastroc, ;lacking extension,  Work on extension and quad control thru the range    [] Continue per plan of care [] Alter current plan (see comments)  [x] Plan of care initiated [] Hold pending MD visit [] Discharge    Electronically signed by: Risa Mendoza PT    Note: If patient does not return for scheduled/recommended follow up visits, this note will serve as a discharge from care along with the most recent update on progress.

## 2021-03-25 ENCOUNTER — HOSPITAL ENCOUNTER (OUTPATIENT)
Dept: PHYSICAL THERAPY | Age: 71
Setting detail: THERAPIES SERIES
Discharge: HOME | End: 2021-03-25
Payer: MEDICARE

## 2021-03-25 PROCEDURE — 97110 THERAPEUTIC EXERCISES: CPT

## 2021-03-25 PROCEDURE — 97112 NEUROMUSCULAR REEDUCATION: CPT

## 2021-03-25 PROCEDURE — 97140 MANUAL THERAPY 1/> REGIONS: CPT

## 2021-03-25 NOTE — FLOWSHEET NOTE
[facility] - Outpatient Rehabilitation & Therapy  [address]. [place] Guillen  Phone: [phone]   Fax:     [phone]      Physical Therapy Treatment Note/ Progress Report:     Date:  3/25/2021    Patient Name:  Won Mcghee    :  1950  MRN: [id number]    Pertinent Medical History:     Patient Name:  Won Mcghee                         :  1950                     MRN: [id number]     Pertinent Medical History:Additional Pertinent Hx: Meniere's Disease,htn, hyperlipedemia, Depression, oa     Medical/Treatment Diagnosis Information:  · Diagnosis: S/P total knee arthroplasty, left  - Primary  · Treatment Diagnosis: decreased abilty to ambulate and function     Insurance/Certification information:  PT Insurance Information: [facility] Medicare Advantage  Physician Information:  Referring Practitioner: Dr. Susy Caceres of care signed (Y/N):         Progress Report: []  Yes  [x]  No     Date Range for reporting period:  Beginning:  3/25/2021  Ending:      Progress report due (10 Rx/or 30 days whichever is less): 56    Recertification due (POC duration/ or 90 days whichever is less):      Visit # POC/Insurance Allowable Auth Needed   7 20 []Yes   []No     Latex Allergy:  [x]NO      []YES  Preferred Language for Healthcare:   [x]English       []Other:    Functional Scale:      Date assessed: at eval  Test: LEFS-26  Score:    Pain level:  310     History of Injury: Patient is a 64 y/o male with a hx of left knee pain with a resultant left tka on 21. He was seen for a few weeks at home. He is ambulating with a cane at this time. He c/o constant aching pain in his left knee which is worse with steps, prolonged walking, and attempted squatting. He is retired.   He hopes to return to walking and stretching.         SUBJECTIVE:  Pt states, \" It seems to be doing fairly well \"  3/4/21  Pt states, \" doing pretty good today \"  03/09/21 Patient reports knee is improving,still some stiffness and swelling is about the same. 3/11/21  Pt states, \" doing well, just stiff \"  03/16/21 Patient reports knee still feels stiff.  3/23/21  Pt states, \" MD said the swelling may be part of the problem, he gave me steroids \"  03/25/21 Patient reports knee is feeling better since he started taking steroids. OBJECTIVE:   Initial- flex-90,  Ext--15, strength-3+/5   03/09/21 flex 108  Ext -12   03/16/21 flex 108 ext -8    RESTRICTIONS/PRECAUTIONS:    Exercises/Interventions:     Therapeutic Ex (68300)   Min: Reps/Resistance Notes/CUES   Nu step L3 x 6 min    Leg press 100# x 30, left 45# x 30    Heel slides  , 30 x 3 stretch    clams     Prone flex X 30,  30 x 3     Ext stretch     Qs with towel roll under heel X 3min, assist to end range    saq/laq 2# X 2 min ea    Standing hams stretch 60 x 3    incline 60 x 3    Standing qs X 2 min    pilates press 3sp x 40, left 2 sp x 30                   Manual Intervention (39925)  Min:     Knee mobs/PROM Mfr to quads, hams gastroc, itb, post tib,  Very tight in hams and gastroc as well as itb, iastm and medi cups to hams x 6 min and proximal gastroc x 30 min, Semimembranosus and Tendinosis very tight. Tib/Fem Mobs     Patella Mobs     Ankle mobs               NMR re-education (58901)  Min:  CUES NEEDED   wobble 2 min    Step ups 4\" x 30    Step taps 4\" x 30 Told him to do this at home to help with ext 3/11/21   bosu/ski     Semi squat     sls     Standing qs X 20         Therapeutic Activity (85516)  Min:      Went over gait with cane trying to reach full ext         Modalities  Min:     IFC with      CP after exercises     MH after exercises            Other Therapeutic Activities: Pt was educated on PT POC, Diagnosis, Prognosis, pathomechanics as well as frequency and duration of scheduling future physical therapy appointments.  Time was also taken on this day to answer all patient questions and participation in PT. Reviewed appointment policy in detail with patient and patient verbalized understanding. Home Exercise Program: Patient was instructed in the following for HEP:     . Patient verbalized/demonstrated understanding and was issued written handout. Seated hamstring Stretch 30 sec x 5  Seated Calf Stretch           30 sec x 5  Seated Flexion Stretch      30 sec x 5  Seated Heel Slide  x30  Long Arc Quad      x 30  Quad Set              x 30  Prone Flexion       x 20  Straight Leg Raise   x 20  Glut Set                  x 20  Ankle Pump          x 20  Semi Squat         x 20        Therapeutic Exercise and NMR EXR  [] (67470) Provided verbal/tactile cueing for activities related to strengthening, flexibility, endurance, ROM for improvements in LE, proximal hip, and core control with self care, mobility, lifting, ambulation.  [] (56726) Provided verbal/tactile cueing for activities related to improving balance, coordination, kinesthetic sense, posture, motor skill, proprioception  to assist with LE, proximal hip, and core control in self care, mobility, lifting, ambulation and eccentric single leg control.      NMR and Therapeutic Activities:    [] (01083 or 98843) Provided verbal/tactile cueing for activities related to improving balance, coordination, kinesthetic sense, posture, motor skill, proprioception and motor activation to allow for proper function of core, proximal hip and LE with self care and ADLs and functional mobility.   [] (53486) Gait Re-education- Provided training and instruction to the patient for proper LE, core and proximal hip recruitment and positioning and eccentric body weight control with ambulation re-education including up and down stairs     Home Exercise Program:    [x] (82509) Reviewed/Progressed HEP activities related to strengthening, flexibility, endurance, ROM of core, proximal hip and LE for functional self-care, mobility, lifting and ambulation/stair navigation [] (37639)Reviewed/Progressed HEP activities related to improving balance, coordination, kinesthetic sense, posture, motor skill, proprioception of core, proximal hip and LE for self care, mobility, lifting, and ambulation/stair navigation      Manual Treatments:  PROM / STM / Oscillations-Mobs:  G-I, II, III, IV (PA's, Inf., Post.)  [] (52177) Provided manual therapy to mobilize LE, proximal hip and/or LS spine soft tissue/joints for the purpose of modulating pain, promoting relaxation,  increasing ROM, reducing/eliminating soft tissue swelling/inflammation/restriction, improving soft tissue extensibility and allowing for proper ROM for normal function with self care, mobility, lifting and ambulation. Charges:  Timed Code Treatment Minutes: 60   Total Treatment Minutes: 65      [] EVAL (LOW) 46116 (typically 20 minutes face-to-face)  [] EVAL (MOD) 51004 (typically 30 minutes face-to-face)  [] EVAL (HIGH) 57655 (typically 45 minutes face-to-face)  [] RE-EVAL     [x] MB(22076) x1  [] Dry needle 1 or 2 Muscles (44465)  [x] NMR (92177) x 1    [] Dry needle 3+ Muscles (34165)  [x] Manual (37989) x  2  [] Ultrasound (12213) x  [] TA (63728) x     [] Mech Traction (18293)  [] ES(attended) (78578)     [] ES (un) (75783):   [] Vasopump (47088) [] Ionto (50557)   [] Other:    GOALS:  Patient stated goal: \" I want to walk and function better \"  []? Progressing: []? Met: []? Not Met: []? Adjusted     Therapist goals for Patient:   Short Term Goals: To be achieved in: 2 weeks  1. Independent in HEP and progression per patient tolerance, in order to prevent re-injury. []? Progressing: []? Met: []? Not Met: []? Adjusted  2. Patient will have a decrease in pain to facilitate improvement in movement, function, and ADLs as indicated by Functional Deficits. []? Progressing: []? Met: []? Not Met: []? Adjusted     Long Term Goals: To be achieved in:8 weeks  1.  Disability index score of25% or less for the LEFS to assist with reaching prior level of function. []? Progressing: []? Met: []? Not Met: []? Adjusted  2. Patient will demonstrate increased AROM to-5-120 to allow for proper joint functioning as indicated by patients Functional Deficits. []? Progressing: []? Met: []? Not Met: []? Adjusted  3. Patient will demonstrate an increase in Strength to good proximal hip strength and control5-/5[]? Progressing: []? Met: []? Not Met: []? Adjusted  4. Patient will return to 75%  functional activities without increased symptoms or restriction. []? Progressing: []? Met: []? Not Met: []? Adjusted  5. (patient specific functional goal)    []? Progressing: []? Met: []? Not Met: []? Adjusted            ASSESSMENT:  See eval    Treatment/Activity Tolerance:  [x] Patient tolerated treatment well [] Patient limited by fatique  [] Patient limited by pain  [] Patient limited by other medical complications  [] Other:     Overall Progression Towards Functional goals/ Treatment Progress Update:  [] Patient is progressing as expected towards functional goals listed. [] Progression is slowed due to complexities/Impairments listed. [] Progression has been slowed due to co-morbidities. [x] Plan just implemented, too soon to assess goals progression <30days   [] Goals require adjustment due to lack of progress  [] Patient is not progressing as expected and requires additional follow up with physician  [] Other    Prognosis for POC: [x] Good [] Fair  [] Poor    Patient requires continued skilled intervention: [x] Yes  [] No        PLAN: LE arom, prom  strength, proprioception, gait, balance, functional activities. Mfr, joint mobs, mods as needed, hep.  Progress as tolerated, very tight in hams and gastroc, ;lacking extension,  Work on extension and quad control thru the range    [] Continue per plan of care [] Alter current plan (see comments)  [x] Plan of care initiated [] Hold pending MD visit [] Discharge    Electronically signed by: De Page PATIENCE Nguyen    Note: If patient does not return for scheduled/recommended follow up visits, this note will serve as a discharge from care along with the most recent update on progress.

## 2021-03-30 ENCOUNTER — HOSPITAL ENCOUNTER (OUTPATIENT)
Dept: PHYSICAL THERAPY | Age: 71
Setting detail: THERAPIES SERIES
Discharge: HOME | End: 2021-03-30
Payer: MEDICARE

## 2021-03-30 PROCEDURE — 97110 THERAPEUTIC EXERCISES: CPT

## 2021-03-30 PROCEDURE — 97140 MANUAL THERAPY 1/> REGIONS: CPT

## 2021-03-30 PROCEDURE — 97112 NEUROMUSCULAR REEDUCATION: CPT

## 2021-03-30 NOTE — FLOWSHEET NOTE
[facility] - Outpatient Rehabilitation & Therapy  [address]. [place]  Phone: [phone]   Fax:     [phone]      Physical Therapy Treatment Note/ Progress Report:     Date:  3/30/2021    Patient Name:  Claudia Garcia    :  1950  MRN: [id number]    Pertinent Medical History:     Patient Name:  Claudia Garcia                         :  1950                     MRN: [id number]     Pertinent Medical History:Additional Pertinent Hx: Meniere's Disease,htn, hyperlipedemia, Depression, oa     Medical/Treatment Diagnosis Information:  · Diagnosis: S/P total knee arthroplasty, left  - Primary  · Treatment Diagnosis: decreased abilty to ambulate and function     Insurance/Certification information:  PT Insurance Information: [facility] Medicare Advantage  Physician Information:  Referring Practitioner: Dr. Bae Level of care signed (Y/N):         Progress Report: []  Yes  [x]  No     Date Range for reporting period:  Beginning:  3/30/2021  Ending:      Progress report due (10 Rx/or 30 days whichever is less): 9/3/09    Recertification due (POC duration/ or 90 days whichever is less):      Visit # POC/Insurance Allowable Auth Needed   8 20 []Yes   []No     Latex Allergy:  [x]NO      []YES  Preferred Language for Healthcare:   [x]English       []Other:    Functional Scale:      Date assessed: at eval  Test: LEFS-26  Score:    Pain level:  3/10     History of Injury: Patient is a 66 y/o male with a hx of left knee pain with a resultant left tka on 21. He was seen for a few weeks at home. He is ambulating with a cane at this time. He c/o constant aching pain in his left knee which is worse with steps, prolonged walking, and attempted squatting. He is retired.   He hopes to return to walking and stretching.         SUBJECTIVE:  Pt states, \" It seems to be doing fairly well \"  3/4/21  Pt states, \" doing pretty good today \"  03/09/21 Patient reports knee is improving,still some stiffness and swelling is about the same. 3/11/21  Pt states, \" doing well, just stiff \"  03/16/21 Patient reports knee still feels stiff.  3/23/21  Pt states, \" MD said the swelling may be part of the problem, he gave me steroids \"  03/25/21 Patient reports knee is feeling better since he started taking steroids. 0/3/30/21 Patient reports knee is doing ok,just stiffness. Blanca Guess  with dynasplint set up for extension brace. OBJECTIVE:   Initial- flex-90,  Ext--15, strength-3+/5   03/09/21 flex 108  Ext -12   03/16/21 flex 108 ext -8   03/30/21 flex  115 ext -8     RESTRICTIONS/PRECAUTIONS:    Exercises/Interventions:     Therapeutic Ex (29035)   Min: Reps/Resistance Notes/CUES   Nu step L3 x 6 min    Leg press 100# x 30, left 45# x 30    Heel slides  , 30 x 3 stretch    clams     Prone flex X 30,  30 x 3     Ext stretch     Qs with towel roll under heel X 3min, assist to end range    saq/laq     Standing hams stretch 60 x 3    incline 60 x 3    Standing qs X 2 min    pilates press 3sp x 40, left 2 sp x 30                   Manual Intervention (21089)  Min:     Knee mobs/PROM Mfr to quads, hams gastroc, itb, post tib,  Very tight in hams and gastroc as well as itb,  15 min, Semimembranosus and Tendinosis very tight. Tib/Fem Mobs     Patella Mobs     Ankle mobs               NMR re-education (34667)  Min:  CUES NEEDED   wobble 2 min    Step ups 4\" x 30    Step taps 4\" x 30 Told him to do this at home to help with ext 3/11/21   bosu/ski     Semi squat     sls     Standing qs X 20         Therapeutic Activity (06684)  Min:      Went over gait with cane trying to reach full ext         Modalities  Min:     IFC with      CP after exercises     MH after exercises            Other Therapeutic Activities: Pt was educated on PT POC, Diagnosis, Prognosis, pathomechanics as well as frequency and duration of scheduling future physical therapy appointments.  Time weeks  1. Disability index score of25% or less for the LEFS to assist with reaching prior level of function. []? Progressing: []? Met: []? Not Met: []? Adjusted  2. Patient will demonstrate increased AROM to-5-120 to allow for proper joint functioning as indicated by patients Functional Deficits. []? Progressing: []? Met: []? Not Met: []? Adjusted  3. Patient will demonstrate an increase in Strength to good proximal hip strength and control5-/5[]? Progressing: []? Met: []? Not Met: []? Adjusted  4. Patient will return to 75%  functional activities without increased symptoms or restriction. []? Progressing: []? Met: []? Not Met: []? Adjusted  5. (patient specific functional goal)    []? Progressing: []? Met: []? Not Met: []? Adjusted            ASSESSMENT:  See eval    Treatment/Activity Tolerance:  [x] Patient tolerated treatment well [] Patient limited by fatique  [] Patient limited by pain  [] Patient limited by other medical complications  [] Other:     Overall Progression Towards Functional goals/ Treatment Progress Update:  [] Patient is progressing as expected towards functional goals listed. [] Progression is slowed due to complexities/Impairments listed. [] Progression has been slowed due to co-morbidities. [x] Plan just implemented, too soon to assess goals progression <30days   [] Goals require adjustment due to lack of progress  [] Patient is not progressing as expected and requires additional follow up with physician  [] Other    Prognosis for POC: [x] Good [] Fair  [] Poor    Patient requires continued skilled intervention: [x] Yes  [] No        PLAN: LE arom, prom  strength, proprioception, gait, balance, functional activities. Mfr, joint mobs, mods as needed, hep.  Progress as tolerated, very tight in hams and gastroc, ;lacking extension,  Work on extension and quad control thru the range    [] Continue per plan of care [] Alter current plan (see comments)  [x] Plan of care initiated [] Hold pending MD visit [] Discharge    Electronically signed by: Dian Mattson PTA    Note: If patient does not return for scheduled/recommended follow up visits, this note will serve as a discharge from care along with the most recent update on progress.

## 2021-04-01 ENCOUNTER — HOSPITAL ENCOUNTER (OUTPATIENT)
Dept: PHYSICAL THERAPY | Age: 71
Setting detail: THERAPIES SERIES
Discharge: HOME | End: 2021-04-01
Payer: MEDICARE

## 2021-04-01 PROCEDURE — 97110 THERAPEUTIC EXERCISES: CPT

## 2021-04-01 PROCEDURE — 97140 MANUAL THERAPY 1/> REGIONS: CPT

## 2021-04-01 PROCEDURE — 97112 NEUROMUSCULAR REEDUCATION: CPT

## 2021-04-01 NOTE — FLOWSHEET NOTE
[facility] - Outpatient Rehabilitation & Therapy  [address]. [place]  Phone: [phone]   Fax:     [phone]      Physical Therapy Treatment Note/ Progress Report:     Date:  2021    Patient Name:  Taco Gutierrez    :  1950  MRN: [id number]    Pertinent Medical History:     Patient Name:  Taco Gutierrez                         :  1950                     MRN: [id number]     Pertinent Medical History:Additional Pertinent Hx: Meniere's Disease,htn, hyperlipedemia, Depression, oa     Medical/Treatment Diagnosis Information:  · Diagnosis: S/P total knee arthroplasty, left  - Primary  · Treatment Diagnosis: decreased abilty to ambulate and function     Insurance/Certification information:  PT Insurance Information: [facility] Medicare Advantage  Physician Information:  Referring Practitioner: Dr. Andrea Silva of care signed (Y/N):         Progress Report: []  Yes  [x]  No     Date Range for reporting period:  Beginnin2021  Ending:      Progress report due (10 Rx/or 30 days whichever is less): 63    Recertification due (POC duration/ or 90 days whichever is less):      Visit # POC/Insurance Allowable Auth Needed   9 20 []Yes   []No     Latex Allergy:  [x]NO      []YES  Preferred Language for Healthcare:   [x]English       []Other:    Functional Scale:      Date assessed: at eval  Test: LEFS-26  Score:    Pain level:  3/10     History of Injury: Patient is a 64 y/o male with a hx of left knee pain with a resultant left tka on 21. He was seen for a few weeks at home. He is ambulating with a cane at this time. He c/o constant aching pain in his left knee which is worse with steps, prolonged walking, and attempted squatting. He is retired.   He hopes to return to walking and stretching.         SUBJECTIVE:  Pt states, \" It seems to be doing fairly well \"  3/4/21  Pt states, \" doing pretty good today \"  03/09/21 Patient reports knee is improving,still some stiffness and swelling is about the same. 3/11/21  Pt states, \" doing well, just stiff \"  03/16/21 Patient reports knee still feels stiff.  3/23/21  Pt states, \" MD said the swelling may be part of the problem, he gave me steroids \"  03/25/21 Patient reports knee is feeling better since he started taking steroids. 0/3/30/21 Patient reports knee is doing ok,just stiffness. Contreras Cunningham  with dynasplint set up for extension brace. 4/1/21  Pt states, \" wearing the stretcher, not sure if it's doing much yet \" \"      OBJECTIVE:   Initial- flex-90,  Ext--15, strength-3+/5   03/09/21 flex 108  Ext -12   03/16/21 flex 108 ext -8   03/30/21 flex  115 ext -8     RESTRICTIONS/PRECAUTIONS:    Exercises/Interventions:     Therapeutic Ex (33870)   Min: Reps/Resistance Notes/CUES   Nu step L3 x 6 min    Leg press 100# x 40, left 45# x 40    Heel slides X 2 min , 30 x 3 stretch    clams     Prone flex X 30,  30 x 3     Ext stretch     Qs with towel roll under heel X 3min, assist to end range    saq/laq     Standing hams stretch 60 x 3    incline 60 x 3, with qs    Standing qs X 2 min    pilates press 3sp x 40, left 2 sp x 30                   Manual Intervention (83546)  Min:     Knee mobs/PROM Mfr to quads, hams gastroc, itb, post tib,  Very tight in hams and gastroc as well as itb, medi cups to gastroc  x 6 min and 15 min, Semimembranosus and Tendinosis very tight.     Tib/Fem Mobs     Patella Mobs     Ankle mobs               NMR re-education (65996)  Min:  CUES NEEDED   wobble 2 min    Step ups 4\" x 30    Step taps 4\" x 30 Told him to do this at home to help with ext 3/11/21   bosu/ski     Semi squat     sls     Standing qs X 20         Therapeutic Activity (57437)  Min:      Went over gait with cane trying to reach full ext         Modalities  Min:     IFC with      CP after exercises     MH after exercises            Other Therapeutic Activities: Pt was educated on PT POC, Diagnosis, Prognosis, pathomechanics as well as frequency and duration of scheduling future physical therapy appointments. Time was also taken on this day to answer all patient questions and participation in PT. Reviewed appointment policy in detail with patient and patient verbalized understanding. Home Exercise Program: Patient was instructed in the following for HEP:     . Patient verbalized/demonstrated understanding and was issued written handout. Seated hamstring Stretch 30 sec x 5  Seated Calf Stretch           30 sec x 5  Seated Flexion Stretch      30 sec x 5  Seated Heel Slide  x30  Long Arc Quad      x 30  Quad Set              x 30  Prone Flexion       x 20  Straight Leg Raise   x 20  Glut Set                  x 20  Ankle Pump          x 20  Semi Squat         x 20        Therapeutic Exercise and NMR EXR  [] (84136) Provided verbal/tactile cueing for activities related to strengthening, flexibility, endurance, ROM for improvements in LE, proximal hip, and core control with self care, mobility, lifting, ambulation.  [] (33356) Provided verbal/tactile cueing for activities related to improving balance, coordination, kinesthetic sense, posture, motor skill, proprioception  to assist with LE, proximal hip, and core control in self care, mobility, lifting, ambulation and eccentric single leg control.      NMR and Therapeutic Activities:    [] (19806 or 73881) Provided verbal/tactile cueing for activities related to improving balance, coordination, kinesthetic sense, posture, motor skill, proprioception and motor activation to allow for proper function of core, proximal hip and LE with self care and ADLs and functional mobility.   [] (43804) Gait Re-education- Provided training and instruction to the patient for proper LE, core and proximal hip recruitment and positioning and eccentric body weight control with ambulation re-education including up and down stairs     Home Exercise Program:    [x] (49922) Reviewed/Progressed HEP activities related to strengthening, flexibility, endurance, ROM of core, proximal hip and LE for functional self-care, mobility, lifting and ambulation/stair navigation   [] (63159)Reviewed/Progressed HEP activities related to improving balance, coordination, kinesthetic sense, posture, motor skill, proprioception of core, proximal hip and LE for self care, mobility, lifting, and ambulation/stair navigation      Manual Treatments:  PROM / STM / Oscillations-Mobs:  G-I, II, III, IV (PA's, Inf., Post.)  [] (94162) Provided manual therapy to mobilize LE, proximal hip and/or LS spine soft tissue/joints for the purpose of modulating pain, promoting relaxation,  increasing ROM, reducing/eliminating soft tissue swelling/inflammation/restriction, improving soft tissue extensibility and allowing for proper ROM for normal function with self care, mobility, lifting and ambulation. Charges:  Timed Code Treatment Minutes: 50   Total Treatment Minutes: 55      [] EVAL (LOW) 78689 (typically 20 minutes face-to-face)  [] EVAL (MOD) 27569 (typically 30 minutes face-to-face)  [] EVAL (HIGH) 93295 (typically 45 minutes face-to-face)  [] RE-EVAL     [x] VX(63726) x1  [] Dry needle 1 or 2 Muscles (10287)  [x] NMR (94564) x 1    [] Dry needle 3+ Muscles (68113)  [x] Manual (31627) x  1  [] Ultrasound (15552) x  [] TA (29043) x     [] Mech Traction (54694)  [] ES(attended) (00328)     [] ES (un) (44059):   [] Vasopump (08287) [] Ionto (31422)    [] Other:    GOALS:  Patient stated goal: \" I want to walk and function better \"  [x]? Progressing: []? Met: []? Not Met: []? Adjusted     Therapist goals for Patient:   Short Term Goals: To be achieved in: 2 weeks  1. Independent in HEP and progression per patient tolerance, in order to prevent re-injury. []? Progressing: [x]? Met: []? Not Met: []? Adjusted  2.  Patient will have a decrease in pain to facilitate improvement in movement, function, and ADLs as indicated by Functional Deficits. []? Progressing: [x]? Met: []? Not Met: []? Adjusted     Long Term Goals: To be achieved in:8 weeks  1. Disability index score of25% or less for the LEFS to assist with reaching prior level of function. [x]? Progressing: []? Met: []? Not Met: []? Adjusted  2. Patient will demonstrate increased AROM to-5-120 to allow for proper joint functioning as indicated by patients Functional Deficits. [x]? Progressing: []? Met: []? Not Met: []? Adjusted  3. Patient will demonstrate an increase in Strength to good proximal hip strength and control5-/5[]? Progressing: []? Met: []? Not Met: []? Adjusted  4. Patient will return to 75%  functional activities without increased symptoms or restriction. [x]? Progressing: []? Met: []? Not Met: []? Adjusted  5. (patient specific functional goal)    [x]? Progressing: []? Met: []? Not Met: []? Adjusted            ASSESSMENT:  See eval    Treatment/Activity Tolerance:  [x] Patient tolerated treatment well [] Patient limited by fatique  [] Patient limited by pain  [] Patient limited by other medical complications  [] Other:     Overall Progression Towards Functional goals/ Treatment Progress Update:  [] Patient is progressing as expected towards functional goals listed. [] Progression is slowed due to complexities/Impairments listed. [] Progression has been slowed due to co-morbidities. [x] Plan just implemented, too soon to assess goals progression <30days   [] Goals require adjustment due to lack of progress  [] Patient is not progressing as expected and requires additional follow up with physician  [] Other    Prognosis for POC: [x] Good [] Fair  [] Poor    Patient requires continued skilled intervention: [x] Yes  [] No        PLAN: LE arom, prom  strength, proprioception, gait, balance, functional activities. Mfr, joint mobs, mods as needed, hep.  Progress as tolerated, very tight in hams and gastroc, ;lacking extension,  Work on extension and quad control thru the range    [] Continue per plan of care [] Alter current plan (see comments)  [x] Plan of care initiated [] Hold pending MD visit [] Discharge    Electronically signed by: Sharon Kilgore PT    Note: If patient does not return for scheduled/recommended follow up visits, this note will serve as a discharge from care along with the most recent update on progress.

## 2021-04-06 ENCOUNTER — HOSPITAL ENCOUNTER (OUTPATIENT)
Dept: PHYSICAL THERAPY | Age: 71
Setting detail: THERAPIES SERIES
Discharge: HOME | End: 2021-04-06
Payer: MEDICARE

## 2021-04-06 PROCEDURE — 97112 NEUROMUSCULAR REEDUCATION: CPT

## 2021-04-06 PROCEDURE — 97140 MANUAL THERAPY 1/> REGIONS: CPT

## 2021-04-06 PROCEDURE — 97110 THERAPEUTIC EXERCISES: CPT

## 2021-04-06 NOTE — PLAN OF CARE
-                                               Physical Therapy Re-Certification Plan of Care/MD UPDATE      Dear  ,Dr. Katie Bustamante    We had the pleasure of treating the following patient for physical therapy services at [facility]. A summary of our findings can be found in the updated assessment below. This includes our plan of care. If you have any questions or concerns regarding these findings, please do not hesitate to contact me at the office phone number checked above. Thank you for the referral.     Physician Signature:________________________________Date:__________________  By signing above (or electronic signature), therapists plan is approved by physician    Date Range Of Visits: 3/2-  Total Visits to Date: 10  Overall Response to Treatment:   [x]Patient is responding well to treatment and improvement is noted with regards  to goals   []Patient should continue to improve in reasonable time if they continue HEP   []Patient has plateaued and is no longer responding to skilled PT intervention    []Patient is getting worse and would benefit from return to referring MD   []Patient unable to adhere to initial POC   [x]Other: Still stiff with extension but slowly improving      Recommendation:    [x]Continue PT 2-3x / wk for 6-8weeks. []Hold PT, pending MD visit                 [facility].  [place] [address]  Phone: [phone]   Fax:     [phone]      Physical Therapy Treatment Note/ Progress Report:     Date:  2021    Patient Name:  Tae Mckinley    :  1950  MRN: [id number]    Pertinent Medical History:     Patient Name:  Tae Mckinley                         :  1950                     MRN: [id number]     Pertinent Medical History:Additional Pertinent Hx: Meniere's Disease,htn, hyperlipedemia, Depression, oa     Medical/Treatment Diagnosis Information:  · Diagnosis: S/P total knee arthroplasty, left  - Primary  · Treatment Diagnosis: decreased abilty to ambulate and function     Insurance/Certification information:  PT Insurance Information: [facility] Medicare Advantage  Physician Information:  Referring Practitioner: Dr. Tammy Patel of care signed (Y/N): routed        Progress Report: []  Yes  [x]  No     Date Range for reporting period:  Beginnin2021  Ending:      Progress report due (10 Rx/or 30 days whichever is less): 32    Recertification due (POC duration/ or 90 days whichever is less):      Visit # POC/Insurance Allowable Auth Needed   10 20 []Yes   []No     Latex Allergy:  [x]NO      []YES  Preferred Language for Healthcare:   [x]English       []Other:    Functional Scale:      Date assessed: at eval  Test: LEFS-26  Score:    Pain level:  3/10     History of Injury: Patient is a 64 y/o male with a hx of left knee pain with a resultant left tka on 21. He was seen for a few weeks at home. He is ambulating with a cane at this time. He c/o constant aching pain in his left knee which is worse with steps, prolonged walking, and attempted squatting. He is retired. He hopes to return to walking and stretching.         SUBJECTIVE:  Pt states, \" It seems to be doing fairly well \"  3/4/21  Pt states, \" doing pretty good today \"  21 Patient reports knee is improving,still some stiffness and swelling is about the same. 3/11/21  Pt states, \" doing well, just stiff \"  21 Patient reports knee still feels stiff.  3/23/21  Pt states, \" MD said the swelling may be part of the problem, he gave me steroids \"  21 Patient reports knee is feeling better since he started taking steroids. 0/3/30/21 Patient reports knee is doing ok,just stiffness. [place]  with dynasplint set up for extension brace.   21  Pt states, \" wearing the stretcher, not sure if it's doing much yet \" \"  21  Pt states, \"  Increasing  the tension with the stretcher, seems to be helping \"      OBJECTIVE:   Initial- flex-90,  Ext--15, strength-3+/5   03/09/21 flex 108  Ext -12   03/16/21 flex 108 ext -8   03/30/21 flex  115 ext -8     RESTRICTIONS/PRECAUTIONS:    Exercises/Interventions:     Therapeutic Ex (77558)   Min: Reps/Resistance Notes/CUES   Nu step L3 x 6 min    Leg press 100# x 40, left 45# x 40    Heel slides X 2 min , 30 x 3 stretch    clams     Prone flex X 30,  30 x 3     Ext stretch     Qs with towel roll under heel X 3min, assist to end range    saq/laq     Standing hams stretch 60 x 3    incline 60 x 3, with qs    Standing qs X 2 min    pilates press 3sp x 40, left 2 sp x 30                   Manual Intervention (09181)  Min:     Knee mobs/PROM Mfr to quads, hams gastroc, itb, post tib,  Very tight in hams and gastroc as well as itb, medi cups to gastroc  x 6 min and 15 min, Semimembranosus and Tendinosis very tight. Tib/Fem Mobs     Patella Mobs     Ankle mobs               NMR re-education (21948)  Min:  CUES NEEDED   wobble 2 min    Step ups 4\" x 30    Step taps 4\" x 30 Told him to do this at home to help with ext 3/11/21   bosu/ski     Semi squat     sls     Standing qs X 20         Therapeutic Activity (11290)  Min:      Went over gait with cane trying to reach full ext         Modalities  Min:     IFC with      CP after exercises     MH after exercises            Other Therapeutic Activities: Pt was educated on PT POC, Diagnosis, Prognosis, pathomechanics as well as frequency and duration of scheduling future physical therapy appointments. Time was also taken on this day to answer all patient questions and participation in PT. Reviewed appointment policy in detail with patient and patient verbalized understanding. Home Exercise Program: Patient was instructed in the following for HEP:     . Patient verbalized/demonstrated understanding and was issued written handout.   Seated hamstring Stretch 30 sec x 5  Seated Calf Stretch           30 sec x 5  Seated Flexion Stretch      30 sec x 5  Seated Heel Slide  x30  Long Arc Quad      x 30  Quad Set              x 30  Prone Flexion       x 20  Straight Leg Raise   x 20  Glut Set                  x 20  Ankle Pump          x 20  Semi Squat         x 20        Therapeutic Exercise and NMR EXR  [] (61365) Provided verbal/tactile cueing for activities related to strengthening, flexibility, endurance, ROM for improvements in LE, proximal hip, and core control with self care, mobility, lifting, ambulation.  [] (82421) Provided verbal/tactile cueing for activities related to improving balance, coordination, kinesthetic sense, posture, motor skill, proprioception  to assist with LE, proximal hip, and core control in self care, mobility, lifting, ambulation and eccentric single leg control.      NMR and Therapeutic Activities:    [] (38848 or 42823) Provided verbal/tactile cueing for activities related to improving balance, coordination, kinesthetic sense, posture, motor skill, proprioception and motor activation to allow for proper function of core, proximal hip and LE with self care and ADLs and functional mobility.   [] (55404) Gait Re-education- Provided training and instruction to the patient for proper LE, core and proximal hip recruitment and positioning and eccentric body weight control with ambulation re-education including up and down stairs     Home Exercise Program:    [x] (82848) Reviewed/Progressed HEP activities related to strengthening, flexibility, endurance, ROM of core, proximal hip and LE for functional self-care, mobility, lifting and ambulation/stair navigation   [] (84651)Reviewed/Progressed HEP activities related to improving balance, coordination, kinesthetic sense, posture, motor skill, proprioception of core, proximal hip and LE for self care, mobility, lifting, and ambulation/stair navigation      Manual Treatments:  PROM / STM / Oscillations-Mobs:  G-I, II, III, IV (PA's, Inf., Post.)  [] (42712) Provided manual therapy to mobilize LE, proximal hip and/or LS spine soft tissue/joints for the purpose of modulating pain, promoting relaxation,  increasing ROM, reducing/eliminating soft tissue swelling/inflammation/restriction, improving soft tissue extensibility and allowing for proper ROM for normal function with self care, mobility, lifting and ambulation. Charges:  Timed Code Treatment Minutes: 60   Total Treatment Minutes: 60      [] EVAL (LOW) 84525 (typically 20 minutes face-to-face)  [] EVAL (MOD) 67905 (typically 30 minutes face-to-face)  [] EVAL (HIGH) 63795 (typically 45 minutes face-to-face)  [] RE-EVAL     [x] NO(94523) x2  [] Dry needle 1 or 2 Muscles (38265)  [x] NMR (63067) x 1    [] Dry needle 3+ Muscles (33524)  [x] Manual (84555) x  1  [] Ultrasound (39015) x  [] TA (56404) x     [] Mech Traction (75863)  [] ES(attended) (31898)     [] ES (un) (96607):   [] Vasopump (40651) [] Ionto (73717)    [] Other:    GOALS:  Patient stated goal: \" I want to walk and function better \"  [x]? Progressing: []? Met: []? Not Met: []? Adjusted     Therapist goals for Patient:   Short Term Goals: To be achieved in: 2 weeks  1. Independent in HEP and progression per patient tolerance, in order to prevent re-injury. []? Progressing: [x]? Met: []? Not Met: []? Adjusted  2. Patient will have a decrease in pain to facilitate improvement in movement, function, and ADLs as indicated by Functional Deficits. []? Progressing: [x]? Met: []? Not Met: []? Adjusted     Long Term Goals: To be achieved in:8 weeks  1. Disability index score of25% or less for the LEFS to assist with reaching prior level of function. [x]? Progressing: []? Met: []? Not Met: []? Adjusted  2. Patient will demonstrate increased AROM to-5-120 to allow for proper joint functioning as indicated by patients Functional Deficits. [x]? Progressing: []? Met: []? Not Met: []? Adjusted  3.  Patient will demonstrate an increase in Strength to good proximal hip strength and control5-/5[]? Progressing: []? Met: []? Not Met: []? Adjusted  4. Patient will return to 75%  functional activities without increased symptoms or restriction. [x]? Progressing: []? Met: []? Not Met: []? Adjusted  5. (patient specific functional goal)    [x]? Progressing: []? Met: []? Not Met: []? Adjusted            ASSESSMENT:  See eval    Treatment/Activity Tolerance:  [x] Patient tolerated treatment well [] Patient limited by fatique  [] Patient limited by pain  [] Patient limited by other medical complications  [] Other:     Overall Progression Towards Functional goals/ Treatment Progress Update:  [] Patient is progressing as expected towards functional goals listed. [] Progression is slowed due to complexities/Impairments listed. [] Progression has been slowed due to co-morbidities. [x] Plan just implemented, too soon to assess goals progression <30days   [] Goals require adjustment due to lack of progress  [] Patient is not progressing as expected and requires additional follow up with physician  [] Other    Prognosis for POC: [x] Good [] Fair  [] Poor    Patient requires continued skilled intervention: [x] Yes  [] No        PLAN: LE arom, prom  strength, proprioception, gait, balance, functional activities. Mfr, joint mobs, mods as needed, hep. Progress as tolerated, very tight in hams and gastroc, ;lacking extension,  Work on extension and quad control thru the range    [] Continue per plan of care [] Alter current plan (see comments)  [x] Plan of care initiated [] Hold pending MD visit [] Discharge    Electronically signed by: Ary Cuellar, PT    Note: If patient does not return for scheduled/recommended follow up visits, this note will serve as a discharge from care along with the most recent update on progress.

## 2021-04-08 ENCOUNTER — HOSPITAL ENCOUNTER (OUTPATIENT)
Dept: PHYSICAL THERAPY | Age: 71
Setting detail: THERAPIES SERIES
Discharge: HOME | End: 2021-04-08
Payer: MEDICARE

## 2021-04-08 PROCEDURE — 97110 THERAPEUTIC EXERCISES: CPT

## 2021-04-08 PROCEDURE — 97140 MANUAL THERAPY 1/> REGIONS: CPT

## 2021-04-08 PROCEDURE — 97112 NEUROMUSCULAR REEDUCATION: CPT

## 2021-04-08 NOTE — FLOWSHEET NOTE
-                                                           Physical Therapy Treatment Note/ Progress Report:     Date:  2021    Patient Name:  Kate Angel    :  1950  MRN: [id number]    Pertinent Medical History:     Patient Name:  Kate Angel                         :  1950                     MRN: [id number]     Pertinent Medical History:Additional Pertinent Hx: Meniere's Disease,htn, hyperlipedemia, Depression, oa     Medical/Treatment Diagnosis Information:  · Diagnosis: S/P total knee arthroplasty, left  - Primary  · Treatment Diagnosis: decreased abilty to ambulate and function     Insurance/Certification information:  PT Insurance Information: [facility] Medicare Advantage  Physician Information:  Referring Practitioner: Dr. Sofi Mercado of care signed (Y/N): routed        Progress Report: []  Yes  [x]  No     Date Range for reporting period:  Beginnin2021  Ending:      Progress report due (10 Rx/or 30 days whichever is less): 7/7/10    Recertification due (POC duration/ or 90 days whichever is less):      Visit # POC/Insurance Allowable Auth Needed   11  []Yes   []No     Latex Allergy:  [x]NO      []YES  Preferred Language for Healthcare:   [x]English       []Other:    Functional Scale:      Date assessed: at eval  Test: LEFS-26  Score:    Pain level:  3/10     History of Injury: Patient is a 66 y/o male with a hx of left knee pain with a resultant left tka on 21. He was seen for a few weeks at home. He is ambulating with a cane at this time. He c/o constant aching pain in his left knee which is worse with steps, prolonged walking, and attempted squatting. He is retired. He hopes to return to walking and stretching.         SUBJECTIVE:  Pt states, \" It seems to be doing fairly well \"  3/4/21  Pt states, \" doing pretty good today \"  21 Patient reports knee is improving,still some stiffness and swelling is about the same.   3/11/21  Pt states, \" doing well, just stiff \"  03/16/21 Patient reports knee still feels stiff.  3/23/21  Pt states, \" MD said the swelling may be part of the problem, he gave me steroids \"  03/25/21 Patient reports knee is feeling better since he started taking steroids. 0/3/30/21 Patient reports knee is doing ok,just stiffness. [facility]  with dynasplint set up for extension brace. 4/1/21  Pt states, \" wearing the stretcher, not sure if it's doing much yet \" \"  4/6/21  Pt states, \"  Increasing  the tension with the stretcher, seems to be helping \"  04/08/21 Patient reports knee is doing ok,has been using his stretcher at home. OBJECTIVE:   Initial- flex-90,  Ext--15, strength-3+/5   03/09/21 flex 108  Ext -12   03/16/21 flex 108 ext -8   03/30/21 flex  115 ext -8     RESTRICTIONS/PRECAUTIONS:    Exercises/Interventions:     Therapeutic Ex (84271)   Min: Reps/Resistance Notes/CUES   Nu step L3 x 6 min    Leg press 100# x 40, left 45# x 40    Heel slides X 2 min , 30 x 3 stretch    clams     Prone flex X 30,  30 x 3     Ext stretch     Qs with towel roll under heel X 3min, assist to end range    saq/laq     Standing hams stretch 60 x 3    incline 60 x 3, with qs    Standing qs X 2 min    pilates press 3sp x 40, left 2 sp x 30                   Manual Intervention (86110)  Min:     Knee mobs/PROM Mfr to quads, hams gastroc, itb, post tib,  Very tight in hams and gastroc as well as itb, medi cups to gastroc  x 6 min and 15 min, Semimembranosus and Tendinosis very tight.     Tib/Fem Mobs     Patella Mobs     Ankle mobs               NMR re-education (83870)  Min:  CUES NEEDED   wobble 2 min    Step ups 4\" x 30    Step taps 4\" x 30 Told him to do this at home to help with ext 3/11/21   bosu/ski     Semi squat     sls     Standing qs X 20         Therapeutic Activity (84540)  Min:      Went over gait with cane trying to reach full ext         Modalities  Min:     IFC with      CP after exercises     MH after exercises            Other Therapeutic Activities: Pt was educated on PT POC, Diagnosis, Prognosis, pathomechanics as well as frequency and duration of scheduling future physical therapy appointments. Time was also taken on this day to answer all patient questions and participation in PT. Reviewed appointment policy in detail with patient and patient verbalized understanding. Home Exercise Program: Patient was instructed in the following for HEP:     . Patient verbalized/demonstrated understanding and was issued written handout. Seated hamstring Stretch 30 sec x 5  Seated Calf Stretch           30 sec x 5  Seated Flexion Stretch      30 sec x 5  Seated Heel Slide  x30  Long Arc Quad      x 30  Quad Set              x 30  Prone Flexion       x 20  Straight Leg Raise   x 20  Glut Set                  x 20  Ankle Pump          x 20  Semi Squat         x 20        Therapeutic Exercise and NMR EXR  [] (52304) Provided verbal/tactile cueing for activities related to strengthening, flexibility, endurance, ROM for improvements in LE, proximal hip, and core control with self care, mobility, lifting, ambulation.  [] (42172) Provided verbal/tactile cueing for activities related to improving balance, coordination, kinesthetic sense, posture, motor skill, proprioception  to assist with LE, proximal hip, and core control in self care, mobility, lifting, ambulation and eccentric single leg control.      NMR and Therapeutic Activities:    [] (34262 or 09419) Provided verbal/tactile cueing for activities related to improving balance, coordination, kinesthetic sense, posture, motor skill, proprioception and motor activation to allow for proper function of core, proximal hip and LE with self care and ADLs and functional mobility.   [] (65823) Gait Re-education- Provided training and instruction to the patient for proper LE, core and proximal hip recruitment and positioning and eccentric body weight control with ambulation re-education including up and down stairs Home Exercise Program:    [x] (68794) Reviewed/Progressed HEP activities related to strengthening, flexibility, endurance, ROM of core, proximal hip and LE for functional self-care, mobility, lifting and ambulation/stair navigation   [] (13906)Reviewed/Progressed HEP activities related to improving balance, coordination, kinesthetic sense, posture, motor skill, proprioception of core, proximal hip and LE for self care, mobility, lifting, and ambulation/stair navigation      Manual Treatments:  PROM / STM / Oscillations-Mobs:  G-I, II, III, IV (PA's, Inf., Post.)  [] (20060) Provided manual therapy to mobilize LE, proximal hip and/or LS spine soft tissue/joints for the purpose of modulating pain, promoting relaxation,  increasing ROM, reducing/eliminating soft tissue swelling/inflammation/restriction, improving soft tissue extensibility and allowing for proper ROM for normal function with self care, mobility, lifting and ambulation. Charges:  Timed Code Treatment Minutes: 60   Total Treatment Minutes: 60      [] EVAL (LOW) 70100 (typically 20 minutes face-to-face)  [] EVAL (MOD) 14934 (typically 30 minutes face-to-face)  [] EVAL (HIGH) 94165 (typically 45 minutes face-to-face)  [] RE-EVAL     [x] BM(65169) x2  [] Dry needle 1 or 2 Muscles (03376)  [x] NMR (94770) x 1    [] Dry needle 3+ Muscles (63175)  [x] Manual (76154) x  1  [] Ultrasound (85674) x  [] TA (08871) x     [] Mech Traction (84045)  [] ES(attended) (68981)     [] ES (un) (96953):   [] Vasopump (10762) [] Ionto (35777)    [] Other:    GOALS:  Patient stated goal: \" I want to walk and function better \"  [x]? Progressing: []? Met: []? Not Met: []? Adjusted     Therapist goals for Patient:   Short Term Goals: To be achieved in: 2 weeks  1. Independent in HEP and progression per patient tolerance, in order to prevent re-injury. []? Progressing: [x]? Met: []? Not Met: []? Adjusted  2.  Patient will have a decrease in pain to facilitate improvement in movement, function, and ADLs as indicated by Functional Deficits. []? Progressing: [x]? Met: []? Not Met: []? Adjusted     Long Term Goals: To be achieved in:8 weeks  1. Disability index score of25% or less for the LEFS to assist with reaching prior level of function. [x]? Progressing: []? Met: []? Not Met: []? Adjusted  2. Patient will demonstrate increased AROM to-5-120 to allow for proper joint functioning as indicated by patients Functional Deficits. [x]? Progressing: []? Met: []? Not Met: []? Adjusted  3. Patient will demonstrate an increase in Strength to good proximal hip strength and control5-/5[]? Progressing: []? Met: []? Not Met: []? Adjusted  4. Patient will return to 75%  functional activities without increased symptoms or restriction. [x]? Progressing: []? Met: []? Not Met: []? Adjusted  5. (patient specific functional goal)    [x]? Progressing: []? Met: []? Not Met: []? Adjusted            ASSESSMENT:  See eval    Treatment/Activity Tolerance:  [x] Patient tolerated treatment well [] Patient limited by fatique  [] Patient limited by pain  [] Patient limited by other medical complications  [] Other:     Overall Progression Towards Functional goals/ Treatment Progress Update:  [] Patient is progressing as expected towards functional goals listed. [] Progression is slowed due to complexities/Impairments listed. [] Progression has been slowed due to co-morbidities. [x] Plan just implemented, too soon to assess goals progression <30days   [] Goals require adjustment due to lack of progress  [] Patient is not progressing as expected and requires additional follow up with physician  [] Other    Prognosis for POC: [x] Good [] Fair  [] Poor    Patient requires continued skilled intervention: [x] Yes  [] No        PLAN: LE arom, prom  strength, proprioception, gait, balance, functional activities. Mfr, joint mobs, mods as needed, hep.  Progress as tolerated, very tight in hams and gastroc, ;lacking extension,  Work on extension and quad control thru the range    [] Continue per plan of care [] Alter current plan (see comments)  [x] Plan of care initiated [] Hold pending MD visit [] Discharge    Electronically signed by: Lor Tena PTA    Note: If patient does not return for scheduled/recommended follow up visits, this note will serve as a discharge from care along with the most recent update on progress.

## 2021-04-13 ENCOUNTER — HOSPITAL ENCOUNTER (OUTPATIENT)
Dept: PHYSICAL THERAPY | Age: 71
Setting detail: THERAPIES SERIES
Discharge: HOME | End: 2021-04-13
Payer: MEDICARE

## 2021-04-13 PROCEDURE — 97112 NEUROMUSCULAR REEDUCATION: CPT

## 2021-04-13 PROCEDURE — 97110 THERAPEUTIC EXERCISES: CPT

## 2021-04-13 PROCEDURE — 97140 MANUAL THERAPY 1/> REGIONS: CPT

## 2021-04-13 NOTE — FLOWSHEET NOTE
just stiff \"  03/16/21 Patient reports knee still feels stiff.  3/23/21  Pt states, \" MD said the swelling may be part of the problem, he gave me steroids \"  03/25/21 Patient reports knee is feeling better since he started taking steroids. 0/3/30/21 Patient reports knee is doing ok,just stiffness. [facility]  with dynasplint set up for extension brace. 4/1/21  Pt states, \" wearing the stretcher, not sure if it's doing much yet \" \"  4/6/21  Pt states, \"  Increasing  the tension with the stretcher, seems to be helping \"  04/08/21 Patient reports knee is doing ok,has been using his stretcher at home. 4/13/21  Pt states, \" doing pretty good \"      OBJECTIVE:   Initial- flex-90,  Ext--15, strength-3+/5   03/09/21 flex 108  Ext -12   03/16/21 flex 108 ext -8   03/30/21 flex  115 ext -8     RESTRICTIONS/PRECAUTIONS:    Exercises/Interventions:     Therapeutic Ex (20480)   Min: Reps/Resistance Notes/CUES   Nu step L3 x 6 min    Leg press 100# x 40, left 45# x 40    Heel slides X 2 min , 30 x 3 stretch    clams     Prone flex X 30,  30 x 3     Ext stretch     Qs with towel roll under heel X 3min, assist to end range    saq/laq     Standing hams stretch 60 x 3    incline 60 x 3, with qs    Standing qs X 2 min    pilates press 3sp x 40, left 2 sp x 30                   Manual Intervention (12090)  Min:     Knee mobs/PROM Mfr to quads, hams gastroc, itb, post tib,  Very tight in hams and gastroc as well as itb, medi cups to gastroc  x 6 min and 15 min, Semimembranosus and Tendinosis very tight.     Tib/Fem Mobs     Patella Mobs     Ankle mobs               NMR re-education (25661)  Min:  CUES NEEDED   wobble 2 min ea    Step ups 4\" x 30    Step taps 4\" x 30 Told him to do this at home to help with ext 3/11/21   bosu/ski     Semi squat     sls     Standing qs X 20    Step thru X 2 min                    Therapeutic Activity (75832)  Min:      Went over gait with cane trying to reach full ext         Modalities  Min:     IFC with CP after exercises     MH after exercises            Other Therapeutic Activities: Pt was educated on PT POC, Diagnosis, Prognosis, pathomechanics as well as frequency and duration of scheduling future physical therapy appointments. Time was also taken on this day to answer all patient questions and participation in PT. Reviewed appointment policy in detail with patient and patient verbalized understanding. Home Exercise Program: Patient was instructed in the following for HEP:     . Patient verbalized/demonstrated understanding and was issued written handout. Seated hamstring Stretch 30 sec x 5  Seated Calf Stretch           30 sec x 5  Seated Flexion Stretch      30 sec x 5  Seated Heel Slide  x30  Long Arc Quad      x 30  Quad Set              x 30  Prone Flexion       x 20  Straight Leg Raise   x 20  Glut Set                  x 20  Ankle Pump          x 20  Semi Squat         x 20        Therapeutic Exercise and NMR EXR  [] (01675) Provided verbal/tactile cueing for activities related to strengthening, flexibility, endurance, ROM for improvements in LE, proximal hip, and core control with self care, mobility, lifting, ambulation.  [] (96810) Provided verbal/tactile cueing for activities related to improving balance, coordination, kinesthetic sense, posture, motor skill, proprioception  to assist with LE, proximal hip, and core control in self care, mobility, lifting, ambulation and eccentric single leg control.      NMR and Therapeutic Activities:    [] (31149 or 56134) Provided verbal/tactile cueing for activities related to improving balance, coordination, kinesthetic sense, posture, motor skill, proprioception and motor activation to allow for proper function of core, proximal hip and LE with self care and ADLs and functional mobility.   [] (60047) Gait Re-education- Provided training and instruction to the patient for proper LE, core and proximal hip recruitment and positioning and eccentric body weight control with ambulation re-education including up and down stairs     Home Exercise Program:    [x] (76295) Reviewed/Progressed HEP activities related to strengthening, flexibility, endurance, ROM of core, proximal hip and LE for functional self-care, mobility, lifting and ambulation/stair navigation   [] (93960)Reviewed/Progressed HEP activities related to improving balance, coordination, kinesthetic sense, posture, motor skill, proprioception of core, proximal hip and LE for self care, mobility, lifting, and ambulation/stair navigation      Manual Treatments:  PROM / STM / Oscillations-Mobs:  G-I, II, III, IV (PA's, Inf., Post.)  [] (27808) Provided manual therapy to mobilize LE, proximal hip and/or LS spine soft tissue/joints for the purpose of modulating pain, promoting relaxation,  increasing ROM, reducing/eliminating soft tissue swelling/inflammation/restriction, improving soft tissue extensibility and allowing for proper ROM for normal function with self care, mobility, lifting and ambulation. Charges:  Timed Code Treatment Minutes: 60   Total Treatment Minutes: 60      [] EVAL (LOW) 58492 (typically 20 minutes face-to-face)  [] EVAL (MOD) 66726 (typically 30 minutes face-to-face)  [] EVAL (HIGH) 11094 (typically 45 minutes face-to-face)  [] RE-EVAL     [x] TC(78860) x2  [] Dry needle 1 or 2 Muscles (96236)  [x] NMR (33537) x 1    [] Dry needle 3+ Muscles (34557)  [x] Manual (24941) x  1  [] Ultrasound (04706) x  [] TA (61500) x     [] Mech Traction (83654)  [] ES(attended) (16783)     [] ES (un) (44238):   [] Vasopump (73561) [] Ionto (80628)    [] Other:    GOALS:  Patient stated goal: \" I want to walk and function better \"  [x]? Progressing: []? Met: []? Not Met: []? Adjusted     Therapist goals for Patient:   Short Term Goals: To be achieved in: 2 weeks  1. Independent in HEP and progression per patient tolerance, in order to prevent re-injury. []? Progressing: [x]? Met: []?  Not Met: []? Adjusted  2. Patient will have a decrease in pain to facilitate improvement in movement, function, and ADLs as indicated by Functional Deficits. []? Progressing: [x]? Met: []? Not Met: []? Adjusted     Long Term Goals: To be achieved in:8 weeks  1. Disability index score of25% or less for the LEFS to assist with reaching prior level of function. [x]? Progressing: []? Met: []? Not Met: []? Adjusted  2. Patient will demonstrate increased AROM to-5-120 to allow for proper joint functioning as indicated by patients Functional Deficits. [x]? Progressing: []? Met: []? Not Met: []? Adjusted  3. Patient will demonstrate an increase in Strength to good proximal hip strength and control5-/5[]? Progressing: []? Met: []? Not Met: []? Adjusted  4. Patient will return to 75%  functional activities without increased symptoms or restriction. [x]? Progressing: []? Met: []? Not Met: []? Adjusted  5. (patient specific functional goal)    [x]? Progressing: []? Met: []? Not Met: []? Adjusted            ASSESSMENT:  See eval    Treatment/Activity Tolerance:  [x] Patient tolerated treatment well [] Patient limited by fatique  [] Patient limited by pain  [] Patient limited by other medical complications  [] Other:     Overall Progression Towards Functional goals/ Treatment Progress Update:  [] Patient is progressing as expected towards functional goals listed. [] Progression is slowed due to complexities/Impairments listed. [] Progression has been slowed due to co-morbidities. [x] Plan just implemented, too soon to assess goals progression <30days   [] Goals require adjustment due to lack of progress  [] Patient is not progressing as expected and requires additional follow up with physician  [] Other    Prognosis for POC: [x] Good [] Fair  [] Poor    Patient requires continued skilled intervention: [x] Yes  [] No        PLAN: LE arom, prom  strength, proprioception, gait, balance, functional activities.   Mfr, joint mobs, mods as needed, hep. Progress as tolerated, very tight in hams and gastroc, ;lacking extension,  Work on extension and quad control thru the range    [] Continue per plan of care [] Alter current plan (see comments)  [x] Plan of care initiated [] Hold pending MD visit [] Discharge    Electronically signed by: Iraj Valenzuela PT    Note: If patient does not return for scheduled/recommended follow up visits, this note will serve as a discharge from care along with the most recent update on progress.

## 2021-04-15 ENCOUNTER — HOSPITAL ENCOUNTER (OUTPATIENT)
Dept: PHYSICAL THERAPY | Age: 71
Setting detail: THERAPIES SERIES
Discharge: HOME | End: 2021-04-15
Payer: MEDICARE

## 2021-04-15 PROCEDURE — 97112 NEUROMUSCULAR REEDUCATION: CPT

## 2021-04-15 PROCEDURE — 97110 THERAPEUTIC EXERCISES: CPT

## 2021-04-15 PROCEDURE — 97140 MANUAL THERAPY 1/> REGIONS: CPT

## 2021-04-15 NOTE — FLOWSHEET NOTE
-                                                           Physical Therapy Treatment Note/ Progress Report:     Date:  4/15/2021    Patient Name:  Taco Gutierrez    :  1950  MRN: [id number]    Pertinent Medical History:     Patient Name:  Taco Gutierrez YOB: 1950                     MRN: [id number]     Pertinent Medical History:Additional Pertinent Hx: Meniere's Disease,htn, hyperlipedemia, Depression, oa     Medical/Treatment Diagnosis Information:  · Diagnosis: S/P total knee arthroplasty, left  - Primary  · Treatment Diagnosis: decreased abilty to ambulate and function     Insurance/Certification information:  PT Insurance Information: [facility] Medicare Advantage  Physician Information:  Referring Practitioner: Dr. Andrea Silva of care signed (Y/N): routed        Progress Report: []  Yes  [x]  No     Date Range for reporting period:  Beginnin/15/2021  Ending:      Progress report due (10 Rx/or 30 days whichever is less):     Recertification due (POC duration/ or 90 days whichever is less):      Visit # POC/Insurance Allowable Auth Needed   13  []Yes   []No     Latex Allergy:  [x]NO      []YES  Preferred Language for Healthcare:   [x]English       []Other:    Functional Scale:      Date assessed: at eval  Test: LEFS-26  Score:    Pain level:  310     History of Injury: Patient is a 66 y/o male with a hx of left knee pain with a resultant left tka on 21. He was seen for a few weeks at home. He is ambulating with a cane at this time. He c/o constant aching pain in his left knee which is worse with steps, prolonged walking, and attempted squatting. He is retired. He hopes to return to walking and stretching.         SUBJECTIVE:  Pt states, \" It seems to be doing fairly well \"  3/4/21  Pt states, \" doing pretty good today \"  21 Patient reports knee is improving,still some stiffness and swelling is about the same.   3/11/21  Pt states, \" doing well, just stiff \"  03/16/21 Patient reports knee still feels stiff.  3/23/21  Pt states, \" MD said the swelling may be part of the problem, he gave me steroids \"  03/25/21 Patient reports knee is feeling better since he started taking steroids. 0/3/30/21 Patient reports knee is doing ok,just stiffness. Scarlett Huber  with dynasplint set up for extension brace. 4/1/21  Pt states, \" wearing the stretcher, not sure if it's doing much yet \" \"  4/6/21  Pt states, \"  Increasing  the tension with the stretcher, seems to be helping \"  04/08/21 Patient reports knee is doing ok,has been using his stretcher at home. 4/13/21  Pt states, \" doing pretty good \"  4/15/21  Pt states, \" still not straight , otherwise good, the stretcher at home doesn't seem to be helping \"      OBJECTIVE:   Initial- flex-90,  Ext--15, strength-3+/5   03/09/21 flex 108  Ext -12   03/16/21 flex 108 ext -8   03/30/21 flex  115 ext -8     RESTRICTIONS/PRECAUTIONS:    Exercises/Interventions:     Therapeutic Ex (68410)   Min: Reps/Resistance Notes/CUES   Nu step L3 x 6 min    Leg press 100# x 40, left 45# x 40    Heel slides X 2 min , 30 x 3 stretch    clams     Prone flex X 30,  30 x 3     Ext stretch     Qs with towel roll under heel X 3min, assist to end range    saq/laq     Standing hams stretch 60 x 3    incline 60 x 3, with qs    Standing qs X 2 min    pilates hams stretch X 2 min    pilates press 3sp x 40, left 2 sp x 30    Prone hang 60 x 3 Added to hep 4/15/21             Manual Intervention (23806)  Min:     Knee mobs/PROM Mfr to quads, hams gastroc, itb, post tib,  Very tight in hams and gastroc as well as itb, medi cups to gastroc  x 6 min and 15 min, Semimembranosus and Tendinosis very tight.     Tib/Fem Mobs     Patella Mobs     Ankle mobs               NMR re-education (88155)  Min:  CUES NEEDED   wobble 2 min ea    Step ups 4\" x 30    Step taps 4\" x 30 Told him to do this at home to help with ext 3/11/21   bosu/ski X 2 min    Semi squat     sls Standing qs X 20    Step thru X 2 min          Pro stretch 60 x 2         Therapeutic Activity (09456)  Min:      Went over gait with cane trying to reach full ext         Modalities  Min:     IFC with      CP after exercises     MH after exercises            Other Therapeutic Activities: Pt was educated on PT POC, Diagnosis, Prognosis, pathomechanics as well as frequency and duration of scheduling future physical therapy appointments. Time was also taken on this day to answer all patient questions and participation in PT. Reviewed appointment policy in detail with patient and patient verbalized understanding. Home Exercise Program: Patient was instructed in the following for HEP:     . Patient verbalized/demonstrated understanding and was issued written handout. Seated hamstring Stretch 30 sec x 5  Seated Calf Stretch           30 sec x 5  Seated Flexion Stretch      30 sec x 5  Seated Heel Slide  x30  Long Arc Quad      x 30  Quad Set              x 30  Prone Flexion       x 20  Straight Leg Raise   x 20  Glut Set                  x 20  Ankle Pump          x 20  Semi Squat         x 20        Therapeutic Exercise and NMR EXR  [] (32593) Provided verbal/tactile cueing for activities related to strengthening, flexibility, endurance, ROM for improvements in LE, proximal hip, and core control with self care, mobility, lifting, ambulation.  [] (30569) Provided verbal/tactile cueing for activities related to improving balance, coordination, kinesthetic sense, posture, motor skill, proprioception  to assist with LE, proximal hip, and core control in self care, mobility, lifting, ambulation and eccentric single leg control.      NMR and Therapeutic Activities:    [] (48116 or 08041) Provided verbal/tactile cueing for activities related to improving balance, coordination, kinesthetic sense, posture, motor skill, proprioception and motor activation to allow for proper function of core, proximal hip and LE with self care and ADLs and functional mobility.   [] (92072) Gait Re-education- Provided training and instruction to the patient for proper LE, core and proximal hip recruitment and positioning and eccentric body weight control with ambulation re-education including up and down stairs     Home Exercise Program:    [x] (83523) Reviewed/Progressed HEP activities related to strengthening, flexibility, endurance, ROM of core, proximal hip and LE for functional self-care, mobility, lifting and ambulation/stair navigation   [] (27506)Reviewed/Progressed HEP activities related to improving balance, coordination, kinesthetic sense, posture, motor skill, proprioception of core, proximal hip and LE for self care, mobility, lifting, and ambulation/stair navigation      Manual Treatments:  PROM / STM / Oscillations-Mobs:  G-I, II, III, IV (PA's, Inf., Post.)  [] (29309) Provided manual therapy to mobilize LE, proximal hip and/or LS spine soft tissue/joints for the purpose of modulating pain, promoting relaxation,  increasing ROM, reducing/eliminating soft tissue swelling/inflammation/restriction, improving soft tissue extensibility and allowing for proper ROM for normal function with self care, mobility, lifting and ambulation. Charges:  Timed Code Treatment Minutes: 60   Total Treatment Minutes: 60      [] EVAL (LOW) 86279 (typically 20 minutes face-to-face)  [] EVAL (MOD) 41355 (typically 30 minutes face-to-face)  [] EVAL (HIGH) 12920 (typically 45 minutes face-to-face)  [] RE-EVAL     [x] DG(67253) x2  [] Dry needle 1 or 2 Muscles (73360)  [x] NMR (77977) x 1    [] Dry needle 3+ Muscles (43357)  [x] Manual (03554) x  1  [] Ultrasound (59805) x  [] TA (35765) x     [] Mech Traction (79854)  [] ES(attended) (96854)     [] ES (un) (33583):   [] Vasopump (23958) [] Ionto (39446)    [] Other:    GOALS:  Patient stated goal: \" I want to walk and function better \"  [x]? Progressing: []? Met: []? Not Met: []?  Adjusted     Therapist goals for Patient:   Short Term Goals: To be achieved in: 2 weeks  1. Independent in HEP and progression per patient tolerance, in order to prevent re-injury. []? Progressing: [x]? Met: []? Not Met: []? Adjusted  2. Patient will have a decrease in pain to facilitate improvement in movement, function, and ADLs as indicated by Functional Deficits. []? Progressing: [x]? Met: []? Not Met: []? Adjusted     Long Term Goals: To be achieved in:8 weeks  1. Disability index score of25% or less for the LEFS to assist with reaching prior level of function. [x]? Progressing: []? Met: []? Not Met: []? Adjusted  2. Patient will demonstrate increased AROM to-5-120 to allow for proper joint functioning as indicated by patients Functional Deficits. [x]? Progressing: []? Met: []? Not Met: []? Adjusted  3. Patient will demonstrate an increase in Strength to good proximal hip strength and control5-/5[]? Progressing: []? Met: []? Not Met: []? Adjusted  4. Patient will return to 75%  functional activities without increased symptoms or restriction. [x]? Progressing: []? Met: []? Not Met: []? Adjusted  5. (patient specific functional goal)    [x]? Progressing: []? Met: []? Not Met: []? Adjusted            ASSESSMENT:  See eval    Treatment/Activity Tolerance:  [x] Patient tolerated treatment well [] Patient limited by fatique  [] Patient limited by pain  [] Patient limited by other medical complications  [] Other:     Overall Progression Towards Functional goals/ Treatment Progress Update:  [] Patient is progressing as expected towards functional goals listed. [] Progression is slowed due to complexities/Impairments listed. [] Progression has been slowed due to co-morbidities.   [x] Plan just implemented, too soon to assess goals progression <30days   [] Goals require adjustment due to lack of progress  [] Patient is not progressing as expected and requires additional follow up with physician  [] Other    Prognosis for POC: [x] Good [] Fair  [] Poor    Patient requires continued skilled intervention: [x] Yes  [] No        PLAN: LE arom, prom  strength, proprioception, gait, balance, functional activities. Mfr, joint mobs, mods as needed, hep. Progress as tolerated, very tight in hams and gastroc, ;lacking extension,  Work on extension and quad control thru the range    [] Continue per plan of care [] Alter current plan (see comments)  [x] Plan of care initiated [] Hold pending MD visit [] Discharge    Electronically signed by: Elida Rondon PT    Note: If patient does not return for scheduled/recommended follow up visits, this note will serve as a discharge from care along with the most recent update on progress.

## 2021-04-20 ENCOUNTER — HOSPITAL ENCOUNTER (OUTPATIENT)
Dept: PHYSICAL THERAPY | Age: 71
Setting detail: THERAPIES SERIES
Discharge: HOME | End: 2021-04-20
Payer: MEDICARE

## 2021-04-20 PROCEDURE — 97140 MANUAL THERAPY 1/> REGIONS: CPT

## 2021-04-20 PROCEDURE — 97110 THERAPEUTIC EXERCISES: CPT

## 2021-04-20 PROCEDURE — 97112 NEUROMUSCULAR REEDUCATION: CPT

## 2021-04-20 NOTE — FLOWSHEET NOTE
[facility] - Outpatient Rehabilitation & Therapy  [address]. Oli Guillen  Phone: [phone]   Fax:     [phone]     Physical Therapy Discharge Summary    Dear  ,    We had the pleasure of treating the following patient for physical therapy services at [address] [place] [address]. A summary of our findings can be found in the discharge summary below. If you have any questions or concerns regarding these findings, please do not hesitate to contact me at the office phone number above.   Thank you for the referral.     Physician Signature:________________________________Date:__________________  By signing above (or electronic signature), therapists plan is approved by physician      Overall Response to Treatment:   []Patient is responding well to treatment and improvement is noted with regards  to goals   [x]Patient should continue to improve in reasonable time if they continue HEP   []Patient has plateaued and is no longer responding to skilled PT intervention    []Patient is getting worse and would benefit from return to referring MD   []Patient unable to adhere to initial POC      Date range of Visits:3/21-Total Visits: 14            Physical Therapy Treatment Note/ Progress Report:     Date:  2021    Patient Name:  Adán Thomas    :  1950  MRN: [id number]    Pertinent Medical History:     Patient Name:  Adán Thomas                         :  1950                     MRN: [id number]     Pertinent Medical History:Additional Pertinent Hx: Meniere's Disease,htn, hyperlipedemia, Depression, oa     Medical/Treatment Diagnosis Information:  · Diagnosis: S/P total knee arthroplasty, left  - Primary  · Treatment Diagnosis: decreased abilty to ambulate and function     Insurance/Certification information:  PT Insurance Information: [facility] Medicare Advantage  Physician Information:  Referring Practitioner: Dr. Tierney Joy of care signed (Y/N): routed        Progress Report: []  Yes  [x]  No     Date Range for reporting period:  Beginnin2021  Ending:      Progress report due (10 Rx/or 30 days whichever is less): 36    Recertification due (POC duration/ or 90 days whichever is less):      Visit # POC/Insurance Allowable Auth Needed   14 20 []Yes   []No     Latex Allergy:  [x]NO      []YES  Preferred Language for Healthcare:   [x]English       []Other:    Functional Scale:      Date assessed: at eval  Test: LEFS-59  Score:    Pain level:  2/10     History of Injury: Patient is a 66 y/o male with a hx of left knee pain with a resultant left tka on 21. He was seen for a few weeks at home. He is ambulating with a cane at this time. He c/o constant aching pain in his left knee which is worse with steps, prolonged walking, and attempted squatting. He is retired. He hopes to return to walking and stretching.         SUBJECTIVE:  Pt states, \" It seems to be doing fairly well \"  3/4/21  Pt states, \" doing pretty good today \"  21 Patient reports knee is improving,still some stiffness and swelling is about the same. 3/11/21  Pt states, \" doing well, just stiff \"  21 Patient reports knee still feels stiff.  3/23/21  Pt states, \" MD said the swelling may be part of the problem, he gave me steroids \"  21 Patient reports knee is feeling better since he started taking steroids. 0/3/30/21 Patient reports knee is doing ok,just stiffness. Denia Hardy  with dynasplint set up for extension brace. 21  Pt states, \" wearing the stretcher, not sure if it's doing much yet \" \"  21  Pt states, \"  Increasing  the tension with the stretcher, seems to be helping \"  21 Patient reports knee is doing ok,has been using his stretcher at home.   21  Pt states, \" doing pretty good \"  4/15/21  Pt states, \" still not straight , otherwise good, the stretcher at home doesn't seem to be helping \"  4/20/21    Pt states, \" \" Knee seems to be straightening a little better \"      OBJECTIVE:   Initial- flex-90,  Ext--15, strength-3+/5   03/09/21 flex 108  Ext -12   03/16/21 flex 108 ext -8   03/30/21 flex  115 ext -8     RESTRICTIONS/PRECAUTIONS:    Exercises/Interventions:     Therapeutic Ex (49073)   Min: Reps/Resistance Notes/CUES   Nu step L3 x 6 min    Leg press 100# x 40, left 45# x 40    Heel slides X 2 min , 30 x 3 stretch    clams     Prone flex X 30,  30 x 3     Ext stretch     Qs with towel roll under heel X 3min, assist to end range    saq/laq     Standing hams stretch 60 x 3    incline 60 x 3, with qs    Standing qs X 2 min       pilates press 3sp x 40, left 2 sp x 30    Prone hang 60 x 3 Added to hep 4/15/21   Pro stretch X 2 min         Manual Intervention (64662)  Min:     Knee mobs/PROM Mfr to quads, hams gastroc, itb, post tib,  Very tight in hams and gastroc as well as itb, i and 15 min, Semimembranosus and Tendinosis very tight. Tib/Fem Mobs Gr 3    Patella Mobs Gr 4     Ankle mobs               NMR re-education (70673)  Min:  CUES NEEDED   wobble 2 min ea    Step ups 4\" x 30    Step taps 4\" x 30 Told him to do this at home to help with ext 3/11/21   bosu/ski X 2 min    Semi squat     sls     Standing qs X 20    Step thru X 2 min          Pro stretch 60 x 2         Therapeutic Activity (71351)  Min:      Went over gait with cane trying to reach full ext         Modalities  Min:     IFC with      CP after exercises     MH after exercises            Other Therapeutic Activities: Pt was educated on PT POC, Diagnosis, Prognosis, pathomechanics as well as frequency and duration of scheduling future physical therapy appointments. Time was also taken on this day to answer all patient questions and participation in PT. Reviewed appointment policy in detail with patient and patient verbalized understanding.      Home Exercise Program: Patient was instructed in the following for HEP:     . Patient verbalized/demonstrated understanding and was issued written handout. Seated hamstring Stretch 30 sec x 5  Seated Calf Stretch           30 sec x 5  Seated Flexion Stretch      30 sec x 5  Seated Heel Slide  x30  Long Arc Quad      x 30  Quad Set              x 30  Prone Flexion       x 20  Straight Leg Raise   x 20  Glut Set                  x 20  Ankle Pump          x 20  Semi Squat         x 20        Therapeutic Exercise and NMR EXR  [] (56507) Provided verbal/tactile cueing for activities related to strengthening, flexibility, endurance, ROM for improvements in LE, proximal hip, and core control with self care, mobility, lifting, ambulation.  [] (48457) Provided verbal/tactile cueing for activities related to improving balance, coordination, kinesthetic sense, posture, motor skill, proprioception  to assist with LE, proximal hip, and core control in self care, mobility, lifting, ambulation and eccentric single leg control.      NMR and Therapeutic Activities:    [] (11693 or 13480) Provided verbal/tactile cueing for activities related to improving balance, coordination, kinesthetic sense, posture, motor skill, proprioception and motor activation to allow for proper function of core, proximal hip and LE with self care and ADLs and functional mobility.   [] (90079) Gait Re-education- Provided training and instruction to the patient for proper LE, core and proximal hip recruitment and positioning and eccentric body weight control with ambulation re-education including up and down stairs     Home Exercise Program:    [x] (73266) Reviewed/Progressed HEP activities related to strengthening, flexibility, endurance, ROM of core, proximal hip and LE for functional self-care, mobility, lifting and ambulation/stair navigation   [] (91087)Reviewed/Progressed HEP activities related to improving balance, coordination, kinesthetic sense, posture, motor skill, proprioception of core, proximal hip and LE for self care, mobility, lifting, and ambulation/stair navigation      Manual Treatments:  PROM / STM / Oscillations-Mobs:  G-I, II, III, IV (PA's, Inf., Post.)  [] (66444) Provided manual therapy to mobilize LE, proximal hip and/or LS spine soft tissue/joints for the purpose of modulating pain, promoting relaxation,  increasing ROM, reducing/eliminating soft tissue swelling/inflammation/restriction, improving soft tissue extensibility and allowing for proper ROM for normal function with self care, mobility, lifting and ambulation. Charges:  Timed Code Treatment Minutes: 60   Total Treatment Minutes: 60      [] EVAL (LOW) 01117 (typically 20 minutes face-to-face)  [] EVAL (MOD) 31048 (typically 30 minutes face-to-face)  [] EVAL (HIGH) 67846 (typically 45 minutes face-to-face)  [] RE-EVAL     [x] GM(64196) x2  [] Dry needle 1 or 2 Muscles (98053)  [x] NMR (94849) x 1    [] Dry needle 3+ Muscles (12879)  [x] Manual (09198) x  1  [] Ultrasound (72771) x  [] TA (22363) x     [] Mech Traction (88747)  [] ES(attended) (36300)     [] ES (un) (87922):   [] Vasopump (20789) [] Ionto (03552)    [] Other:    GOALS:  Patient stated goal: \" I want to walk and function better \"  [x]? Progressing: []? Met: []? Not Met: []? Adjusted     Therapist goals for Patient:   Short Term Goals: To be achieved in: 2 weeks  1. Independent in HEP and progression per patient tolerance, in order to prevent re-injury. []? Progressing: [x]? Met: []? Not Met: []? Adjusted  2. Patient will have a decrease in pain to facilitate improvement in movement, function, and ADLs as indicated by Functional Deficits. []? Progressing: [x]? Met: []? Not Met: []? Adjusted     Long Term Goals: To be achieved in:8 weeks  1. Disability index score of25% or less for the LEFS to assist with reaching prior level of function. []? Progressing: [x]? Met: []? Not Met: []? Adjusted  2.  Patient will demonstrate increased AROM to-5-120 to allow for proper joint functioning as indicated by patients Functional Deficits. []? Progressing: [x]? Met: []? Not Met: []? Adjusted  3. Patient will demonstrate an increase in Strength to good proximal hip strength and control5-/5[]? Progressing: []? Met: []? Not Met: []? Adjusted  4. Patient will return to 75%  functional activities without increased symptoms or restriction. []? Progressing: [x]? Met: []? Not Met: []? Adjusted  5. (patient specific functional goal)    []? Progressing: [x]? Met: []? Not Met: []? Adjusted            ASSESSMENT:  See eval    Treatment/Activity Tolerance:  [x] Patient tolerated treatment well [] Patient limited by fatique  [] Patient limited by pain  [] Patient limited by other medical complications  [] Other:     Overall Progression Towards Functional goals/ Treatment Progress Update:  [] Patient is progressing as expected towards functional goals listed. [] Progression is slowed due to complexities/Impairments listed. [] Progression has been slowed due to co-morbidities. [x] Plan just implemented, too soon to assess goals progression <30days   [] Goals require adjustment due to lack of progress  [] Patient is not progressing as expected and requires additional follow up with physician  [] Other    Prognosis for POC: [x] Good [] Fair  [] Poor    Patient requires continued skilled intervention: [x] Yes  [] No        PLAN: LE arom, prom  strength, proprioception, gait, balance, functional activities. Mfr, joint mobs, mods as needed, hep. Progress as tolerated, very tight in hams and gastroc, ;lacking extension,  Work on extension and quad control thru the range    [] Continue per plan of care [] Alter current plan (see comments)  [x] Plan of care initiated [] Hold pending MD visit [] Discharge    Electronically signed by: Kelly Lorenzo PT    Note: If patient does not return for scheduled/recommended follow up visits, this note will serve as a discharge from care along with the most recent update on progress.

## 2021-04-27 ENCOUNTER — OFFICE VISIT (OUTPATIENT)
Dept: ORTHOPEDIC SURGERY | Age: 71
End: 2021-04-27

## 2021-04-27 VITALS — WEIGHT: 231 LBS | HEIGHT: 69 IN | BODY MASS INDEX: 34.21 KG/M2

## 2021-04-27 DIAGNOSIS — Z96.652 S/P TOTAL KNEE ARTHROPLASTY, LEFT: Primary | ICD-10-CM

## 2021-04-27 DIAGNOSIS — T84.82XA ARTHROFIBROSIS OF TOTAL KNEE REPLACEMENT, INITIAL ENCOUNTER (HCC): ICD-10-CM

## 2021-04-27 PROCEDURE — 99024 POSTOP FOLLOW-UP VISIT: CPT | Performed by: ORTHOPAEDIC SURGERY

## 2021-04-27 NOTE — PROGRESS NOTES
Good Frankel  <[id number]>  April 27, 2021    Chief Complaint   Patient presents with    Follow-up     L TKA 2.8.21             History: The patient is here in follow-up regarding his left knee. He is now nearly 3 months status post left total knee arthroplasty. He continues to participate in therapy. He notes a significant improvement in his range of motion. He does not feel the Dynasplint has helped significantly. He is working hard in his rehab exercises. He reports minimal pain. He is ambulating without assistive devices. He denies any numbness or tingling. The patient's  past medical history, medications, allergies,  family history, social history, and review of systems have been reviewed, and dated and are recorded in the chart. Ht 5' 9\" (1.753 m)   Wt 231 lb (104.8 kg)   BMI 34.11 kg/m²     Physical: Mr. Good Frankel appears well, he is in no apparent distress, he demonstrates appropriate mood & affect. He is alert and oriented to person, place and time. He has mild swelling. There is No evidence of DVT seen on physical exam.. He is neurovascularly intact distally. Range of motion is from -3 degrees to 125 degrees. The incision is  clean, dry and intact and without erythema. Strength in the knee is 5/5. There is no instability with varus and valgus stressing of the knee. There is no pain with range of motion of the hips. Impression: Status post left Total Knee Arthroplasty 2. arthrofibrosis left knee      Plan:  He will continue to work aggressively on range of motion and strengthening: Natural history and expected course discussed. Questions answered. Quad strengthening exercises. The patient may use the extension brace for 1 more month. He is to work aggressively on his motion. He will follow-up with me in 3 months and we will reassess him then. At follow-up, 3 views of the left knee will be obtained.

## 2021-07-27 ENCOUNTER — OFFICE VISIT (OUTPATIENT)
Dept: ORTHOPEDIC SURGERY | Age: 71
End: 2021-07-27
Payer: MEDICARE

## 2021-07-27 VITALS — RESPIRATION RATE: 16 BRPM | BODY MASS INDEX: 34.21 KG/M2 | WEIGHT: 231 LBS | HEIGHT: 69 IN

## 2021-07-27 DIAGNOSIS — Z96.652 S/P TOTAL KNEE ARTHROPLASTY, LEFT: Primary | ICD-10-CM

## 2021-07-27 PROCEDURE — 1123F ACP DISCUSS/DSCN MKR DOCD: CPT | Performed by: ORTHOPAEDIC SURGERY

## 2021-07-27 PROCEDURE — 3017F COLORECTAL CA SCREEN DOC REV: CPT | Performed by: ORTHOPAEDIC SURGERY

## 2021-07-27 PROCEDURE — 4040F PNEUMOC VAC/ADMIN/RCVD: CPT | Performed by: ORTHOPAEDIC SURGERY

## 2021-07-27 PROCEDURE — G8427 DOCREV CUR MEDS BY ELIG CLIN: HCPCS | Performed by: ORTHOPAEDIC SURGERY

## 2021-07-27 PROCEDURE — 99213 OFFICE O/P EST LOW 20 MIN: CPT | Performed by: ORTHOPAEDIC SURGERY

## 2021-07-27 PROCEDURE — G8417 CALC BMI ABV UP PARAM F/U: HCPCS | Performed by: ORTHOPAEDIC SURGERY

## 2021-07-27 PROCEDURE — 1036F TOBACCO NON-USER: CPT | Performed by: ORTHOPAEDIC SURGERY

## 2021-07-27 RX ORDER — MELOXICAM 15 MG/1
15 TABLET ORAL DAILY PRN
Qty: 30 TABLET | Refills: 1 | Status: SHIPPED | OUTPATIENT
Start: 2021-07-27

## 2021-07-27 NOTE — PROGRESS NOTES
Charity Romberg  <[id number]>  July 27, 2021    Chief Complaint   Patient presents with    Follow-up     left total knee arthroplasty 2/8/2021             History: The patient is here in follow-up regarding his left knee. He is now nearly 5 months status post left total knee arthroplasty. He continues to participate in therapy. He was doing extremely well. He reports minimal pain. He does take Tylenol for the pain. He developed increasing swelling 3 weeks ago. He does attribute this to increasing his activity. He is an avid walker and has been walking a great deal.      The patient's  past medical history, medications, allergies,  family history, social history, and review of systems have been reviewed, and dated and are recorded in the chart. Resp 16   Ht 5' 9\" (1.753 m)   Wt 231 lb (104.8 kg)   BMI 34.11 kg/m²     Physical: Mr. Charity Romberg appears well, he is in no apparent distress, he demonstrates appropriate mood & affect. He is alert and oriented to person, place and time. He has mild swelling. There is No evidence of DVT seen on physical exam.. He is neurovascularly intact distally. Range of motion is from -3 degrees to 125 degrees. The incision is  clean, dry and intact and without erythema. Strength in the knee is 5/5. There is no instability with varus and valgus stressing of the knee. There is no pain with range of motion of the hips. X-rays: 3 views of the left knee obtained in the office today were extensively reviewed. The prosthesis is well aligned. There is no evidence of fracture or dislocation. Impression: Status post left Total Knee Arthroplasty       Plan:  He will continue to work aggressively on range of motion and strengthening: Natural history and expected course discussed. Questions answered. Quad strengthening exercises. At this time, the patient will continue to modify his activities. He will continue to work on range of motion.   He will follow-up with me in approximately 7 months and we will reassess him then. At follow-up, 3 views of the left knee will be obtained.   He was given a prescription for meloxicam.

## 2021-12-08 RX ORDER — MELOXICAM 15 MG/1
15 TABLET ORAL DAILY PRN
Qty: 90 TABLET | Refills: 1 | Status: SHIPPED | OUTPATIENT
Start: 2021-12-08 | End: 2022-07-19

## 2022-02-08 ENCOUNTER — OFFICE VISIT (OUTPATIENT)
Dept: ORTHOPEDIC SURGERY | Age: 72
End: 2022-02-08
Payer: MEDICARE

## 2022-02-08 VITALS — HEIGHT: 69 IN | BODY MASS INDEX: 33.47 KG/M2 | WEIGHT: 226 LBS

## 2022-02-08 DIAGNOSIS — Z96.652 S/P TOTAL KNEE ARTHROPLASTY, LEFT: Primary | ICD-10-CM

## 2022-02-08 PROCEDURE — G8417 CALC BMI ABV UP PARAM F/U: HCPCS | Performed by: ORTHOPAEDIC SURGERY

## 2022-02-08 PROCEDURE — 99213 OFFICE O/P EST LOW 20 MIN: CPT | Performed by: ORTHOPAEDIC SURGERY

## 2022-02-08 PROCEDURE — G8484 FLU IMMUNIZE NO ADMIN: HCPCS | Performed by: ORTHOPAEDIC SURGERY

## 2022-02-08 PROCEDURE — 1036F TOBACCO NON-USER: CPT | Performed by: ORTHOPAEDIC SURGERY

## 2022-02-08 PROCEDURE — G8428 CUR MEDS NOT DOCUMENT: HCPCS | Performed by: ORTHOPAEDIC SURGERY

## 2022-02-08 PROCEDURE — 3017F COLORECTAL CA SCREEN DOC REV: CPT | Performed by: ORTHOPAEDIC SURGERY

## 2022-02-08 PROCEDURE — 1123F ACP DISCUSS/DSCN MKR DOCD: CPT | Performed by: ORTHOPAEDIC SURGERY

## 2022-02-08 PROCEDURE — 4040F PNEUMOC VAC/ADMIN/RCVD: CPT | Performed by: ORTHOPAEDIC SURGERY

## 2022-02-08 NOTE — PROGRESS NOTES
Gavino Lucas  <[id number]>  February 8, 2022    Chief Complaint   Patient presents with    Follow-up     2/8/21 L TKA             History: The patient is here in follow-up regarding his left knee. He is now 1 year status post left total knee arthroplasty. He reports no pain with most activities. He occasionally has mild pain with aggressive activities. He denies any numbness or tingling. He has resumed most daily activities. The patient's  past medical history, medications, allergies,  family history, social history, and review of systems have been reviewed, and dated and are recorded in the chart. Ht 5' 9\" (1.753 m)   Wt 226 lb (102.5 kg)   BMI 33.37 kg/m²     Physical: Mr. Gavino Lucas appears well, he is in no apparent distress, he demonstrates appropriate mood & affect. He is alert and oriented to person, place and time. He has no swelling. There is No evidence of DVT seen on physical exam. He is neurovascularly intact distally. Range of motion is from 0 degrees to 125 degrees. The incision is  clean, dry and intact and without erythema. Strength in the knee is 5/5. There is no instability with varus and valgus stressing of the knee. There is no pain with range of motion of the hips. Xrays: Three views of the left knee were obtained and reviewed. The prosthesis is well aligned and there is no evidence of loosening. Impression: Status post left Total Knee Arthroplasty,Doing well postoperatively. Plan:  He will continue to work aggressively on range of motion and strengthening: Natural history and expected course discussed. Questions answered. Quad strengthening exercises. The patient will continue with regular activities. He was instructed to call the office with any concerns regarding pain, swelling or erythema. He will follow-up with me in 1 year and we will reassess him then. At follow-up, 3 views of the left knee will be obtained.

## 2022-07-19 RX ORDER — MELOXICAM 15 MG/1
15 TABLET ORAL DAILY PRN
Qty: 90 TABLET | Refills: 3 | Status: SHIPPED | OUTPATIENT
Start: 2022-07-19

## 2023-04-28 ENCOUNTER — HOSPITAL ENCOUNTER (OUTPATIENT)
Dept: GENERAL RADIOLOGY | Age: 73
Discharge: HOME | End: 2023-04-28
Payer: MEDICARE

## 2023-04-28 ENCOUNTER — HOSPITAL ENCOUNTER (OUTPATIENT)
Age: 73
Discharge: HOME | End: 2023-04-28
Payer: MEDICARE

## 2023-04-28 DIAGNOSIS — R22.33: ICD-10-CM

## 2023-04-28 PROCEDURE — 73140 X-RAY EXAM OF FINGER(S): CPT

## 2023-06-19 RX ORDER — MELOXICAM 15 MG/1
15 TABLET ORAL DAILY PRN
Qty: 90 TABLET | Refills: 3 | OUTPATIENT
Start: 2023-06-19

## 2023-06-20 ENCOUNTER — HOSPITAL ENCOUNTER (OUTPATIENT)
Dept: GENERAL RADIOLOGY | Age: 73
Discharge: HOME | End: 2023-06-20
Payer: MEDICARE

## 2023-06-20 ENCOUNTER — TRANSCRIBE ORDERS (OUTPATIENT)
Dept: GENERAL RADIOLOGY | Age: 73
End: 2023-06-20

## 2023-06-20 ENCOUNTER — HOSPITAL ENCOUNTER (OUTPATIENT)
Age: 73
Discharge: HOME | End: 2023-06-20
Payer: MEDICARE

## 2023-06-20 DIAGNOSIS — R52 PAIN: ICD-10-CM

## 2023-06-20 PROCEDURE — 73630 X-RAY EXAM OF FOOT: CPT

## 2023-11-21 NOTE — PLAN OF CARE
elements found upon examination of body systems using standardized tests and measures addressing any of the following: body structures, functions (impairments), activity limitations, and/or participation restrictions  [x] A clinical presentation with evolving clinical presentation with changing characteristics  [x] Clinical decision making of LOW (68101 - Typically 20 minutes face-to-face) complexity using standardized patient assessment instrument and/or measurable assessment of functional outcome. GOALS     Patient stated goal: \" I want to have less pain   Status: [] Progressing: [] Met: [] Not Met: [] Adjusted    Therapist goals for Patient:   Short Term Goals: To be achieved in: 2 weeks  Independent in HEP and progression per patient tolerance, in order to progress toward full function and prevent re-injury. Status: [] Progressing: [] Met: [] Not Met: [] Adjusted  Patient will have a decrease in pain to 4/10 to help  facilitate improvement in movement, function, and ADLs as indicated by functional deficits. Status: [] Progressing: [] Met: [] Not Met: [] Adjusted    Long Term Goals: To be achieved in: 10 weeks  Disability index score of 20 or less for the LEFS to assist with return top prior level of function. Status: [] Progressing: [] Met: [] Not Met: [] Adjusted  Improve ROM to WNL to allow for proper joint functioning as indicated by patients functional deficits. Status: [] Progressing: [] Met: [] Not Met: [] Adjusted  Pt to improve strength to 4+/5 or better of proximal hip, multifidus, posterior chain LE, posterior chain UE/postural muscles, and quadricepsto allow for proper muscle and joint use in functional mobility, ADLs and prior level of function  Status: [] Progressing: [] Met: [] Not Met: [] Adjusted  Patient will return to Usual work, housework or activities without increased symptoms or restriction to work towards return to prior level of function.   Status: [] Progressing: [] Met: []

## 2023-11-28 ENCOUNTER — HOSPITAL ENCOUNTER (OUTPATIENT)
Dept: PHYSICAL THERAPY | Age: 73
Setting detail: THERAPIES SERIES
Discharge: HOME | End: 2023-11-28
Payer: MEDICARE

## 2023-11-28 DIAGNOSIS — R26.2 DIFFICULTY WALKING: Primary | ICD-10-CM

## 2023-11-28 DIAGNOSIS — R53.1 FUNCTIONAL WEAKNESS: ICD-10-CM

## 2023-11-28 DIAGNOSIS — R68.89 DECREASED ABILITY TO PERFORM ACTIVITIES: ICD-10-CM

## 2023-11-28 DIAGNOSIS — R26.89 DECREASED FUNCTIONAL MOBILITY: ICD-10-CM

## 2023-11-28 DIAGNOSIS — R68.89 DECREASED FUNCTIONAL ACTIVITY TOLERANCE: ICD-10-CM

## 2023-11-28 PROCEDURE — 97110 THERAPEUTIC EXERCISES: CPT

## 2023-11-28 PROCEDURE — 97530 THERAPEUTIC ACTIVITIES: CPT

## 2023-11-28 PROCEDURE — 97161 PT EVAL LOW COMPLEX 20 MIN: CPT

## 2023-11-28 NOTE — FLOWSHEET NOTE
[id number] [facility], [address] office: [phone] fax: [phone]      Physical Therapy: TREATMENT/PROGRESS NOTE   Patient: Gregory Rogers (19 y.o. male)   Treatment Date: 2023   :  1950 MRN: [id number]   Visit #: 1   Insurance Allowable Auth Needed   mn []Yes    []No    Insurance: Payor: Dav Valles / Plan: [address] [place] [address] / Product Type: *No Product type* /   Insurance ID: [id number] - (Medicare Managed)  Secondary Insurance (if applicable):    Treatment Diagnosis:     ICD-10-CM    1. Difficulty walking  R26.2       2. Decreased functional mobility  R26.89       3. Decreased functional activity tolerance  R68.89       4. Functional weakness  R53.1       5. 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]   Referring Physician: Med Puri MD  PCP: Med Puri MD                             Plan of care signed (Y/N):     Date of Patient follow up with Physician:      Progress Report/POC: EVAL today  POC update due: (10 visits [address], whichever is less)  2023      (Cut and paste Precautions/Contraindications from [facility])    Preferred Language for Healthcare:   [x]English       []other:    SUBJECTIVE EXAMINATION     Patient Report/Comments: see eval     Test used Initial score  23   Pain Summary VAS 4/10    Functional questionnaire LEFS 40    Other:                OBJECTIVE EXAMINATION     Observation:        SUBJECTIVE EXAMINATION      Patient stated complaint: Pt is a 75 y/o male with a hx of lb and bilateral hams pain to his knees. He wakes up stiff and has a hard time getting up and straightening his legs. He also has n+t in his bilat feet off and on with the left being worse. He denies saddle anesthesia or bowel and bladder problems.   He gets releief from

## 2023-11-30 ENCOUNTER — APPOINTMENT (OUTPATIENT)
Dept: PHYSICAL THERAPY | Age: 73
End: 2023-11-30
Payer: MEDICARE

## 2023-12-05 ENCOUNTER — HOSPITAL ENCOUNTER (OUTPATIENT)
Dept: PHYSICAL THERAPY | Age: 73
Setting detail: THERAPIES SERIES
Discharge: HOME | End: 2023-12-05
Payer: MEDICARE

## 2023-12-05 PROCEDURE — 97110 THERAPEUTIC EXERCISES: CPT

## 2023-12-05 PROCEDURE — 97140 MANUAL THERAPY 1/> REGIONS: CPT

## 2023-12-05 NOTE — FLOWSHEET NOTE
[address] [facility], [address] office: [phone] fax: [phone]      Physical Therapy: TREATMENT/PROGRESS NOTE   Patient: Briana Mcconnell (47 y.o. 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]   Referring Physician: Tawanda Eason MD  PCP: Tawanda Eason MD                             Plan of care signed (Y/N):     Date of Patient follow up with Physician:      Progress Report/POC: EVAL today  POC update due: (10 visits /OR [address], whichever is less)  2024      (Cut and paste Precautions/Contraindications from [facility])    Preferred Language for Healthcare:   [x]English       []other:    SUBJECTIVE EXAMINATION     Patient Report/Comments: see eval     Test used Initial score  23   Pain Summary VAS 4/10    Functional questionnaire LEFS 40    Other:                OBJECTIVE EXAMINATION     Observation:        SUBJECTIVE EXAMINATION      Patient stated complaint: Pt is a 75 y/o male with a hx of lb and bilateral hams pain to his knees. He wakes up stiff and has a hard time getting up and straightening his legs. He also has n+t in his bilat feet off and on with the left being worse. He denies saddle anesthesia or bowel and bladder problems. He gets releief from stretching. He also says his left leg feels weak at times. He hopes to have a decrease in pain and increase in function. He has a left tka.    23  Pt states, \" I feel great, No pain and the numbness is gone

## 2023-12-07 ENCOUNTER — APPOINTMENT (OUTPATIENT)
Dept: PHYSICAL THERAPY | Age: 73
End: 2023-12-07
Payer: MEDICARE

## 2023-12-12 ENCOUNTER — APPOINTMENT (OUTPATIENT)
Dept: PHYSICAL THERAPY | Age: 73
End: 2023-12-12
Payer: MEDICARE

## 2024-03-23 NOTE — CARE COORDINATION
[facility] aware of discharge. Patient will be seen 2/10/21. Orders faxed to [facility]. Electronically signed by Iker Torrez LPN on 7/9/26 at 6:26 AM EST Yes

## 2024-09-19 ENCOUNTER — HOSPITAL ENCOUNTER (OUTPATIENT)
Dept: GENERAL RADIOLOGY | Age: 74
Discharge: HOME | End: 2024-09-19
Payer: MEDICARE

## 2024-09-19 ENCOUNTER — HOSPITAL ENCOUNTER (OUTPATIENT)
Dept: MRI IMAGING | Age: 74
Discharge: HOME | End: 2024-09-19
Payer: MEDICARE

## 2024-09-19 ENCOUNTER — TRANSCRIBE ORDERS (OUTPATIENT)
Dept: GENERAL RADIOLOGY | Age: 74
End: 2024-09-19

## 2024-09-19 DIAGNOSIS — G83.10 MONOPLEGIA OF LOWER EXTREMITY, UNSPECIFIED ETIOLOGY, UNSPECIFIED LATERALITY (HCC): ICD-10-CM

## 2024-09-19 DIAGNOSIS — M54.16 LUMBAR RADICULOPATHY: ICD-10-CM

## 2024-09-19 PROCEDURE — 72110 X-RAY EXAM L-2 SPINE 4/>VWS: CPT

## 2024-09-19 PROCEDURE — 72148 MRI LUMBAR SPINE W/O DYE: CPT

## 2024-10-08 ENCOUNTER — HOSPITAL ENCOUNTER (OUTPATIENT)
Dept: CT IMAGING | Age: 74
Discharge: HOME | End: 2024-10-08
Payer: MEDICARE

## 2024-10-08 DIAGNOSIS — M47.26 OTHER SPONDYLOSIS WITH RADICULOPATHY, LUMBAR REGION: ICD-10-CM

## 2024-10-08 PROCEDURE — 72131 CT LUMBAR SPINE W/O DYE: CPT

## 2024-12-31 ENCOUNTER — TRANSCRIBE ORDERS (OUTPATIENT)
Dept: ADMINISTRATIVE | Age: 74
End: 2024-12-31

## 2024-12-31 DIAGNOSIS — R94.31 ABNORMAL ELECTROCARDIOGRAM: ICD-10-CM

## 2024-12-31 DIAGNOSIS — I45.0 RIGHT FASCICULAR BLOCK: Primary | ICD-10-CM

## 2025-01-06 ENCOUNTER — HOSPITAL ENCOUNTER (OUTPATIENT)
Age: 75
Discharge: HOME | End: 2025-01-06

## 2025-01-06 DIAGNOSIS — Z01.810 PRE-OPERATIVE CARDIOVASCULAR EXAMINATION: ICD-10-CM

## 2025-01-06 DIAGNOSIS — R94.31 ABNORMAL ELECTROCARDIOGRAPHY: Primary | ICD-10-CM

## 2025-01-07 ENCOUNTER — HOSPITAL ENCOUNTER (OUTPATIENT)
Age: 75
End: 2025-01-07
Payer: MEDICARE

## 2025-01-07 ENCOUNTER — HOSPITAL ENCOUNTER (OUTPATIENT)
Dept: NUCLEAR MEDICINE | Age: 75
End: 2025-01-07
Payer: MEDICARE

## 2025-01-07 ENCOUNTER — HOSPITAL ENCOUNTER (OUTPATIENT)
Age: 75
Discharge: HOME | End: 2025-01-09
Payer: MEDICARE

## 2025-01-07 ENCOUNTER — HOSPITAL ENCOUNTER (OUTPATIENT)
Dept: NUCLEAR MEDICINE | Age: 75
Discharge: HOME | End: 2025-01-07
Payer: MEDICARE

## 2025-01-07 DIAGNOSIS — Z01.810 PRE-OPERATIVE CARDIOVASCULAR EXAMINATION: ICD-10-CM

## 2025-01-07 DIAGNOSIS — Z01.810 PRE-PROCEDURAL CARDIOVASCULAR EXAMINATION: ICD-10-CM

## 2025-01-07 DIAGNOSIS — R94.31 ABNORMAL ELECTROCARDIOGRAPHY: ICD-10-CM

## 2025-01-07 DIAGNOSIS — R94.31 ABNORMAL ECG: ICD-10-CM

## 2025-01-07 PROCEDURE — 3430000000 HC RX DIAGNOSTIC RADIOPHARMACEUTICAL: Performed by: FAMILY MEDICINE

## 2025-01-07 PROCEDURE — 93017 CV STRESS TEST TRACING ONLY: CPT

## 2025-01-07 PROCEDURE — A9502 TC99M TETROFOSMIN: HCPCS | Performed by: FAMILY MEDICINE

## 2025-01-07 PROCEDURE — 6360000002 HC RX W HCPCS: Performed by: FAMILY MEDICINE

## 2025-01-07 PROCEDURE — 78452 HT MUSCLE IMAGE SPECT MULT: CPT

## 2025-01-07 RX ORDER — REGADENOSON 0.08 MG/ML
0.4 INJECTION, SOLUTION INTRAVENOUS
Status: DISCONTINUED | OUTPATIENT
Start: 2025-01-07 | End: 2025-01-07

## 2025-01-07 RX ADMIN — TETROFOSMIN 11.8 MILLICURIE: 1.38 INJECTION, POWDER, LYOPHILIZED, FOR SOLUTION INTRAVENOUS at 13:35

## 2025-01-07 RX ADMIN — TETROFOSMIN 34.5 MILLICURIE: 1.38 INJECTION, POWDER, LYOPHILIZED, FOR SOLUTION INTRAVENOUS at 14:30

## 2025-01-07 RX ADMIN — REGADENOSON 0.4 MG: 0.08 INJECTION, SOLUTION INTRAVENOUS at 14:30

## 2025-01-08 LAB
NUC STRESS EJECTION FRACTION: 81 %
NUC STRESS LV EDV: 78 ML (ref 67–155)
NUC STRESS LV ESV: 15 ML (ref 22–58)
NUC STRESS LV MASS: 125 G
NUC STRESS LV STROKE VOLUME: 63 ML
STRESS BASELINE DIAS BP: 78 MMHG
STRESS BASELINE HR: 82 BPM
STRESS BASELINE SYS BP: 124 MMHG
STRESS ESTIMATED WORKLOAD: 1 METS
STRESS EXERCISE DUR MIN: 1 MIN
STRESS PEAK DIAS BP: 87 MMHG
STRESS PEAK SYS BP: 171 MMHG
STRESS PERCENT HR ACHIEVED: 74 %
STRESS POST PEAK HR: 107 BPM
STRESS RATE PRESSURE PRODUCT: ABNORMAL BPM*MMHG
STRESS TARGET HR: 144 BPM
TID: 0.9

## 2025-01-08 PROCEDURE — 93016 CV STRESS TEST SUPVJ ONLY: CPT | Performed by: INTERNAL MEDICINE

## 2025-01-08 PROCEDURE — 93018 CV STRESS TEST I&R ONLY: CPT | Performed by: INTERNAL MEDICINE

## 2025-01-08 PROCEDURE — 78452 HT MUSCLE IMAGE SPECT MULT: CPT | Performed by: INTERNAL MEDICINE

## 2025-01-09 ENCOUNTER — TELEPHONE (OUTPATIENT)
Dept: CARDIOLOGY | Age: 75
End: 2025-01-09

## 2025-01-09 NOTE — TELEPHONE ENCOUNTER
Received call from PCP regarding interpretation of stress testing.    Stress test shows small area of possible ischemia in the lateral wall.  Sum difference of score is only 3.    If patient is not having any active chest pain, patient can proceed with surgery.  Overall cardiac risk would be acceptable.    Would recommend secondary prevention measures including lipid management.    Recommend cardiology follow-up in case patient develops any new cardiac symptoms.    Recommend Toprol-XL 12.5 mg daily    In the absence of significant anginal pains, and a low risk positive stress test, invasive coronary angiogram is currently not indicated.

## 2025-02-04 ENCOUNTER — HOSPITAL ENCOUNTER (OUTPATIENT)
Dept: GENERAL RADIOLOGY | Age: 75
Discharge: HOME | End: 2025-02-04
Payer: MEDICARE

## 2025-02-04 ENCOUNTER — HOSPITAL ENCOUNTER (OUTPATIENT)
Age: 75
Discharge: HOME | End: 2025-02-04
Payer: MEDICARE

## 2025-02-04 PROCEDURE — 72100 X-RAY EXAM L-S SPINE 2/3 VWS: CPT

## (undated) DEVICE — NEEDLE SPNL 22GA L3.5IN BLK HUB S STL REG WALL FIT STYL W/

## (undated) DEVICE — SYRINGE IRRIG 60ML SFT PLIABLE BLB EZ TO GRP 1 HND USE W/

## (undated) DEVICE — PAD,NON-ADHERENT,3X8,STERILE,LF,1/PK: Brand: MEDLINE

## (undated) DEVICE — NEEDLE HYPO 22GA L1 1/2IN PIVOTING SHLD FOR LUERLOCK SYR

## (undated) DEVICE — Z INACTIVE USE 2660664 SOLUTION IRRIG 3000ML 0.9% SOD CHL USP UROMATIC PLAS CONT

## (undated) DEVICE — ZIMMER® STERILE DISPOSABLE TOURNIQUET CUFF WITH PLC, DUAL PORT, SINGLE BLADDER, 34 IN. (86 CM)

## (undated) DEVICE — SPONGE GZ W4XL4IN COT 12 PLY TYP VII WVN C FLD DSGN

## (undated) DEVICE — SUTURE VCRL SZ 1 L18IN ABSRB UD L36MM CT-1 1/2 CIR J841D

## (undated) DEVICE — SUTURE VCRL SZ 1 L27IN ABSRB UD CT-1 L36MM 1/2 CIR J261H

## (undated) DEVICE — PIN FIX L150MM DIA3.2MM FLUT CAS

## (undated) DEVICE — GLOVE ORANGE PI 8 1/2   MSG9085

## (undated) DEVICE — SOLUTION IV IRRIG POUR BRL 0.9% SODIUM CHL 2F7124

## (undated) DEVICE — 3M™ STERI-DRAPE™ U-DRAPE 1015: Brand: STERI-DRAPE™

## (undated) DEVICE — KNEE HOLDER DISPOSABLE LINER: Brand: ALVARADO®  KNEE SUPPORT

## (undated) DEVICE — SHEET,DRAPE,53X77,STERILE: Brand: MEDLINE

## (undated) DEVICE — DRAPE HND W114XL142IN BLU POLYPR W O PCH FEN CRD AND TB HLDR

## (undated) DEVICE — FORCEPS BX 240CM 2.4MM L NDL RAD JAW 4 M00513334

## (undated) DEVICE — DRESSING PETRO W3XL3IN OIL EMUL N ADH GZ KNIT IMPREG CELOS

## (undated) DEVICE — ANTI-EMBOLISM STOCKINGS,THIGH LENGTH,LARGE-LONG-SIZE J: Brand: T.E.D.

## (undated) DEVICE — COVER LT HNDL BLU PLAS

## (undated) DEVICE — SUTURE NONABSORBABLE MONOFILAMENT 4-0 FS-2 18 IN ETHILON 662H

## (undated) DEVICE — COTTON UNDERCAST PADDING,CRIMPED FINISH: Brand: WEBRIL

## (undated) DEVICE — BLADE RMR L46MM PAT PILOT H

## (undated) DEVICE — 4-PORT MANIFOLD: Brand: NEPTUNE 2

## (undated) DEVICE — GLOVE SURG SZ 65 L12IN FNGR THK87MIL WHT LTX FREE

## (undated) DEVICE — SCREW BNE L48MM CONSTRN CNDYL KNEE HEX HD STEM FOR LEG NXGN
Type: IMPLANTABLE DEVICE | Site: KNEE | Status: NON-FUNCTIONAL
Removed: 2021-02-08

## (undated) DEVICE — GLOVE SURG SZ 8 L12IN FNGR THK94MIL STD WHT LTX SYN POLYMER

## (undated) DEVICE — DECANTER BAG 9": Brand: MEDLINE INDUSTRIES, INC.

## (undated) DEVICE — BONE SCREW 6.5X25 SELF-TAP
Type: IMPLANTABLE DEVICE | Site: KNEE | Status: NON-FUNCTIONAL
Removed: 2021-02-08

## (undated) DEVICE — MINOR SET UP PK

## (undated) DEVICE — ZIMMER® STERILE DISPOSABLE TOURNIQUET CUFF WITH PLC, DUAL PORT, SINGLE BLADDER, 18 IN. (46 CM)

## (undated) DEVICE — BIT DRL L205MM DIA2.7MM STD QUIK CONN W/O STP N RADLUC

## (undated) DEVICE — DRAPE ROSA ROBOTIC UNIT

## (undated) DEVICE — DUAL CUT SAGITTAL BLADE

## (undated) DEVICE — HYPODERMIC SAFETY NEEDLE: Brand: MAGELLAN

## (undated) DEVICE — Z DISCONTINUED USE 2275676 GLOVE SURG SZ 65 L12IN FNGR THK87MIL DK GRN LTX FREE ISOLEX

## (undated) DEVICE — STERILE TOTAL KNEE DRAPE PACK: Brand: CARDINAL HEALTH

## (undated) DEVICE — PAD,ABDOMINAL,8"X7.5",STERILE,LF,1/PK: Brand: MEDLINE

## (undated) DEVICE — GOWN SIRUS NONREIN XL W/TWL: Brand: MEDLINE INDUSTRIES, INC.

## (undated) DEVICE — SOLUTION IV IRRIG 250ML ST LF 0.9% SODIUM 2F7122

## (undated) DEVICE — Z DISCONTINUED USE 2716304 SUTURE STRATAFIX SPRL SZ 3-0 L12IN ABSRB UD FS-1 L24MM 3/8

## (undated) DEVICE — GLOVE SURG SZ 85 L12IN FNGR THK94MIL STD WHT LTX FREE

## (undated) DEVICE — SYSTEM SKIN CLSR 22CM DERMBND PRINEO

## (undated) DEVICE — Device: Brand: POWER-FLO®

## (undated) DEVICE — MATTRESS MAXI AIR TRNSF SGL PT USE DCS 37 45 48 52 75

## (undated) DEVICE — SPONGE LAP W18XL18IN WHT COT 4 PLY FLD STRUNG RADPQ DISP ST

## (undated) DEVICE — PADDING UNDERCAST W4INXL4YD 100% COT CRIMPED FINISH WBRL II

## (undated) DEVICE — 450 ML BOTTLE OF 0.05% CHLORHEXIDINE GLUCONATE IN 99.95% STERILE WATER FOR IRRIGATION, USP AND APPLICATOR.: Brand: IRRISEPT ANTIMICROBIAL WOUND LAVAGE

## (undated) DEVICE — BANDAGE COMPR W6INXL10YD ST M E WHITE/BEIGE

## (undated) DEVICE — DRAPE ROSA MONITOR

## (undated) DEVICE — GARMENT COMPR STD FOR 17IN CALF UNIF THER FLOTRN

## (undated) DEVICE — UNDERGLOVE SURG SZ 8 FNGR THK0.21MIL GRN LTX BEAD CUF

## (undated) DEVICE — BANDAGE COMPR W4INXL12FT E DISP ESMARCH EVEN

## (undated) DEVICE — BANDAGE COMPR W6INXL12FT SMOOTH FOR LIMB EXSANG ESMARCH

## (undated) DEVICE — SUTURE VCRL SZ 2-0 L18IN ABSRB UD CT-1 L36MM 1/2 CIR J839D

## (undated) DEVICE — CHLORAPREP 26ML ORANGE

## (undated) DEVICE — BANDAGE COMPR W4INXL15FT BGE E SGL LAYERED CLP CLSR

## (undated) DEVICE — TOTAL KNEE: Brand: MEDLINE INDUSTRIES, INC.

## (undated) DEVICE — GOWN,SIRUS,POLYRNF,BRTHSLV,XL,30/CS: Brand: MEDLINE

## (undated) DEVICE — DRAPE,REIN 53X77,STERILE: Brand: MEDLINE